# Patient Record
Sex: FEMALE | Race: WHITE | NOT HISPANIC OR LATINO | Employment: FULL TIME | URBAN - METROPOLITAN AREA
[De-identification: names, ages, dates, MRNs, and addresses within clinical notes are randomized per-mention and may not be internally consistent; named-entity substitution may affect disease eponyms.]

---

## 2017-01-09 ENCOUNTER — HISTORICAL (OUTPATIENT)
Dept: LAB | Facility: HOSPITAL | Age: 47
End: 2017-01-09

## 2017-02-06 ENCOUNTER — HISTORICAL (OUTPATIENT)
Dept: LAB | Facility: HOSPITAL | Age: 47
End: 2017-02-06

## 2017-03-01 ENCOUNTER — HISTORICAL (OUTPATIENT)
Dept: LAB | Facility: HOSPITAL | Age: 47
End: 2017-03-01

## 2017-03-03 ENCOUNTER — HISTORICAL (OUTPATIENT)
Dept: CARDIOLOGY | Facility: HOSPITAL | Age: 47
End: 2017-03-03

## 2017-04-05 ENCOUNTER — HISTORICAL (OUTPATIENT)
Dept: LAB | Facility: HOSPITAL | Age: 47
End: 2017-04-05

## 2017-06-05 ENCOUNTER — HISTORICAL (OUTPATIENT)
Dept: LAB | Facility: HOSPITAL | Age: 47
End: 2017-06-05

## 2017-06-05 LAB
INR PPP: 2.4
PROTHROMBIN TIME: 24.7 SECOND(S) (ref 9.8–12)

## 2017-06-30 ENCOUNTER — HISTORICAL (OUTPATIENT)
Dept: LAB | Facility: HOSPITAL | Age: 47
End: 2017-06-30

## 2017-06-30 LAB
INR PPP: 2.8
PROTHROMBIN TIME: 29.3 SECOND(S) (ref 9.8–12)

## 2017-07-20 ENCOUNTER — HISTORICAL (OUTPATIENT)
Dept: LAB | Facility: HOSPITAL | Age: 47
End: 2017-07-20

## 2017-07-20 ENCOUNTER — HISTORICAL (OUTPATIENT)
Dept: RADIOLOGY | Facility: HOSPITAL | Age: 47
End: 2017-07-20

## 2017-07-20 LAB
APPEARANCE, UA: ABNORMAL
BACTERIA SPEC CULT: ABNORMAL /HPF
BILIRUB UR QL STRIP: ABNORMAL
COLOR UR: YELLOW
GLUCOSE (UA): NEGATIVE
HEMOCCULT SP1 STL QL: NEGATIVE
HGB UR QL STRIP: NEGATIVE
KETONES UR QL STRIP: NEGATIVE
LEUKOCYTE ESTERASE UR QL STRIP: NEGATIVE
NITRITE UR QL STRIP: NEGATIVE
PH UR STRIP: 6.5 [PH] (ref 5–9)
PROT UR QL STRIP: NEGATIVE
RBC #/AREA URNS HPF: ABNORMAL /[HPF]
SP GR UR STRIP: 1.02 (ref 1–1.03)
SQUAMOUS EPITHELIAL, UA: 10 /HPF (ref 0–4)
UROBILINOGEN UR STRIP-ACNC: 1
WBC #/AREA URNS HPF: ABNORMAL /[HPF]

## 2017-08-02 ENCOUNTER — HISTORICAL (OUTPATIENT)
Dept: LAB | Facility: HOSPITAL | Age: 47
End: 2017-08-02

## 2017-08-02 LAB
INR PPP: 2.2
PROTHROMBIN TIME: 22.7 SECOND(S) (ref 9.8–12)

## 2017-08-30 ENCOUNTER — HISTORICAL (OUTPATIENT)
Dept: LAB | Facility: HOSPITAL | Age: 47
End: 2017-08-30

## 2017-08-30 LAB
INR PPP: 3
PROTHROMBIN TIME: 31.7 SECOND(S) (ref 9.8–12)

## 2017-09-29 ENCOUNTER — HISTORICAL (OUTPATIENT)
Dept: LAB | Facility: HOSPITAL | Age: 47
End: 2017-09-29

## 2017-09-29 LAB
INR PPP: 4.91 (ref 2–3)
PROTHROMBIN TIME: 53.5 SECOND(S) (ref 9.3–11.4)

## 2017-10-30 ENCOUNTER — HISTORICAL (OUTPATIENT)
Dept: LAB | Facility: HOSPITAL | Age: 47
End: 2017-10-30

## 2017-10-30 LAB
INR PPP: 5.64 (ref 2–3)
PROTHROMBIN TIME: 61.8 SECOND(S) (ref 9.3–11.4)

## 2017-11-02 ENCOUNTER — HISTORICAL (OUTPATIENT)
Dept: LAB | Facility: HOSPITAL | Age: 47
End: 2017-11-02

## 2017-11-02 LAB
INR PPP: 2.17 (ref 2–3)
PROTHROMBIN TIME: 22.8 SECOND(S) (ref 9.3–11.4)

## 2017-11-20 ENCOUNTER — HISTORICAL (OUTPATIENT)
Dept: LAB | Facility: HOSPITAL | Age: 47
End: 2017-11-20

## 2017-11-20 LAB
INR PPP: 1.7
PROTHROMBIN TIME: 17.1 SECOND(S) (ref 9.8–12)

## 2017-12-06 ENCOUNTER — HISTORICAL (OUTPATIENT)
Dept: LAB | Facility: HOSPITAL | Age: 47
End: 2017-12-06

## 2017-12-06 LAB
INR PPP: 4.4 (ref 2–3)
PROTHROMBIN TIME: 47.7 SECOND(S) (ref 9.3–11.4)

## 2017-12-16 LAB — RAPID GROUP A STREP (OHS): NEGATIVE

## 2017-12-26 LAB — RAPID GROUP A STREP (OHS): NEGATIVE

## 2018-01-02 ENCOUNTER — HISTORICAL (OUTPATIENT)
Dept: LAB | Facility: HOSPITAL | Age: 48
End: 2018-01-02

## 2018-01-02 LAB
ALBUMIN SERPL-MCNC: 3.5 GM/DL (ref 3.4–5)
ALBUMIN/GLOB SERPL: 1 {RATIO}
ALP SERPL-CCNC: 134 UNIT/L (ref 45–117)
ALT SERPL-CCNC: 19 UNIT/L (ref 13–56)
AST SERPL-CCNC: 25 UNIT/L (ref 15–37)
BILIRUB SERPL-MCNC: 0.3 MG/DL (ref 0.2–1)
BILIRUBIN DIRECT+TOT PNL SERPL-MCNC: 0.1 MG/DL (ref 0–0.2)
BILIRUBIN DIRECT+TOT PNL SERPL-MCNC: 0.2 MG/DL (ref 0–1)
BUN SERPL-MCNC: 9 MG/DL (ref 7–18)
CALCIUM SERPL-MCNC: 8.7 MG/DL (ref 8.5–10.1)
CHLORIDE SERPL-SCNC: 108 MMOL/L (ref 98–107)
CO2 SERPL-SCNC: 25 MMOL/L (ref 21–32)
CREAT SERPL-MCNC: 0.7 MG/DL (ref 0.55–1.02)
GLOBULIN SER-MCNC: 4 GM/DL (ref 2–4)
GLUCOSE SERPL-MCNC: 65 MG/DL (ref 74–106)
INR PPP: 4.4 (ref 2–3)
POTASSIUM SERPL-SCNC: 3.5 MMOL/L (ref 3.5–5.1)
PROT SERPL-MCNC: 7.5 GM/DL (ref 6.4–8.2)
PROTHROMBIN TIME: 44.9 SECOND(S) (ref 9.3–11.4)
SODIUM SERPL-SCNC: 142 MMOL/L (ref 136–145)

## 2018-01-15 ENCOUNTER — HISTORICAL (OUTPATIENT)
Dept: LAB | Facility: HOSPITAL | Age: 48
End: 2018-01-15

## 2018-01-15 LAB
INR PPP: 1.8 (ref 2–3)
PROTHROMBIN TIME: 18.3 SECOND(S) (ref 9.3–11.4)

## 2018-01-28 LAB
INFLUENZA A ANTIGEN, POC: POSITIVE
INFLUENZA B ANTIGEN, POC: NEGATIVE

## 2018-01-30 ENCOUNTER — HISTORICAL (OUTPATIENT)
Dept: LAB | Facility: HOSPITAL | Age: 48
End: 2018-01-30

## 2018-01-30 LAB
INR PPP: 5.6 (ref 2–3)
PROTHROMBIN TIME: 56.9 SECOND(S) (ref 9.3–11.4)

## 2018-02-06 ENCOUNTER — HISTORICAL (OUTPATIENT)
Dept: LAB | Facility: HOSPITAL | Age: 48
End: 2018-02-06

## 2018-02-06 LAB
INR PPP: 1.33 (ref 2–3)
PROTHROMBIN TIME: 13.8 SECOND(S) (ref 9.3–11.4)

## 2018-02-19 ENCOUNTER — HISTORICAL (OUTPATIENT)
Dept: LAB | Facility: HOSPITAL | Age: 48
End: 2018-02-19

## 2018-02-19 LAB
INR PPP: 2.42 (ref 2–3)
PROTHROMBIN TIME: 25.6 SECOND(S) (ref 9.3–11.4)

## 2018-03-06 ENCOUNTER — HISTORICAL (OUTPATIENT)
Dept: LAB | Facility: HOSPITAL | Age: 48
End: 2018-03-06

## 2018-03-06 LAB
INR PPP: 6.73 (ref 2–3)
PROTHROMBIN TIME: 74.4 SECOND(S) (ref 9.3–11.4)

## 2018-03-14 ENCOUNTER — HISTORICAL (OUTPATIENT)
Dept: LAB | Facility: HOSPITAL | Age: 48
End: 2018-03-14

## 2018-03-14 LAB
INR PPP: 1.4 (ref 2–3)
PROTHROMBIN TIME: 13.3 SECOND(S) (ref 9.3–11.4)

## 2018-03-30 ENCOUNTER — HISTORICAL (OUTPATIENT)
Dept: LAB | Facility: HOSPITAL | Age: 48
End: 2018-03-30

## 2018-03-30 LAB
ALBUMIN SERPL-MCNC: 3.3 GM/DL (ref 3.4–5)
ALBUMIN/GLOB SERPL: 1 {RATIO}
ALP SERPL-CCNC: 106 UNIT/L (ref 45–117)
ALT SERPL-CCNC: 13 UNIT/L (ref 13–56)
AST SERPL-CCNC: 17 UNIT/L (ref 15–37)
BILIRUB SERPL-MCNC: 0.3 MG/DL (ref 0.2–1)
BILIRUBIN DIRECT+TOT PNL SERPL-MCNC: <0.1 MG/DL (ref 0–0.2)
BILIRUBIN DIRECT+TOT PNL SERPL-MCNC: >0.2 MG/DL (ref 0–1)
BUN SERPL-MCNC: 10 MG/DL (ref 7–18)
CALCIUM SERPL-MCNC: 8.9 MG/DL (ref 8.5–10.1)
CHLORIDE SERPL-SCNC: 112 MMOL/L (ref 98–107)
CO2 SERPL-SCNC: 22 MMOL/L (ref 21–32)
CREAT SERPL-MCNC: 0.7 MG/DL (ref 0.55–1.02)
GLOBULIN SER-MCNC: 4 GM/DL (ref 2–4)
GLUCOSE SERPL-MCNC: 112 MG/DL (ref 74–106)
INR PPP: 8.5 (ref 2–3)
POTASSIUM SERPL-SCNC: 3.7 MMOL/L (ref 3.5–5.1)
PROT SERPL-MCNC: 6.8 GM/DL (ref 6.4–8.2)
PROTHROMBIN TIME: 86.2 SECOND(S) (ref 9.3–11.4)
SODIUM SERPL-SCNC: 143 MMOL/L (ref 136–145)

## 2018-04-04 ENCOUNTER — HISTORICAL (OUTPATIENT)
Dept: LAB | Facility: HOSPITAL | Age: 48
End: 2018-04-04

## 2018-04-04 LAB
ABS NEUT (OLG): 3.41 X10(3)/MCL (ref 2.1–9.2)
ALBUMIN SERPL-MCNC: 3.4 GM/DL (ref 3.4–5)
ALBUMIN/GLOB SERPL: 1 {RATIO}
ALP SERPL-CCNC: 102 UNIT/L (ref 45–117)
ALT SERPL-CCNC: 14 UNIT/L (ref 13–56)
AST SERPL-CCNC: 17 UNIT/L (ref 15–37)
BASOPHILS # BLD AUTO: 0.02 X10(3)/MCL (ref 0–0.2)
BASOPHILS NFR BLD AUTO: 0.3 % (ref 0–1)
BILIRUB SERPL-MCNC: 0.3 MG/DL (ref 0.2–1)
BILIRUBIN DIRECT+TOT PNL SERPL-MCNC: <0.1 MG/DL (ref 0–0.2)
BILIRUBIN DIRECT+TOT PNL SERPL-MCNC: >0.2 MG/DL (ref 0–1)
BUN SERPL-MCNC: 13 MG/DL (ref 7–18)
CALCIUM SERPL-MCNC: 8.8 MG/DL (ref 8.5–10.1)
CHLORIDE SERPL-SCNC: 111 MMOL/L (ref 98–107)
CO2 SERPL-SCNC: 27 MMOL/L (ref 21–32)
CREAT SERPL-MCNC: 0.58 MG/DL (ref 0.55–1.02)
EOSINOPHIL # BLD AUTO: 0.06 X10(3)/MCL (ref 0–0.9)
EOSINOPHIL NFR BLD AUTO: 0.9 % (ref 0–6.4)
ERYTHROCYTE [DISTWIDTH] IN BLOOD BY AUTOMATED COUNT: 14.5 % (ref 11.5–17)
GLOBULIN SER-MCNC: 4 GM/DL (ref 2–4)
GLUCOSE SERPL-MCNC: 79 MG/DL (ref 74–106)
HCT VFR BLD AUTO: 35.3 % (ref 37–47)
HGB BLD-MCNC: 11.1 GM/DL (ref 12–16)
IMM GRANULOCYTES # BLD AUTO: 0.01 10*3/UL (ref 0–0.02)
IMM GRANULOCYTES NFR BLD AUTO: 0.2 % (ref 0–0.43)
INR PPP: 1.8 (ref 2–3)
LYMPHOCYTES # BLD AUTO: 2.68 X10(3)/MCL (ref 0.6–4.6)
LYMPHOCYTES NFR BLD AUTO: 40.6 % (ref 16–44)
MCH RBC QN AUTO: 27.5 PG (ref 27–31)
MCHC RBC AUTO-ENTMCNC: 31.4 GM/DL (ref 33–36)
MCV RBC AUTO: 87.6 FL (ref 80–94)
MONOCYTES # BLD AUTO: 0.42 X10(3)/MCL (ref 0.1–1.3)
MONOCYTES NFR BLD AUTO: 6.4 % (ref 4–12.1)
NEUTROPHILS # BLD AUTO: 3.41 X10(3)/MCL (ref 2.1–9.2)
NEUTROPHILS NFR BLD AUTO: 51.6 % (ref 43–73)
NRBC BLD AUTO-RTO: 0 % (ref 0–0.2)
PLATELET # BLD AUTO: 246 X10(3)/MCL (ref 130–400)
PMV BLD AUTO: 9.7 FL (ref 7.4–10.4)
POTASSIUM SERPL-SCNC: 4.2 MMOL/L (ref 3.5–5.1)
PROT SERPL-MCNC: 7.2 GM/DL (ref 6.4–8.2)
PROTHROMBIN TIME: 17.6 SECOND(S) (ref 9.3–11.4)
RBC # BLD AUTO: 4.03 X10(6)/MCL (ref 4.2–5.4)
SODIUM SERPL-SCNC: 142 MMOL/L (ref 136–145)
WBC # SPEC AUTO: 6.6 X10(3)/MCL (ref 4.5–11.5)

## 2018-04-09 ENCOUNTER — HISTORICAL (OUTPATIENT)
Dept: LAB | Facility: HOSPITAL | Age: 48
End: 2018-04-09

## 2018-04-09 LAB
INR PPP: 1.47 (ref 2–3)
PROTHROMBIN TIME: 14.9 SECOND(S) (ref 9.3–11.4)

## 2018-04-23 ENCOUNTER — HISTORICAL (OUTPATIENT)
Dept: LAB | Facility: HOSPITAL | Age: 48
End: 2018-04-23

## 2018-04-27 ENCOUNTER — HISTORICAL (OUTPATIENT)
Dept: ADMINISTRATIVE | Facility: HOSPITAL | Age: 48
End: 2018-04-27

## 2018-04-27 LAB
APTT PPP: 31.8 SECOND(S) (ref 24.8–36.9)
INR PPP: 1.37 (ref 0–1.27)
PROTHROMBIN TIME: 17.3 SECOND(S) (ref 12.2–14.7)

## 2018-05-03 ENCOUNTER — HISTORICAL (OUTPATIENT)
Dept: RADIOLOGY | Facility: HOSPITAL | Age: 48
End: 2018-05-03

## 2018-05-03 LAB
ABS NEUT (OLG): 3.17 X10(3)/MCL (ref 2.1–9.2)
ALBUMIN SERPL-MCNC: 3.1 GM/DL (ref 3.4–5)
ALBUMIN/GLOB SERPL: 1 {RATIO}
ALP SERPL-CCNC: 117 UNIT/L (ref 45–117)
ALT SERPL-CCNC: 15 UNIT/L (ref 13–56)
AST SERPL-CCNC: 17 UNIT/L (ref 15–37)
BASOPHILS # BLD AUTO: 0.04 X10(3)/MCL (ref 0–0.2)
BASOPHILS NFR BLD AUTO: 0.6 % (ref 0–1)
BILIRUB SERPL-MCNC: 0.2 MG/DL (ref 0.2–1)
BILIRUBIN DIRECT+TOT PNL SERPL-MCNC: 0.1 MG/DL (ref 0–0.2)
BILIRUBIN DIRECT+TOT PNL SERPL-MCNC: 0.1 MG/DL (ref 0–1)
BUN SERPL-MCNC: 12 MG/DL (ref 7–18)
CALCIUM SERPL-MCNC: 8.9 MG/DL (ref 8.5–10.1)
CHLORIDE SERPL-SCNC: 109 MMOL/L (ref 98–107)
CO2 SERPL-SCNC: 27 MMOL/L (ref 21–32)
CREAT SERPL-MCNC: 0.61 MG/DL (ref 0.55–1.02)
EOSINOPHIL # BLD AUTO: 0.51 X10(3)/MCL (ref 0–0.9)
EOSINOPHIL NFR BLD AUTO: 7.5 % (ref 0–6.4)
ERYTHROCYTE [DISTWIDTH] IN BLOOD BY AUTOMATED COUNT: 14.5 % (ref 11.5–17)
GLOBULIN SER-MCNC: 4 GM/DL (ref 2–4)
GLUCOSE SERPL-MCNC: 95 MG/DL (ref 74–106)
HCT VFR BLD AUTO: 33.9 % (ref 37–47)
HGB BLD-MCNC: 10.8 GM/DL (ref 12–16)
IMM GRANULOCYTES # BLD AUTO: 0.03 10*3/UL (ref 0–0.02)
IMM GRANULOCYTES NFR BLD AUTO: 0.4 % (ref 0–0.43)
LYMPHOCYTES # BLD AUTO: 2.57 X10(3)/MCL (ref 0.6–4.6)
LYMPHOCYTES NFR BLD AUTO: 38 % (ref 16–44)
MCH RBC QN AUTO: 28.1 PG (ref 27–31)
MCHC RBC AUTO-ENTMCNC: 31.9 GM/DL (ref 33–36)
MCV RBC AUTO: 88.1 FL (ref 80–94)
MONOCYTES # BLD AUTO: 0.45 X10(3)/MCL (ref 0.1–1.3)
MONOCYTES NFR BLD AUTO: 6.6 % (ref 4–12.1)
NEUTROPHILS # BLD AUTO: 3.17 X10(3)/MCL (ref 2.1–9.2)
NEUTROPHILS NFR BLD AUTO: 46.9 % (ref 43–73)
NRBC BLD AUTO-RTO: 0 % (ref 0–0.2)
PLATELET # BLD AUTO: 250 X10(3)/MCL (ref 130–400)
PMV BLD AUTO: 9 FL (ref 7.4–10.4)
POTASSIUM SERPL-SCNC: 4 MMOL/L (ref 3.5–5.1)
PROT SERPL-MCNC: 6.7 GM/DL (ref 6.4–8.2)
RBC # BLD AUTO: 3.85 X10(6)/MCL (ref 4.2–5.4)
SODIUM SERPL-SCNC: 144 MMOL/L (ref 136–145)
WBC # SPEC AUTO: 6.8 X10(3)/MCL (ref 4.5–11.5)

## 2018-05-29 ENCOUNTER — HISTORICAL (OUTPATIENT)
Dept: LAB | Facility: HOSPITAL | Age: 48
End: 2018-05-29

## 2018-05-29 LAB
INR PPP: 3.8 (ref 2–3)
PROTHROMBIN TIME: 36.3 SECOND(S) (ref 9.3–11.4)

## 2018-05-30 ENCOUNTER — HISTORICAL (OUTPATIENT)
Dept: RADIOLOGY | Facility: HOSPITAL | Age: 48
End: 2018-05-30

## 2018-06-29 LAB
BMD RECOMMENDATION EXT: NORMAL
BMD RECOMMENDATION EXT: NORMAL

## 2018-07-31 ENCOUNTER — HOSPITAL ENCOUNTER (OUTPATIENT)
Dept: MEDSURG UNIT | Facility: HOSPITAL | Age: 48
End: 2018-08-02
Attending: INTERNAL MEDICINE | Admitting: INTERNAL MEDICINE

## 2018-07-31 LAB
ABS NEUT (OLG): 2.81 X10(3)/MCL (ref 2.1–9.2)
ALBUMIN SERPL-MCNC: 3.7 GM/DL (ref 3.4–5)
ALBUMIN/GLOB SERPL: 1.3 RATIO (ref 1.1–2)
ALP SERPL-CCNC: 102 UNIT/L (ref 38–126)
ALT SERPL-CCNC: 29 UNIT/L (ref 12–78)
AST SERPL-CCNC: 38 UNIT/L (ref 15–37)
BASOPHILS # BLD AUTO: 0 X10(3)/MCL (ref 0–0.2)
BASOPHILS NFR BLD AUTO: 0 %
BILIRUB SERPL-MCNC: 0.4 MG/DL (ref 0.2–1)
BILIRUBIN DIRECT+TOT PNL SERPL-MCNC: 0.2 MG/DL (ref 0–0.5)
BILIRUBIN DIRECT+TOT PNL SERPL-MCNC: 0.2 MG/DL (ref 0–0.8)
BUN SERPL-MCNC: 11 MG/DL (ref 7–18)
CALCIUM SERPL-MCNC: 9 MG/DL (ref 8.5–10.1)
CHLORIDE SERPL-SCNC: 110 MMOL/L (ref 98–107)
CHOLEST SERPL-MCNC: 133 MG/DL (ref 0–200)
CHOLEST/HDLC SERPL: 1.7 {RATIO} (ref 0–4)
CO2 SERPL-SCNC: 27 MMOL/L (ref 21–32)
CREAT SERPL-MCNC: 0.63 MG/DL (ref 0.55–1.02)
EOSINOPHIL # BLD AUTO: 0.1 X10(3)/MCL (ref 0–0.9)
EOSINOPHIL NFR BLD AUTO: 1 %
ERYTHROCYTE [DISTWIDTH] IN BLOOD BY AUTOMATED COUNT: 14.7 % (ref 11.5–17)
EST. AVERAGE GLUCOSE BLD GHB EST-MCNC: 108 MG/DL
GLOBULIN SER-MCNC: 2.9 GM/DL (ref 2.4–3.5)
GLUCOSE SERPL-MCNC: 83 MG/DL (ref 74–106)
HBA1C MFR BLD: 5.4 % (ref 4.2–6.3)
HCT VFR BLD AUTO: 35.9 % (ref 37–47)
HDLC SERPL-MCNC: 77 MG/DL (ref 35–60)
HGB BLD-MCNC: 11.2 GM/DL (ref 12–16)
INR PPP: 1.43 (ref 0–1.27)
LDLC SERPL CALC-MCNC: 31 MG/DL (ref 0–129)
LYMPHOCYTES # BLD AUTO: 2.9 X10(3)/MCL (ref 0.6–4.6)
LYMPHOCYTES NFR BLD AUTO: 47 %
MAGNESIUM SERPL-MCNC: 2 MG/DL (ref 1.8–2.4)
MCH RBC QN AUTO: 27.4 PG (ref 27–31)
MCHC RBC AUTO-ENTMCNC: 31.2 GM/DL (ref 33–36)
MCV RBC AUTO: 87.8 FL (ref 80–94)
MONOCYTES # BLD AUTO: 0.4 X10(3)/MCL (ref 0.1–1.3)
MONOCYTES NFR BLD AUTO: 6 %
NEUTROPHILS # BLD AUTO: 2.81 X10(3)/MCL (ref 1.4–7.9)
NEUTROPHILS NFR BLD AUTO: 45 %
PLATELET # BLD AUTO: 230 X10(3)/MCL (ref 130–400)
PMV BLD AUTO: 10.2 FL (ref 9.4–12.4)
POTASSIUM SERPL-SCNC: 4.1 MMOL/L (ref 3.5–5.1)
PROT SERPL-MCNC: 6.6 GM/DL (ref 6.4–8.2)
PROTHROMBIN TIME: 17.9 SECOND(S) (ref 12.2–14.7)
RBC # BLD AUTO: 4.09 X10(6)/MCL (ref 4.2–5.4)
SODIUM SERPL-SCNC: 141 MMOL/L (ref 136–145)
TRIGL SERPL-MCNC: 123 MG/DL (ref 30–150)
TSH SERPL-ACNC: 1.4 MIU/L (ref 0.36–3.74)
VLDLC SERPL CALC-MCNC: 25 MG/DL
WBC # SPEC AUTO: 6.2 X10(3)/MCL (ref 4.5–11.5)

## 2018-08-01 LAB
ABS NEUT (OLG): 2.77 X10(3)/MCL (ref 2.1–9.2)
BASOPHILS # BLD AUTO: 0 X10(3)/MCL (ref 0–0.2)
BASOPHILS NFR BLD AUTO: 0 %
BUN SERPL-MCNC: 9 MG/DL (ref 7–18)
CALCIUM SERPL-MCNC: 8.6 MG/DL (ref 8.5–10.1)
CHLORIDE SERPL-SCNC: 110 MMOL/L (ref 98–107)
CO2 SERPL-SCNC: 27 MMOL/L (ref 21–32)
CREAT SERPL-MCNC: 0.69 MG/DL (ref 0.55–1.02)
CREAT/UREA NIT SERPL: 13
CRP SERPL HS-MCNC: 1.26 MG/L (ref 0–3)
CRP SERPL HS-MCNC: 1.31 MG/L (ref 0–3)
EOSINOPHIL # BLD AUTO: 0.1 X10(3)/MCL (ref 0–0.9)
EOSINOPHIL NFR BLD AUTO: 2 %
ERYTHROCYTE [DISTWIDTH] IN BLOOD BY AUTOMATED COUNT: 14.8 % (ref 11.5–17)
ERYTHROCYTE [SEDIMENTATION RATE] IN BLOOD: 4 MM/HR (ref 0–20)
ERYTHROCYTE [SEDIMENTATION RATE] IN BLOOD: 60 MM/HR (ref 0–20)
GLUCOSE SERPL-MCNC: 84 MG/DL (ref 74–106)
HCT VFR BLD AUTO: 35.6 % (ref 37–47)
HGB BLD-MCNC: 10.9 GM/DL (ref 12–16)
LYMPHOCYTES # BLD AUTO: 2.8 X10(3)/MCL (ref 0.6–4.6)
LYMPHOCYTES NFR BLD AUTO: 46 %
MCH RBC QN AUTO: 27.5 PG (ref 27–31)
MCHC RBC AUTO-ENTMCNC: 30.6 GM/DL (ref 33–36)
MCV RBC AUTO: 89.9 FL (ref 80–94)
MONOCYTES # BLD AUTO: 0.4 X10(3)/MCL (ref 0.1–1.3)
MONOCYTES NFR BLD AUTO: 7 %
NEUTROPHILS # BLD AUTO: 2.77 X10(3)/MCL (ref 1.4–7.9)
NEUTROPHILS NFR BLD AUTO: 45 %
PLATELET # BLD AUTO: 203 X10(3)/MCL (ref 130–400)
PMV BLD AUTO: 10 FL (ref 9.4–12.4)
POTASSIUM SERPL-SCNC: 4.5 MMOL/L (ref 3.5–5.1)
RBC # BLD AUTO: 3.96 X10(6)/MCL (ref 4.2–5.4)
SODIUM SERPL-SCNC: 142 MMOL/L (ref 136–145)
WBC # SPEC AUTO: 6.1 X10(3)/MCL (ref 4.5–11.5)

## 2018-08-07 LAB
FINAL CULTURE: NORMAL
FINAL CULTURE: NORMAL

## 2018-08-31 ENCOUNTER — HISTORICAL (OUTPATIENT)
Dept: CARDIOLOGY | Facility: HOSPITAL | Age: 48
End: 2018-08-31

## 2018-08-31 LAB
ABS NEUT (OLG): 2.53 X10(3)/MCL (ref 2.1–9.2)
ALBUMIN SERPL-MCNC: 3.4 GM/DL (ref 3.4–5)
ALBUMIN/GLOB SERPL: 1.1 RATIO (ref 1.1–2)
ALP SERPL-CCNC: 91 UNIT/L (ref 38–126)
ALT SERPL-CCNC: 18 UNIT/L (ref 12–78)
AST SERPL-CCNC: 23 UNIT/L (ref 15–37)
BASOPHILS # BLD AUTO: 0 X10(3)/MCL (ref 0–0.2)
BASOPHILS NFR BLD AUTO: 0 %
BILIRUB SERPL-MCNC: 0.3 MG/DL (ref 0.2–1)
BILIRUBIN DIRECT+TOT PNL SERPL-MCNC: 0.1 MG/DL (ref 0–0.5)
BILIRUBIN DIRECT+TOT PNL SERPL-MCNC: 0.2 MG/DL (ref 0–0.8)
BUN SERPL-MCNC: 8 MG/DL (ref 7–18)
CALCIUM SERPL-MCNC: 9.5 MG/DL (ref 8.5–10.1)
CHLORIDE SERPL-SCNC: 112 MMOL/L (ref 98–107)
CHOLEST SERPL-MCNC: 112 MG/DL (ref 0–200)
CHOLEST/HDLC SERPL: 1.6 {RATIO} (ref 0–4)
CO2 SERPL-SCNC: 29 MMOL/L (ref 21–32)
CREAT SERPL-MCNC: 0.66 MG/DL (ref 0.55–1.02)
EOSINOPHIL # BLD AUTO: 0.1 X10(3)/MCL (ref 0–0.9)
EOSINOPHIL NFR BLD AUTO: 2 %
ERYTHROCYTE [DISTWIDTH] IN BLOOD BY AUTOMATED COUNT: 14.2 % (ref 11.5–17)
EST. AVERAGE GLUCOSE BLD GHB EST-MCNC: 94 MG/DL
GLOBULIN SER-MCNC: 3.2 GM/DL (ref 2.4–3.5)
GLUCOSE SERPL-MCNC: 80 MG/DL (ref 74–106)
HBA1C MFR BLD: 4.9 % (ref 4.2–6.3)
HCT VFR BLD AUTO: 35.9 % (ref 37–47)
HDLC SERPL-MCNC: 70 MG/DL (ref 35–60)
HGB BLD-MCNC: 10.7 GM/DL (ref 12–16)
INR PPP: 1.3 (ref 0–1.27)
LDLC SERPL CALC-MCNC: 28 MG/DL (ref 0–129)
LYMPHOCYTES # BLD AUTO: 2.2 X10(3)/MCL (ref 0.6–4.6)
LYMPHOCYTES NFR BLD AUTO: 42 %
MAGNESIUM SERPL-MCNC: 1.9 MG/DL (ref 1.8–2.4)
MCH RBC QN AUTO: 27 PG (ref 27–31)
MCHC RBC AUTO-ENTMCNC: 29.8 GM/DL (ref 33–36)
MCV RBC AUTO: 90.7 FL (ref 80–94)
MONOCYTES # BLD AUTO: 0.4 X10(3)/MCL (ref 0.1–1.3)
MONOCYTES NFR BLD AUTO: 8 %
NEUTROPHILS # BLD AUTO: 2.53 X10(3)/MCL (ref 1.4–7.9)
NEUTROPHILS NFR BLD AUTO: 48 %
PLATELET # BLD AUTO: 184 X10(3)/MCL (ref 130–400)
PMV BLD AUTO: 10.5 FL (ref 9.4–12.4)
POTASSIUM SERPL-SCNC: 4 MMOL/L (ref 3.5–5.1)
PROT SERPL-MCNC: 6.6 GM/DL (ref 6.4–8.2)
PROTHROMBIN TIME: 16.6 SECOND(S) (ref 12.2–14.7)
RBC # BLD AUTO: 3.96 X10(6)/MCL (ref 4.2–5.4)
SODIUM SERPL-SCNC: 144 MMOL/L (ref 136–145)
TRIGL SERPL-MCNC: 69 MG/DL (ref 30–150)
TSH SERPL-ACNC: 1.11 MIU/L (ref 0.36–3.74)
VLDLC SERPL CALC-MCNC: 14 MG/DL
WBC # SPEC AUTO: 5.2 X10(3)/MCL (ref 4.5–11.5)

## 2018-09-25 ENCOUNTER — HISTORICAL (OUTPATIENT)
Dept: LAB | Facility: HOSPITAL | Age: 48
End: 2018-09-25

## 2018-09-25 LAB
INR PPP: 2.79 (ref 0–1.27)
PROTHROMBIN TIME: 30.3 SECOND(S) (ref 12.2–14.7)

## 2018-09-26 ENCOUNTER — HISTORICAL (OUTPATIENT)
Dept: LAB | Facility: HOSPITAL | Age: 48
End: 2018-09-26

## 2018-09-26 LAB
APPEARANCE, UA: ABNORMAL
BACTERIA SPEC CULT: ABNORMAL /HPF
BILIRUB UR QL STRIP: NEGATIVE
COLOR UR: ABNORMAL
GLUCOSE (UA): NEGATIVE
HGB UR QL STRIP: ABNORMAL
KETONES UR QL STRIP: NEGATIVE
LEUKOCYTE ESTERASE UR QL STRIP: NEGATIVE
NITRITE UR QL STRIP: NEGATIVE
PH UR STRIP: 6 [PH] (ref 5–7)
PROT UR QL STRIP: NEGATIVE
RBC #/AREA URNS HPF: ABNORMAL /HPF
SP GR UR STRIP: 1.01 (ref 1–1.03)
SQUAMOUS EPITHELIAL, UA: ABNORMAL /LPF
UROBILINOGEN UR STRIP-ACNC: NEGATIVE
WBC #/AREA URNS HPF: ABNORMAL /HPF

## 2018-10-19 ENCOUNTER — HISTORICAL (OUTPATIENT)
Dept: LAB | Facility: HOSPITAL | Age: 48
End: 2018-10-19

## 2018-10-19 LAB
INR PPP: 1.29 (ref 2–3)
PROTHROMBIN TIME: 13.2 SECOND(S) (ref 9.3–11.4)

## 2018-11-15 ENCOUNTER — HISTORICAL (OUTPATIENT)
Dept: LAB | Facility: HOSPITAL | Age: 48
End: 2018-11-15

## 2018-11-15 LAB
INR PPP: 3.78 (ref 2–3)
PROTHROMBIN TIME: 35.6 SECOND(S) (ref 9.3–11.4)

## 2018-12-19 ENCOUNTER — HISTORICAL (OUTPATIENT)
Dept: LAB | Facility: HOSPITAL | Age: 48
End: 2018-12-19

## 2018-12-19 LAB
INR PPP: 2.73 (ref 2–3)
PROTHROMBIN TIME: 26.4 SECOND(S) (ref 9.3–11.4)

## 2018-12-23 LAB
INFLUENZA A ANTIGEN, POC: NEGATIVE
INFLUENZA B ANTIGEN, POC: NEGATIVE

## 2019-01-29 ENCOUNTER — HISTORICAL (OUTPATIENT)
Dept: LAB | Facility: HOSPITAL | Age: 49
End: 2019-01-29

## 2019-01-29 LAB
INR PPP: 2 (ref 0–1.3)
PROTHROMBIN TIME: 22.9 SECOND(S) (ref 12.2–14.7)

## 2019-03-13 ENCOUNTER — HISTORICAL (OUTPATIENT)
Dept: LAB | Facility: HOSPITAL | Age: 49
End: 2019-03-13

## 2019-03-13 LAB
INR PPP: 1.16 (ref 2–3)
PROTHROMBIN TIME: 12.1 SECOND(S) (ref 9.3–11.4)

## 2019-03-22 LAB — RAPID GROUP A STREP (OHS): NEGATIVE

## 2019-03-27 ENCOUNTER — HISTORICAL (OUTPATIENT)
Dept: LAB | Facility: HOSPITAL | Age: 49
End: 2019-03-27

## 2019-03-27 LAB
INR PPP: 1.12 (ref 2–3)
PROTHROMBIN TIME: 11.7 SECOND(S) (ref 9.3–11.4)

## 2019-05-13 ENCOUNTER — HISTORICAL (OUTPATIENT)
Dept: LAB | Facility: HOSPITAL | Age: 49
End: 2019-05-13

## 2019-05-13 LAB
INR PPP: 2.4 (ref 2–3)
PROTHROMBIN TIME: 26.1 SEC (ref 11.7–14.5)

## 2019-05-30 LAB — CRC RECOMMENDATION EXT: NORMAL

## 2019-07-09 ENCOUNTER — HISTORICAL (OUTPATIENT)
Dept: ADMINISTRATIVE | Facility: HOSPITAL | Age: 49
End: 2019-07-09

## 2019-07-18 ENCOUNTER — HISTORICAL (OUTPATIENT)
Dept: LAB | Facility: HOSPITAL | Age: 49
End: 2019-07-18

## 2019-07-18 LAB
ABS NEUT (OLG): 2.76 X10(3)/MCL (ref 2.1–9.2)
ALBUMIN SERPL-MCNC: 3.8 GM/DL (ref 3.4–5)
ALBUMIN/GLOB SERPL: 1.2 RATIO (ref 1.1–2)
ALP SERPL-CCNC: 103 UNIT/L (ref 46–116)
ALT SERPL-CCNC: 32 UNIT/L (ref 12–78)
ANISOCYTOSIS BLD QL SMEAR: SLIGHT
AST SERPL-CCNC: 36 UNIT/L (ref 15–37)
BASOPHILS # BLD AUTO: 0 X10(3)/MCL (ref 0–0.2)
BASOPHILS NFR BLD AUTO: 0 %
BILIRUB SERPL-MCNC: 0.4 MG/DL (ref 0.2–1)
BILIRUBIN DIRECT+TOT PNL SERPL-MCNC: 0.12 MG/DL (ref 0–0.2)
BILIRUBIN DIRECT+TOT PNL SERPL-MCNC: 0.28 MG/DL (ref 0–0.8)
BUN SERPL-MCNC: 13.5 MG/DL (ref 7–18)
CALCIUM SERPL-MCNC: 8.9 MG/DL (ref 8.5–10.1)
CHLORIDE SERPL-SCNC: 107 MMOL/L (ref 98–107)
CO2 SERPL-SCNC: 27.2 MMOL/L (ref 21–32)
CREAT SERPL-MCNC: 0.81 MG/DL (ref 0.6–1.3)
EOSINOPHIL # BLD AUTO: 0.1 X10(3)/MCL (ref 0–0.9)
EOSINOPHIL NFR BLD AUTO: 2 %
ERYTHROCYTE [DISTWIDTH] IN BLOOD BY AUTOMATED COUNT: 16.1 % (ref 11.5–17)
GLOBULIN SER-MCNC: 3.3 GM/DL (ref 2.4–3.5)
GLUCOSE SERPL-MCNC: 87 MG/DL (ref 74–106)
HCT VFR BLD AUTO: 34.1 % (ref 37–47)
HGB BLD-MCNC: 10.2 GM/DL (ref 12–16)
HYPOCHROMIA BLD QL SMEAR: SLIGHT
LYMPHOCYTES # BLD AUTO: 1.7 X10(3)/MCL (ref 0.6–4.6)
LYMPHOCYTES NFR BLD AUTO: 34 %
MCH RBC QN AUTO: 24.2 PG (ref 27–31)
MCHC RBC AUTO-ENTMCNC: 29.9 GM/DL (ref 33–36)
MCV RBC AUTO: 80.8 FL (ref 80–94)
MONOCYTES # BLD AUTO: 0.3 X10(3)/MCL (ref 0.1–1.3)
MONOCYTES NFR BLD AUTO: 7 %
NEUTROPHILS # BLD AUTO: 2.76 X10(3)/MCL (ref 1.4–7.9)
NEUTROPHILS NFR BLD AUTO: 56 %
PLATELET # BLD AUTO: 267 X10(3)/MCL (ref 130–400)
PLATELET # BLD EST: NORMAL 10*3/UL
PMV BLD AUTO: 9.8 FL (ref 9.4–12.4)
POTASSIUM SERPL-SCNC: 4.3 MMOL/L (ref 3.5–5.1)
PROT SERPL-MCNC: 7.1 GM/DL (ref 6.4–8.2)
RBC # BLD AUTO: 4.22 X10(6)/MCL (ref 4.2–5.4)
SODIUM SERPL-SCNC: 143 MMOL/L (ref 136–145)
WBC # SPEC AUTO: 4.9 X10(3)/MCL (ref 4.5–11.5)

## 2019-07-26 ENCOUNTER — HISTORICAL (OUTPATIENT)
Dept: LAB | Facility: HOSPITAL | Age: 49
End: 2019-07-26

## 2019-07-26 LAB
APPEARANCE, UA: CLEAR
BACTERIA SPEC CULT: ABNORMAL /HPF
BILIRUB UR QL STRIP: NEGATIVE
COLOR UR: YELLOW
GLUCOSE (UA): NEGATIVE
HGB UR QL STRIP: ABNORMAL
KETONES UR QL STRIP: NEGATIVE
LEUKOCYTE ESTERASE UR QL STRIP: NEGATIVE
NITRITE UR QL STRIP: NEGATIVE
PH UR STRIP: 6.5 [PH] (ref 5–9)
PROT UR QL STRIP: NEGATIVE
RBC #/AREA URNS HPF: ABNORMAL /HPF
SP GR UR STRIP: 1.02 (ref 1–1.03)
SQUAMOUS EPITHELIAL, UA: ABNORMAL
UROBILINOGEN UR STRIP-ACNC: 1
WBC #/AREA URNS HPF: ABNORMAL /[HPF]

## 2019-08-05 ENCOUNTER — HISTORICAL (OUTPATIENT)
Dept: ADMINISTRATIVE | Facility: HOSPITAL | Age: 49
End: 2019-08-05

## 2019-08-27 ENCOUNTER — HISTORICAL (OUTPATIENT)
Dept: ADMINISTRATIVE | Facility: HOSPITAL | Age: 49
End: 2019-08-27

## 2019-08-30 ENCOUNTER — HISTORICAL (OUTPATIENT)
Dept: ADMINISTRATIVE | Facility: HOSPITAL | Age: 49
End: 2019-08-30

## 2019-09-06 ENCOUNTER — HISTORICAL (OUTPATIENT)
Dept: ADMINISTRATIVE | Facility: HOSPITAL | Age: 49
End: 2019-09-06

## 2019-10-15 ENCOUNTER — HISTORICAL (OUTPATIENT)
Dept: ADMINISTRATIVE | Facility: HOSPITAL | Age: 49
End: 2019-10-15

## 2019-10-29 LAB
INFLUENZA A ANTIGEN, POC: NEGATIVE
INFLUENZA B ANTIGEN, POC: NEGATIVE
RAPID GROUP A STREP (OHS): NEGATIVE

## 2019-11-13 LAB
ABS NEUT (OLG): 2.52 X10(3)/MCL (ref 2.1–9.2)
APPEARANCE, UA: ABNORMAL
BACTERIA #/AREA URNS AUTO: ABNORMAL /HPF
BASOPHILS # BLD AUTO: 0 X10(3)/MCL (ref 0–0.2)
BASOPHILS NFR BLD AUTO: 1 %
BILIRUB UR QL STRIP: NEGATIVE
BUN SERPL-MCNC: 10 MG/DL (ref 7–18)
CALCIUM SERPL-MCNC: 9 MG/DL (ref 8.5–10.1)
CHLORIDE SERPL-SCNC: 110 MMOL/L (ref 98–107)
CO2 SERPL-SCNC: 27 MMOL/L (ref 21–32)
COLOR UR: YELLOW
CREAT SERPL-MCNC: 0.77 MG/DL (ref 0.55–1.02)
CREAT/UREA NIT SERPL: 13
EOSINOPHIL # BLD AUTO: 0.1 X10(3)/MCL (ref 0–0.9)
EOSINOPHIL NFR BLD AUTO: 2 %
ERYTHROCYTE [DISTWIDTH] IN BLOOD BY AUTOMATED COUNT: 17.2 % (ref 11.5–17)
GLUCOSE (UA): NEGATIVE
GLUCOSE SERPL-MCNC: 162 MG/DL (ref 74–106)
HCT VFR BLD AUTO: 39.7 % (ref 37–47)
HGB BLD-MCNC: 11.7 GM/DL (ref 12–16)
HGB UR QL STRIP: ABNORMAL
KETONES UR QL STRIP: NEGATIVE
LEUKOCYTE ESTERASE UR QL STRIP: NEGATIVE
LYMPHOCYTES # BLD AUTO: 1.8 X10(3)/MCL (ref 0.6–4.6)
LYMPHOCYTES NFR BLD AUTO: 38 %
MCH RBC QN AUTO: 26.2 PG (ref 27–31)
MCHC RBC AUTO-ENTMCNC: 29.5 GM/DL (ref 33–36)
MCV RBC AUTO: 89 FL (ref 80–94)
MONOCYTES # BLD AUTO: 0.2 X10(3)/MCL (ref 0.1–1.3)
MONOCYTES NFR BLD AUTO: 5 %
NEUTROPHILS # BLD AUTO: 2.52 X10(3)/MCL (ref 2.1–9.2)
NEUTROPHILS NFR BLD AUTO: 54 %
NITRITE UR QL STRIP.AUTO: NEGATIVE
PH UR STRIP: 5.5 [PH] (ref 5–9)
PLATELET # BLD AUTO: 235 X10(3)/MCL (ref 130–400)
PMV BLD AUTO: 9.5 FL (ref 9.4–12.4)
POTASSIUM SERPL-SCNC: 3.7 MMOL/L (ref 3.5–5.1)
PROT UR QL STRIP: NEGATIVE
RBC # BLD AUTO: 4.46 X10(6)/MCL (ref 4.2–5.4)
RBC #/AREA URNS HPF: ABNORMAL /HPF
SODIUM SERPL-SCNC: 144 MMOL/L (ref 136–145)
SP GR UR STRIP: 1.02 (ref 1–1.03)
SQUAMOUS EPITHELIAL, UA: ABNORMAL
UROBILINOGEN UR STRIP-ACNC: 0.2
WBC # SPEC AUTO: 4.7 X10(3)/MCL (ref 4.5–11.5)
WBC #/AREA URNS AUTO: ABNORMAL /[HPF]

## 2019-11-15 ENCOUNTER — HISTORICAL (OUTPATIENT)
Dept: ADMINISTRATIVE | Facility: HOSPITAL | Age: 49
End: 2019-11-15

## 2019-11-15 LAB
APTT PPP: 31.4 SECOND(S) (ref 24.2–33.9)
FINAL CULTURE: NO GROWTH
INR PPP: 1.6 (ref 0–1.3)
PROTHROMBIN TIME: 19 SECOND(S) (ref 12–14)

## 2019-11-29 ENCOUNTER — HISTORICAL (OUTPATIENT)
Dept: ADMINISTRATIVE | Facility: HOSPITAL | Age: 49
End: 2019-11-29

## 2019-12-26 ENCOUNTER — HISTORICAL (OUTPATIENT)
Dept: ADMINISTRATIVE | Facility: HOSPITAL | Age: 49
End: 2019-12-26

## 2020-01-02 ENCOUNTER — HISTORICAL (OUTPATIENT)
Dept: ADMINISTRATIVE | Facility: HOSPITAL | Age: 50
End: 2020-01-02

## 2020-01-16 ENCOUNTER — HISTORICAL (OUTPATIENT)
Dept: ADMINISTRATIVE | Facility: HOSPITAL | Age: 50
End: 2020-01-16

## 2020-02-06 ENCOUNTER — HISTORICAL (OUTPATIENT)
Dept: ADMINISTRATIVE | Facility: HOSPITAL | Age: 50
End: 2020-02-06

## 2020-03-05 ENCOUNTER — HISTORICAL (OUTPATIENT)
Dept: ADMINISTRATIVE | Facility: HOSPITAL | Age: 50
End: 2020-03-05

## 2020-03-07 LAB — FINAL CULTURE: NORMAL

## 2020-03-17 ENCOUNTER — HISTORICAL (OUTPATIENT)
Dept: ADMINISTRATIVE | Facility: HOSPITAL | Age: 50
End: 2020-03-17

## 2020-03-17 LAB
INFLUENZA A ANTIGEN, POC: NEGATIVE
INFLUENZA B ANTIGEN, POC: NEGATIVE

## 2020-03-29 ENCOUNTER — HISTORICAL (OUTPATIENT)
Dept: ADMINISTRATIVE | Facility: HOSPITAL | Age: 50
End: 2020-03-29

## 2020-04-21 ENCOUNTER — HISTORICAL (OUTPATIENT)
Dept: ADMINISTRATIVE | Facility: HOSPITAL | Age: 50
End: 2020-04-21

## 2020-05-12 ENCOUNTER — HISTORICAL (OUTPATIENT)
Dept: ADMINISTRATIVE | Facility: HOSPITAL | Age: 50
End: 2020-05-12

## 2020-05-13 ENCOUNTER — HISTORICAL (OUTPATIENT)
Dept: ADMINISTRATIVE | Facility: HOSPITAL | Age: 50
End: 2020-05-13

## 2020-05-26 ENCOUNTER — HISTORICAL (OUTPATIENT)
Dept: ADMINISTRATIVE | Facility: HOSPITAL | Age: 50
End: 2020-05-26

## 2020-06-19 ENCOUNTER — HISTORICAL (OUTPATIENT)
Dept: ADMINISTRATIVE | Facility: HOSPITAL | Age: 50
End: 2020-06-19

## 2020-07-14 ENCOUNTER — HISTORICAL (OUTPATIENT)
Dept: ADMINISTRATIVE | Facility: HOSPITAL | Age: 50
End: 2020-07-14

## 2020-07-16 ENCOUNTER — HISTORICAL (OUTPATIENT)
Dept: ADMINISTRATIVE | Facility: HOSPITAL | Age: 50
End: 2020-07-16

## 2020-07-21 ENCOUNTER — HISTORICAL (OUTPATIENT)
Dept: ADMINISTRATIVE | Facility: HOSPITAL | Age: 50
End: 2020-07-21

## 2020-08-11 ENCOUNTER — HISTORICAL (OUTPATIENT)
Dept: ADMINISTRATIVE | Facility: HOSPITAL | Age: 50
End: 2020-08-11

## 2020-09-03 ENCOUNTER — HISTORICAL (OUTPATIENT)
Dept: ADMINISTRATIVE | Facility: HOSPITAL | Age: 50
End: 2020-09-03

## 2020-09-23 ENCOUNTER — HISTORICAL (OUTPATIENT)
Dept: ADMINISTRATIVE | Facility: HOSPITAL | Age: 50
End: 2020-09-23

## 2020-10-20 ENCOUNTER — HOSPITAL ENCOUNTER (OUTPATIENT)
Dept: OBSTETRICS AND GYNECOLOGY | Facility: HOSPITAL | Age: 50
End: 2020-10-21
Attending: SPECIALIST | Admitting: SPECIALIST

## 2020-10-20 LAB
ABS NEUT (OLG): 15.38 X10(3)/MCL (ref 2.1–9.2)
ALBUMIN SERPL-MCNC: 3.9 GM/DL (ref 3.5–5)
ALBUMIN/GLOB SERPL: 1.4 RATIO (ref 1.1–2)
ALP SERPL-CCNC: 130 UNIT/L (ref 40–150)
ALT SERPL-CCNC: 22 UNIT/L (ref 0–55)
AST SERPL-CCNC: 26 UNIT/L (ref 5–34)
BASOPHILS # BLD AUTO: 0.07 X10(3)/MCL (ref 0–0.2)
BASOPHILS NFR BLD AUTO: 0.4 % (ref 0–1)
BILIRUB SERPL-MCNC: 0.5 MG/DL (ref 0.2–1.2)
BILIRUBIN DIRECT+TOT PNL SERPL-MCNC: 0.2 MG/DL (ref 0–0.5)
BILIRUBIN DIRECT+TOT PNL SERPL-MCNC: 0.3 MG/DL (ref 0–0.8)
BUN SERPL-MCNC: 14.6 MG/DL (ref 7–18.7)
CALCIUM SERPL-MCNC: 10 MG/DL (ref 8.4–10.2)
CHLORIDE SERPL-SCNC: 107 MMOL/L (ref 98–107)
CO2 SERPL-SCNC: 22 MMOL/L (ref 22–29)
CREAT SERPL-MCNC: 0.79 MG/DL (ref 0.57–1.11)
EOSINOPHIL # BLD AUTO: 0.03 X10(3)/MCL (ref 0–0.9)
EOSINOPHIL NFR BLD AUTO: 0.2 % (ref 0–6.4)
ERYTHROCYTE [DISTWIDTH] IN BLOOD BY AUTOMATED COUNT: 16.3 % (ref 11.5–17)
GLOBULIN SER-MCNC: 2.7 GM/DL (ref 2.4–3.5)
GLUCOSE SERPL-MCNC: 118 MG/DL (ref 74–100)
HCT VFR BLD AUTO: 30.8 % (ref 37–47)
HGB BLD-MCNC: 9.1 GM/DL (ref 12–16)
HYPOCHROMIA BLD QL SMEAR: NORMAL
IMM GRANULOCYTES # BLD AUTO: 0.09 10*3/UL (ref 0–0.02)
IMM GRANULOCYTES NFR BLD AUTO: 0.5 % (ref 0–0.43)
INR PPP: 2.1 (ref 2–3)
LYMPHOCYTES # BLD AUTO: 2.17 X10(3)/MCL (ref 0.6–4.6)
LYMPHOCYTES NFR BLD AUTO: 11.6 % (ref 16–44)
MCH RBC QN AUTO: 23.9 PG (ref 27–31)
MCHC RBC AUTO-ENTMCNC: 29.5 GM/DL (ref 33–36)
MCV RBC AUTO: 81.1 FL (ref 80–94)
MONOCYTES # BLD AUTO: 0.93 X10(3)/MCL (ref 0.1–1.3)
MONOCYTES NFR BLD AUTO: 5 % (ref 4–12.1)
NEUTROPHILS # BLD AUTO: 15.38 X10(3)/MCL (ref 2.1–9.2)
NEUTROPHILS NFR BLD AUTO: 82.3 % (ref 43–73)
NRBC BLD AUTO-RTO: 0 % (ref 0–0.2)
PLATELET # BLD AUTO: 289 X10(3)/MCL (ref 130–400)
PLATELET # BLD EST: ADEQUATE 10*3/UL
PMV BLD AUTO: 10.3 FL (ref 7.4–10.4)
POTASSIUM SERPL-SCNC: 4.2 MMOL/L (ref 3.5–5.1)
PROT SERPL-MCNC: 6.6 GM/DL (ref 6.4–8.3)
PROTHROMBIN TIME: 23.6 SEC (ref 11.7–14.5)
RBC # BLD AUTO: 3.8 X10(6)/MCL (ref 4.2–5.4)
SODIUM SERPL-SCNC: 139 MMOL/L (ref 136–145)
WBC # SPEC AUTO: 18.7 X10(3)/MCL (ref 4.5–11.5)

## 2020-10-21 LAB
ABS NEUT (OLG): 12.52 X10(3)/MCL (ref 2.1–9.2)
BASOPHILS # BLD AUTO: 0 X10(3)/MCL (ref 0–0.2)
BASOPHILS NFR BLD AUTO: 0 %
CROSSMATCH INTERPRETATION: NORMAL
EOSINOPHIL # BLD AUTO: 0 X10(3)/MCL (ref 0–0.9)
EOSINOPHIL NFR BLD AUTO: 0 %
ERYTHROCYTE [DISTWIDTH] IN BLOOD BY AUTOMATED COUNT: 16.4 % (ref 11.5–17)
ERYTHROCYTE [DISTWIDTH] IN BLOOD BY AUTOMATED COUNT: 16.6 % (ref 11.5–17)
GROUP & RH: NORMAL
HCT VFR BLD AUTO: 25.7 % (ref 37–47)
HCT VFR BLD AUTO: 27.6 % (ref 37–47)
HGB BLD-MCNC: 7.4 GM/DL (ref 12–16)
HGB BLD-MCNC: 8 GM/DL (ref 12–16)
LYMPHOCYTES # BLD AUTO: 3.6 X10(3)/MCL (ref 0.6–4.6)
LYMPHOCYTES NFR BLD AUTO: 21 %
MCH RBC QN AUTO: 24 PG (ref 27–31)
MCH RBC QN AUTO: 24.2 PG (ref 27–31)
MCHC RBC AUTO-ENTMCNC: 28.8 GM/DL (ref 33–36)
MCHC RBC AUTO-ENTMCNC: 29 GM/DL (ref 33–36)
MCV RBC AUTO: 83.4 FL (ref 80–94)
MCV RBC AUTO: 83.6 FL (ref 80–94)
MONOCYTES # BLD AUTO: 1 X10(3)/MCL (ref 0.1–1.3)
MONOCYTES NFR BLD AUTO: 6 %
NEUTROPHILS # BLD AUTO: 12.52 X10(3)/MCL (ref 2.1–9.2)
NEUTROPHILS NFR BLD AUTO: 72 %
PLATELET # BLD AUTO: 254 X10(3)/MCL (ref 130–400)
PLATELET # BLD AUTO: 255 X10(3)/MCL (ref 130–400)
PMV BLD AUTO: 10.2 FL (ref 9.4–12.4)
PMV BLD AUTO: 10.5 FL (ref 9.4–12.4)
PRODUCT READY: NORMAL
RBC # BLD AUTO: 3.08 X10(6)/MCL (ref 4.2–5.4)
RBC # BLD AUTO: 3.3 X10(6)/MCL (ref 4.2–5.4)
WBC # SPEC AUTO: 17.3 X10(3)/MCL (ref 4.5–11.5)
WBC # SPEC AUTO: 18.4 X10(3)/MCL (ref 4.5–11.5)

## 2020-11-10 ENCOUNTER — HISTORICAL (OUTPATIENT)
Dept: LAB | Facility: HOSPITAL | Age: 50
End: 2020-11-10

## 2020-11-10 LAB
INR PPP: 2.2 (ref 2–3)
PROTHROMBIN TIME: 24.7 SEC (ref 11.7–14.5)

## 2020-12-03 ENCOUNTER — HISTORICAL (OUTPATIENT)
Dept: ADMINISTRATIVE | Facility: HOSPITAL | Age: 50
End: 2020-12-03

## 2020-12-03 LAB
INR PPP: 1.7 (ref 2–3)
PROTHROMBIN TIME: 19.7 SEC (ref 11.7–14.5)

## 2020-12-09 ENCOUNTER — HISTORICAL (OUTPATIENT)
Dept: ADMINISTRATIVE | Facility: HOSPITAL | Age: 50
End: 2020-12-09

## 2020-12-23 ENCOUNTER — HISTORICAL (OUTPATIENT)
Dept: LAB | Facility: HOSPITAL | Age: 50
End: 2020-12-23

## 2020-12-23 LAB
INR PPP: 3.1 (ref 2–3)
PROTHROMBIN TIME: 32.4 SEC (ref 11.7–14.5)

## 2021-01-12 ENCOUNTER — HISTORICAL (OUTPATIENT)
Dept: LAB | Facility: HOSPITAL | Age: 51
End: 2021-01-12

## 2021-01-12 LAB
INR PPP: 2 (ref 2–3)
PROTHROMBIN TIME: 22.6 SEC (ref 11.7–14.5)

## 2021-01-13 LAB
BILIRUB SERPL-MCNC: NORMAL MG/DL
BLOOD URINE, POC: NORMAL
CLARITY, POC UA: CLEAR
COLOR, POC UA: NORMAL
GLUCOSE UR QL STRIP: NORMAL
KETONES UR QL STRIP: NORMAL
LEUKOCYTE EST, POC UA: NORMAL
NITRITE, POC UA: POSITIVE
PH, POC UA: 5
PROTEIN, POC: NORMAL
SPECIFIC GRAVITY, POC UA: 1.02
UROBILINOGEN, POC UA: NORMAL

## 2021-02-08 ENCOUNTER — HISTORICAL (OUTPATIENT)
Dept: LAB | Facility: HOSPITAL | Age: 51
End: 2021-02-08

## 2021-02-08 LAB
INR PPP: 2.2 (ref 2–3)
PROTHROMBIN TIME: 24.1 SEC (ref 11.7–14.5)

## 2021-03-01 ENCOUNTER — HISTORICAL (OUTPATIENT)
Dept: LAB | Facility: HOSPITAL | Age: 51
End: 2021-03-01

## 2021-03-01 LAB
INR PPP: 2.2 (ref 2–3)
PROTHROMBIN TIME: 24.1 SEC (ref 11.7–14.5)

## 2021-03-25 ENCOUNTER — HISTORICAL (OUTPATIENT)
Dept: LAB | Facility: HOSPITAL | Age: 51
End: 2021-03-25

## 2021-03-25 LAB
INR PPP: 3.5 (ref 0–1.3)
PROTHROMBIN TIME: 34.9 SECOND(S) (ref 11.1–13.7)

## 2021-04-22 LAB — SARS-COV-2 RNA RESP QL NAA+PROBE: NEGATIVE

## 2021-09-08 ENCOUNTER — HISTORICAL (OUTPATIENT)
Dept: ADMINISTRATIVE | Facility: HOSPITAL | Age: 51
End: 2021-09-08

## 2021-09-08 LAB
INFLUENZA A ANTIGEN, POC: NEGATIVE
INFLUENZA B ANTIGEN, POC: NEGATIVE
SARS-COV-2 RNA RESP QL NAA+PROBE: NEGATIVE

## 2021-11-23 ENCOUNTER — HISTORICAL (OUTPATIENT)
Dept: ADMINISTRATIVE | Facility: HOSPITAL | Age: 51
End: 2021-11-23

## 2021-11-23 LAB
ABS NEUT (OLG): 2.23 X10(3)/MCL (ref 2.1–9.2)
ALBUMIN SERPL-MCNC: 3.8 GM/DL (ref 3.5–5)
ALBUMIN/GLOB SERPL: 1.4 RATIO (ref 1.1–2)
ALP SERPL-CCNC: 118 UNIT/L (ref 40–150)
ALT SERPL-CCNC: 40 UNIT/L (ref 0–55)
AMYLASE SERPL-CCNC: 54 UNIT/L (ref 25–125)
APPEARANCE, UA: ABNORMAL
AST SERPL-CCNC: 51 UNIT/L (ref 5–34)
BACTERIA SPEC CULT: ABNORMAL /HPF
BASOPHILS # BLD AUTO: 0 X10(3)/MCL (ref 0–0.2)
BASOPHILS NFR BLD AUTO: 1 %
BILIRUB SERPL-MCNC: 0.3 MG/DL
BILIRUB UR QL STRIP: NEGATIVE
BILIRUBIN DIRECT+TOT PNL SERPL-MCNC: 0.1 MG/DL (ref 0–0.8)
BILIRUBIN DIRECT+TOT PNL SERPL-MCNC: 0.2 MG/DL (ref 0–0.5)
BUN SERPL-MCNC: 9.8 MG/DL (ref 7–18.7)
CALCIUM SERPL-MCNC: 9.2 MG/DL (ref 8.7–10.5)
CHLORIDE SERPL-SCNC: 111 MMOL/L (ref 98–107)
CHOLEST SERPL-MCNC: 139 MG/DL
CHOLEST/HDLC SERPL: 2 {RATIO} (ref 0–5)
CO2 SERPL-SCNC: 25 MMOL/L (ref 22–29)
COLOR UR: ABNORMAL
CREAT SERPL-MCNC: 0.72 MG/DL (ref 0.55–1.02)
EOSINOPHIL # BLD AUTO: 0.1 X10(3)/MCL (ref 0–0.9)
EOSINOPHIL NFR BLD AUTO: 2 %
ERYTHROCYTE [DISTWIDTH] IN BLOOD BY AUTOMATED COUNT: 19.6 % (ref 11.5–17)
FERRITIN SERPL-MCNC: 6.1 NG/ML (ref 4.63–204)
GLOBULIN SER-MCNC: 2.7 GM/DL (ref 2.4–3.5)
GLUCOSE (UA): NEGATIVE
GLUCOSE SERPL-MCNC: 54 MG/DL (ref 74–100)
HCT VFR BLD AUTO: 29.8 % (ref 37–47)
HDLC SERPL-MCNC: 69 MG/DL (ref 35–60)
HGB BLD-MCNC: 8.2 GM/DL (ref 12–16)
HGB UR QL STRIP: NEGATIVE
IRON SATN MFR SERPL: 4 % (ref 20–50)
IRON SERPL-MCNC: 17 UG/DL (ref 50–170)
KETONES UR QL STRIP: ABNORMAL
LDLC SERPL CALC-MCNC: 53 MG/DL (ref 50–140)
LEUKOCYTE ESTERASE UR QL STRIP: ABNORMAL
LIPASE SERPL-CCNC: 45 U/L
LYMPHOCYTES # BLD AUTO: 1.9 X10(3)/MCL (ref 0.6–4.6)
LYMPHOCYTES NFR BLD AUTO: 40 %
MCH RBC QN AUTO: 19.5 PG (ref 27–31)
MCHC RBC AUTO-ENTMCNC: 27.5 GM/DL (ref 33–36)
MCV RBC AUTO: 71 FL (ref 80–94)
MONOCYTES # BLD AUTO: 0.5 X10(3)/MCL (ref 0.1–1.3)
MONOCYTES NFR BLD AUTO: 10 %
NEUTROPHILS # BLD AUTO: 2.23 X10(3)/MCL (ref 2.1–9.2)
NEUTROPHILS NFR BLD AUTO: 47 %
NITRITE UR QL STRIP: NEGATIVE
PH UR STRIP: 5.5 [PH] (ref 5–9)
PLATELET # BLD AUTO: 320 X10(3)/MCL (ref 130–400)
PMV BLD AUTO: 9.9 FL (ref 9.4–12.4)
POTASSIUM SERPL-SCNC: 3.7 MMOL/L (ref 3.5–5.1)
PROT SERPL-MCNC: 6.5 GM/DL (ref 6.4–8.3)
PROT UR QL STRIP: NEGATIVE
RBC # BLD AUTO: 4.2 X10(6)/MCL (ref 4.2–5.4)
RBC #/AREA URNS HPF: ABNORMAL /[HPF]
SODIUM SERPL-SCNC: 142 MMOL/L (ref 136–145)
SP GR UR STRIP: 1.02 (ref 1–1.03)
SQUAMOUS EPITHELIAL, UA: 17 /HPF (ref 0–4)
TIBC SERPL-MCNC: 390 UG/DL (ref 70–310)
TIBC SERPL-MCNC: 407 UG/DL (ref 250–450)
TRANSFERRIN SERPL-MCNC: 384 MG/DL (ref 180–382)
TRIGL SERPL-MCNC: 87 MG/DL (ref 37–140)
TSH SERPL-ACNC: 1.4 UIU/ML (ref 0.35–4.94)
UROBILINOGEN UR STRIP-ACNC: 1
VLDLC SERPL CALC-MCNC: 17 MG/DL
WBC # SPEC AUTO: 4.7 X10(3)/MCL (ref 4.5–11.5)
WBC #/AREA URNS HPF: 5 /HPF (ref 0–3)

## 2021-11-25 LAB — FINAL CULTURE: NORMAL

## 2022-02-07 ENCOUNTER — HISTORICAL (OUTPATIENT)
Dept: ADMINISTRATIVE | Facility: HOSPITAL | Age: 52
End: 2022-02-07

## 2022-02-18 ENCOUNTER — HISTORICAL (OUTPATIENT)
Dept: PREADMISSION TESTING | Facility: HOSPITAL | Age: 52
End: 2022-02-18

## 2022-02-18 ENCOUNTER — HISTORICAL (OUTPATIENT)
Dept: ADMINISTRATIVE | Facility: HOSPITAL | Age: 52
End: 2022-02-18

## 2022-02-18 LAB
ABS NEUT (OLG): 3.03 (ref 2.1–9.2)
BASOPHILS # BLD AUTO: 0 10*3/UL (ref 0–0.2)
BASOPHILS NFR BLD AUTO: 1 %
BUN SERPL-MCNC: 11.2 MG/DL (ref 7–18.7)
CALCIUM SERPL-MCNC: 9.4 MG/DL (ref 8.7–10.5)
CHLORIDE SERPL-SCNC: 109 MMOL/L (ref 98–107)
CO2 SERPL-SCNC: 26 MMOL/L (ref 22–29)
CREAT SERPL-MCNC: 0.85 MG/DL (ref 0.55–1.02)
CREAT/UREA NIT SERPL: 13
EOSINOPHIL # BLD AUTO: 0.1 10*3/UL (ref 0–0.9)
EOSINOPHIL NFR BLD AUTO: 2 %
ERYTHROCYTE [DISTWIDTH] IN BLOOD BY AUTOMATED COUNT: 21.6 % (ref 11.5–17)
GLUCOSE SERPL-MCNC: 141 MG/DL (ref 74–100)
HCT VFR BLD AUTO: 32.4 % (ref 37–47)
HEMOLYSIS INTERF INDEX SERPL-ACNC: 1
HGB BLD-MCNC: 9.4 G/DL (ref 12–16)
ICTERIC INTERF INDEX SERPL-ACNC: 0
LIPEMIC INTERF INDEX SERPL-ACNC: <0
LYMPHOCYTES # BLD AUTO: 1.5 10*3/UL (ref 0.6–4.6)
LYMPHOCYTES NFR BLD AUTO: 31 %
MANUAL DIFF? (OHS): NO
MCH RBC QN AUTO: 22.2 PG (ref 27–31)
MCHC RBC AUTO-ENTMCNC: 29 G/DL (ref 33–36)
MCV RBC AUTO: 76.4 FL (ref 80–94)
MONOCYTES # BLD AUTO: 0.2 10*3/UL (ref 0.1–1.3)
MONOCYTES NFR BLD AUTO: 5 %
NEUTROPHILS # BLD AUTO: 3.03 10*3/UL (ref 2.1–9.2)
NEUTROPHILS NFR BLD AUTO: 61 %
PLATELET # BLD AUTO: 274 10*3/UL (ref 130–400)
PMV BLD AUTO: 10.5 FL (ref 9.4–12.4)
POS ERR1 (OHS): NORMAL
POTASSIUM SERPL-SCNC: 4.1 MMOL/L (ref 3.5–5.1)
RBC # BLD AUTO: 4.24 10*6/UL (ref 4.2–5.4)
SARS-COV-2 RNA RESP QL NAA+PROBE: NOT DETECTED
SODIUM SERPL-SCNC: 143 MMOL/L (ref 136–145)
WBC # SPEC AUTO: 5 10*3/UL (ref 4.5–11.5)

## 2022-02-22 ENCOUNTER — HISTORICAL (OUTPATIENT)
Dept: SURGERY | Facility: HOSPITAL | Age: 52
End: 2022-02-22

## 2022-02-24 ENCOUNTER — HOSPITAL ENCOUNTER (EMERGENCY)
Facility: HOSPITAL | Age: 52
Discharge: HOME | End: 2022-02-24
Attending: EMERGENCY MEDICINE
Payer: COMMERCIAL

## 2022-02-24 VITALS
SYSTOLIC BLOOD PRESSURE: 105 MMHG | DIASTOLIC BLOOD PRESSURE: 55 MMHG | WEIGHT: 213.06 LBS | TEMPERATURE: 98 F | RESPIRATION RATE: 18 BRPM | HEART RATE: 54 BPM | OXYGEN SATURATION: 99 %

## 2022-02-24 DIAGNOSIS — R10.9 LEFT FLANK PAIN: ICD-10-CM

## 2022-02-24 DIAGNOSIS — N20.0 NEPHROLITHIASIS: Primary | ICD-10-CM

## 2022-02-24 LAB
ALBUMIN SERPL BCP-MCNC: 3.7 G/DL (ref 3.5–5.2)
ALP SERPL-CCNC: 105 U/L (ref 55–135)
ALT SERPL W/O P-5'-P-CCNC: 25 U/L (ref 10–44)
ANION GAP SERPL CALC-SCNC: 6 MMOL/L (ref 8–16)
APTT BLDCRRT: 27.6 SEC (ref 21–32)
AST SERPL-CCNC: 47 U/L (ref 10–40)
BACTERIA #/AREA URNS HPF: ABNORMAL /HPF
BASOPHILS # BLD AUTO: 0.03 K/UL (ref 0–0.2)
BASOPHILS NFR BLD: 0.5 % (ref 0–1.9)
BILIRUB SERPL-MCNC: 0.3 MG/DL (ref 0.1–1)
BILIRUB UR QL STRIP: NEGATIVE
BUN SERPL-MCNC: 16 MG/DL (ref 6–20)
CALCIUM SERPL-MCNC: 8.5 MG/DL (ref 8.7–10.5)
CHLORIDE SERPL-SCNC: 107 MMOL/L (ref 95–110)
CLARITY UR: CLEAR
CO2 SERPL-SCNC: 30 MMOL/L (ref 23–29)
COLOR UR: YELLOW
CREAT SERPL-MCNC: 0.9 MG/DL (ref 0.5–1.4)
CTP QC/QA: YES
DIFFERENTIAL METHOD: ABNORMAL
EOSINOPHIL # BLD AUTO: 0.1 K/UL (ref 0–0.5)
EOSINOPHIL NFR BLD: 1.2 % (ref 0–8)
ERYTHROCYTE [DISTWIDTH] IN BLOOD BY AUTOMATED COUNT: 21.3 % (ref 11.5–14.5)
EST. GFR  (AFRICAN AMERICAN): >60 ML/MIN/1.73 M^2
EST. GFR  (NON AFRICAN AMERICAN): >60 ML/MIN/1.73 M^2
GLUCOSE SERPL-MCNC: 82 MG/DL (ref 70–110)
GLUCOSE UR QL STRIP: NEGATIVE
HCT VFR BLD AUTO: 29 % (ref 37–48.5)
HGB BLD-MCNC: 8.3 G/DL (ref 12–16)
HGB UR QL STRIP: ABNORMAL
IMM GRANULOCYTES # BLD AUTO: 0.01 K/UL (ref 0–0.04)
IMM GRANULOCYTES NFR BLD AUTO: 0.2 % (ref 0–0.5)
INR PPP: 1.1 (ref 0.8–1.2)
KETONES UR QL STRIP: NEGATIVE
LACTATE SERPL-SCNC: 0.7 MMOL/L (ref 0.5–2.2)
LEUKOCYTE ESTERASE UR QL STRIP: ABNORMAL
LYMPHOCYTES # BLD AUTO: 2.2 K/UL (ref 1–4.8)
LYMPHOCYTES NFR BLD: 34.5 % (ref 18–48)
MCH RBC QN AUTO: 22.1 PG (ref 27–31)
MCHC RBC AUTO-ENTMCNC: 28.6 G/DL (ref 32–36)
MCV RBC AUTO: 77 FL (ref 82–98)
MICROSCOPIC COMMENT: ABNORMAL
MONOCYTES # BLD AUTO: 0.5 K/UL (ref 0.3–1)
MONOCYTES NFR BLD: 7.9 % (ref 4–15)
NEUTROPHILS # BLD AUTO: 3.6 K/UL (ref 1.8–7.7)
NEUTROPHILS NFR BLD: 55.7 % (ref 38–73)
NITRITE UR QL STRIP: NEGATIVE
NRBC BLD-RTO: 0 /100 WBC
PH UR STRIP: 6 [PH] (ref 5–8)
PLATELET # BLD AUTO: 210 K/UL (ref 150–450)
PMV BLD AUTO: 9.9 FL (ref 9.2–12.9)
POTASSIUM SERPL-SCNC: 3.9 MMOL/L (ref 3.5–5.1)
PROT SERPL-MCNC: 6.3 G/DL (ref 6–8.4)
PROT UR QL STRIP: NEGATIVE
PROTHROMBIN TIME: 12.3 SEC (ref 9–12.5)
RBC # BLD AUTO: 3.76 M/UL (ref 4–5.4)
RBC #/AREA URNS HPF: >100 /HPF (ref 0–4)
SARS-COV-2 RDRP RESP QL NAA+PROBE: NEGATIVE
SODIUM SERPL-SCNC: 143 MMOL/L (ref 136–145)
SP GR UR STRIP: 1.01 (ref 1–1.03)
SQUAMOUS #/AREA URNS HPF: 2 /HPF
URN SPEC COLLECT METH UR: ABNORMAL
UROBILINOGEN UR STRIP-ACNC: NEGATIVE EU/DL
WBC # BLD AUTO: 6.43 K/UL (ref 3.9–12.7)
WBC #/AREA URNS HPF: 5 /HPF (ref 0–5)

## 2022-02-24 PROCEDURE — 63600175 PHARM REV CODE 636 W HCPCS: Performed by: EMERGENCY MEDICINE

## 2022-02-24 PROCEDURE — 96375 TX/PRO/DX INJ NEW DRUG ADDON: CPT

## 2022-02-24 PROCEDURE — 85730 THROMBOPLASTIN TIME PARTIAL: CPT | Performed by: EMERGENCY MEDICINE

## 2022-02-24 PROCEDURE — 96374 THER/PROPH/DIAG INJ IV PUSH: CPT

## 2022-02-24 PROCEDURE — 83605 ASSAY OF LACTIC ACID: CPT | Performed by: EMERGENCY MEDICINE

## 2022-02-24 PROCEDURE — 85610 PROTHROMBIN TIME: CPT | Performed by: EMERGENCY MEDICINE

## 2022-02-24 PROCEDURE — 25000003 PHARM REV CODE 250: Performed by: EMERGENCY MEDICINE

## 2022-02-24 PROCEDURE — 96361 HYDRATE IV INFUSION ADD-ON: CPT

## 2022-02-24 PROCEDURE — U0002 COVID-19 LAB TEST NON-CDC: HCPCS | Performed by: EMERGENCY MEDICINE

## 2022-02-24 PROCEDURE — 80053 COMPREHEN METABOLIC PANEL: CPT | Performed by: EMERGENCY MEDICINE

## 2022-02-24 PROCEDURE — 51798 US URINE CAPACITY MEASURE: CPT

## 2022-02-24 PROCEDURE — 85025 COMPLETE CBC W/AUTO DIFF WBC: CPT | Performed by: EMERGENCY MEDICINE

## 2022-02-24 PROCEDURE — 81000 URINALYSIS NONAUTO W/SCOPE: CPT | Performed by: EMERGENCY MEDICINE

## 2022-02-24 PROCEDURE — 99284 EMERGENCY DEPT VISIT MOD MDM: CPT | Mod: 25

## 2022-02-24 RX ORDER — ONDANSETRON 2 MG/ML
4 INJECTION INTRAMUSCULAR; INTRAVENOUS
Status: COMPLETED | OUTPATIENT
Start: 2022-02-24 | End: 2022-02-24

## 2022-02-24 RX ORDER — HYDROCODONE BITARTRATE AND ACETAMINOPHEN 5; 325 MG/1; MG/1
1 TABLET ORAL EVERY 6 HOURS PRN
COMMUNITY
End: 2022-05-27

## 2022-02-24 RX ORDER — ESZOPICLONE 3 MG/1
3 TABLET, FILM COATED ORAL NIGHTLY
COMMUNITY
End: 2022-05-27

## 2022-02-24 RX ORDER — ENOXAPARIN SODIUM 100 MG/ML
100 INJECTION SUBCUTANEOUS DAILY
COMMUNITY

## 2022-02-24 RX ORDER — QUETIAPINE FUMARATE 400 MG/1
400 TABLET, FILM COATED ORAL NIGHTLY
COMMUNITY

## 2022-02-24 RX ORDER — CEFDINIR 300 MG/1
300 CAPSULE ORAL 2 TIMES DAILY
COMMUNITY
End: 2022-02-25

## 2022-02-24 RX ORDER — KETOROLAC TROMETHAMINE 10 MG/1
10 TABLET, FILM COATED ORAL 2 TIMES DAILY
COMMUNITY
End: 2022-05-27

## 2022-02-24 RX ORDER — METOPROLOL SUCCINATE 25 MG/1
25 TABLET, EXTENDED RELEASE ORAL DAILY
COMMUNITY

## 2022-02-24 RX ORDER — SERTRALINE HYDROCHLORIDE 100 MG/1
200 TABLET, FILM COATED ORAL DAILY
COMMUNITY

## 2022-02-24 RX ORDER — MORPHINE SULFATE 4 MG/ML
4 INJECTION, SOLUTION INTRAMUSCULAR; INTRAVENOUS
Status: COMPLETED | OUTPATIENT
Start: 2022-02-24 | End: 2022-02-24

## 2022-02-24 RX ORDER — ROSUVASTATIN CALCIUM 20 MG/1
20 TABLET, COATED ORAL NIGHTLY
COMMUNITY

## 2022-02-24 RX ORDER — ESTRADIOL 0.5 MG/1
0.5 TABLET ORAL DAILY
COMMUNITY
End: 2022-10-19 | Stop reason: SDUPTHER

## 2022-02-24 RX ORDER — FUROSEMIDE 40 MG/1
40 TABLET ORAL DAILY
COMMUNITY

## 2022-02-24 RX ORDER — HYDROCODONE BITARTRATE AND ACETAMINOPHEN 5; 325 MG/1; MG/1
1 TABLET ORAL
Status: COMPLETED | OUTPATIENT
Start: 2022-02-24 | End: 2022-02-24

## 2022-02-24 RX ORDER — WARFARIN 3 MG/1
3 TABLET ORAL NIGHTLY
COMMUNITY
End: 2022-05-27

## 2022-02-24 RX ORDER — OXYBUTYNIN CHLORIDE 10 MG/1
10 TABLET, EXTENDED RELEASE ORAL DAILY PRN
COMMUNITY
End: 2022-05-27

## 2022-02-24 RX ORDER — TEMAZEPAM 15 MG/1
15 CAPSULE ORAL NIGHTLY PRN
COMMUNITY

## 2022-02-24 RX ADMIN — ONDANSETRON 4 MG: 2 INJECTION INTRAMUSCULAR; INTRAVENOUS at 10:02

## 2022-02-24 RX ADMIN — MORPHINE SULFATE 4 MG: 4 INJECTION INTRAVENOUS at 10:02

## 2022-02-24 RX ADMIN — SODIUM CHLORIDE 1000 ML: 0.9 INJECTION, SOLUTION INTRAVENOUS at 01:02

## 2022-02-24 RX ADMIN — HYDROCODONE BITARTRATE AND ACETAMINOPHEN 1 TABLET: 5; 325 TABLET ORAL at 05:02

## 2022-02-24 RX ADMIN — SODIUM CHLORIDE 1000 ML: 0.9 INJECTION, SOLUTION INTRAVENOUS at 11:02

## 2022-02-24 NOTE — PHARMACY MED REC
"Admission Medication History     The home medication history was taken by Hernesto Castle.    You may go to "Admission" then "Reconcile Home Medications" tabs to review and/or act upon these items.      The home medication list has been updated by the Pharmacy department.    Please read ALL comments highlighted in yellow.    Please address this information as you see fit.     Feel free to contact us if you have any questions or require assistance.        Medications listed below were obtained from: Patient/family and Analytic software- [employer]  (Not in a hospital admission)      Potential issues to be addressed PRIOR TO DISCHARGE: NONE      Hernesto Castle, [facility] [phone]  Please secure chat me! =)        Current Outpatient Medications on File Prior to Encounter   Medication Sig Dispense Refill Last Dose    cefdinir (OMNICEF) 300 MG capsule Take 300 mg by mouth 2 (two) times daily. (for 3 days.)   2/24/2022 at Unknown time    enoxaparin (LOVENOX) 100 mg/mL Syrg Inject 100 mg into the skin once daily. (for 7 days.)   2/23/2022 at Unknown time    estradioL (ESTRACE) 0.5 MG tablet Take 0.5 mg by mouth once daily.   2/23/2022 at Unknown time    eszopiclone (LUNESTA) 3 mg Tab Take 3 mg by mouth every evening.   2/23/2022 at Unknown time    furosemide (LASIX) 40 MG tablet Take 40 mg by mouth once daily.   2/24/2022 at Unknown time    HYDROcodone-acetaminophen (NORCO) 5-325 mg per tablet Take 1 tablet by mouth every 6 (six) hours as needed for Pain.   2/24/2022 at Unknown time    ketorolac (TORADOL) 10 mg tablet Take 10 mg by mouth 2 (two) times a day.   2/24/2022 at Unknown time    metoprolol succinate (TOPROL-XL) 25 MG 24 hr tablet Take 25 mg by mouth once daily.   2/23/2022 at Unknown time    oxybutynin (DITROPAN-XL) 10 MG 24 hr tablet Take 10 mg by mouth daily as needed.   2/24/2022 at Unknown time    QUEtiapine (SEROQUEL) 400 MG tablet Take 400 mg by mouth every evening.   2/23/2022 at " Unknown time    rosuvastatin (CRESTOR) 20 MG tablet Take 20 mg by mouth every evening.   2/23/2022 at Unknown time    sertraline (ZOLOFT) 100 MG tablet Take 200 mg by mouth once daily.   2/23/2022 at Unknown time    temazepam (RESTORIL) 15 mg Cap Take 15 mg by mouth nightly as needed.   2/23/2022 at Unknown time    warfarin (COUMADIN) 3 MG tablet Take 3 mg by mouth every evening. Except Tuesday & Thursday take one and one-half tablet (4.5 mg) in the evening or as directed by [facility].   2/23/2022 at Unknown time                             .

## 2022-02-24 NOTE — ED PROVIDER NOTES
SCRIBE #1 NOTE: I, Miguel A Giron, am scribing for, and in the presence of, Richard Cuadra MD. I have scribed the entire note.      History     Chief Complaint   Patient presents with    Flank Pain     Pt reports recent lithotripsy for treatment of renal calculi on her left side last Tuesday. Pt now reports blood in urine, pain, and urgency to urinate without results.     Review of patient's allergies indicates:   Allergen Reactions    Niacin Rash    Remeron [mirtazapine] Anxiety and Other (See Comments)     Causes nerves to be on fire like fibromyalgia pain.         History of Present Illness     HPI    2/24/2022, 10:21 AM  History obtained from the patient      History of Present Illness: Holly Starr is a 51 y.o. female patient who presents to the Emergency Department for evaluation of left flank pain which worsened following left side lithotripsy two days ago. Pt reports pain and gross hematuria since the surgery on Tuesday, performed by Dr. Roper (Urology). Pt was prescribed Cefdinir, Flomax, hydrocodone, and Toradol at discharge, which she has been compliant with. She has been experiencing difficulty urinating, noting that she could not urinate at all last night but was able to void a small amount this morning. Pt is taking Lovenox. Symptoms are constant and moderate in severity. No mitigating or exacerbating factors reported. Associated sxs include nausea, dizziness, generalized abdominal pain. Patient denies any fever, chills, vomiting, diarrhea, blood in stool, dysuria, weakness, numbness, and all other sxs at this time. No further complaints or concerns at this time.       Arrival mode: Personal vehicle    PCP: Primary Doctor No        Past Medical History:  No past medical history on file.    Past Surgical History:  No past surgical history on file.      Family History:  No family history on file.    Social History:  Social History     Tobacco Use    Smoking status: Not on file    Smokeless  tobacco: Not on file   Substance and Sexual Activity    Alcohol use: Not on file    Drug use: Not on file    Sexual activity: Not on file        Review of Systems     Review of Systems   Constitutional: Negative for chills and fever.   HENT: Negative for congestion and sore throat.    Respiratory: Negative for cough and shortness of breath.    Cardiovascular: Negative for chest pain.   Gastrointestinal: Positive for abdominal pain (generalized) and nausea. Negative for diarrhea and vomiting.   Genitourinary: Positive for difficulty urinating, flank pain (left) and hematuria. Negative for dysuria.   Musculoskeletal: Negative for back pain.   Skin: Negative for rash.   Neurological: Negative for dizziness, weakness and numbness.   Hematological: Does not bruise/bleed easily.   All other systems reviewed and are negative.     Physical Exam     Initial Vitals [02/24/22 0906]   BP Pulse Resp Temp SpO2   98/68 82 18 97.9 °F (36.6 °C) 97 %      MAP       --          Physical Exam  Nursing Notes and Vital Signs Reviewed.  Constitutional: Patient is in no acute distress. Well-developed and well-nourished.  Head: Atraumatic. Normocephalic.  Eyes: EOM intact. Conjunctivae are not pale. No scleral icterus.  ENT: Mucous membranes are moist. Oropharynx is clear and symmetric.    Neck: Supple. Full ROM. No lymphadenopathy.  Cardiovascular: Regular rate. Regular rhythm. No murmurs, rubs, or gallops. Distal pulses are 2+ and symmetric.  Pulmonary/Chest: No respiratory distress. Clear to auscultation bilaterally. No wheezing or rales.  Abdominal: Soft and non-distended.  There is no tenderness.  No rebound, guarding, or rigidity.  Genitourinary: Left CVA tenderness. Gross hematuria.  Musculoskeletal: Moves all extremities. No obvious deformities. No edema.  Skin: Warm and dry.  Neurological:  Alert, awake, and appropriate.  Normal speech.  No acute focal neurological deficits are appreciated.  Psychiatric: Normal affect. Good eye  contact. Appropriate in content.     ED Course   Procedures  ED Vital Signs:  Vitals:    02/24/22 0906 02/24/22 1035 02/24/22 1102 02/24/22 1147   BP: 98/68  (!) 93/51 (!) 86/53   Pulse: 82  (!) 50 (!) 50   Resp: 18 20 13 16   Temp: 97.9 °F (36.6 °C)      TempSrc: Oral      SpO2: 97%   100%   Weight: 96.6 kg (213 lb 1.2 oz)       02/24/22 1205 02/24/22 1253 02/24/22 1302 02/24/22 1342   BP: (!) 84/42 (!) 107/50 (!) 101/54 (!) 88/50   Pulse: (!) 51 (!) 53 (!) 55 66   Resp: 20 14 14 (!) 39   Temp:       TempSrc:       SpO2:       Weight:        02/24/22 1402 02/24/22 1432 02/24/22 1502 02/24/22 1532   BP: (!) 86/49 (!) 90/50 (!) 90/50 (!) 92/55   Pulse: (!) 52 (!) 50 (!) 50 (!) 49   Resp: 13 18 11 11   Temp:       TempSrc:       SpO2:       Weight:        02/24/22 1609 02/24/22 1701 02/24/22 1707   BP: (!) 106/57  (!) 105/55   Pulse: (!) 58  (!) 54   Resp:  18 18   Temp:      TempSrc:      SpO2:   99%   Weight:        Abnormal Lab Results:  Labs Reviewed   COMPREHENSIVE METABOLIC PANEL - Abnormal; Notable for the following components:       Result Value    CO2 30 (*)     Calcium 8.5 (*)     AST 47 (*)     Anion Gap 6 (*)     All other components within normal limits   CBC W/ AUTO DIFFERENTIAL - Abnormal; Notable for the following components:    RBC 3.76 (*)     Hemoglobin 8.3 (*)     Hematocrit 29.0 (*)     MCV 77 (*)     MCH 22.1 (*)     MCHC 28.6 (*)     RDW 21.3 (*)     All other components within normal limits   URINALYSIS, REFLEX TO URINE CULTURE - Abnormal; Notable for the following components:    Occult Blood UA 3+ (*)     Leukocytes, UA 1+ (*)     All other components within normal limits    Narrative:     Specimen Source->Urine   URINALYSIS MICROSCOPIC - Abnormal; Notable for the following components:    RBC, UA >100 (*)     All other components within normal limits    Narrative:     Specimen Source->Urine   SARS-COV-2 RDRP GENE - Normal    Narrative:     This test utilizes isothermal nucleic acid  amplification   technology to detect the SARS-CoV-2 RdRp nucleic acid segment.   The analytical sensitivity (limit of detection) is 125 genome   equivalents/mL.   A POSITIVE result implies infection with the SARS-CoV-2 virus;   the patient is presumed to be contagious.     A NEGATIVE result means that SARS-CoV-2 nucleic acids are not   present above the limit of detection. A NEGATIVE result should be   treated as presumptive. It does not rule out the possibility of   COVID-19 and should not be the sole basis for treatment decisions.   If COVID-19 is strongly suspected based on clinical and exposure   history, re-testing using an alternate molecular assay should be   considered.   This test is only for use under the Food and Drug   Administration s Emergency Use Authorization (EUA).   Commercial kits are provided by [employer].   Performance characteristics of the EUA have been independently   verified by [facility] Department of   Pathology and Laboratory Medicine.   _________________________________________________________________   The authorized Fact Sheet for Healthcare Providers and the authorized Fact   Sheet for Patients of the ID NOW COVID-19 are available on the FDA   website:     https://www.fda.gov/media/092980/download  https://www.fda.gov/media/982295/download           PROTIME-INR   APTT   LACTIC ACID, PLASMA      All Lab Results:  Results for orders placed or performed during the hospital encounter of 02/24/22   Comprehensive metabolic panel   Result Value Ref Range    Sodium 143 136 - 145 mmol/L    Potassium 3.9 3.5 - 5.1 mmol/L    Chloride 107 95 - 110 mmol/L    CO2 30 (H) 23 - 29 mmol/L    Glucose 82 70 - 110 mg/dL    BUN 16 6 - 20 mg/dL    Creatinine 0.9 0.5 - 1.4 mg/dL    Calcium 8.5 (L) 8.7 - 10.5 mg/dL    Total Protein 6.3 6.0 - 8.4 g/dL    Albumin 3.7 3.5 - 5.2 g/dL    Total Bilirubin 0.3 0.1 - 1.0 mg/dL    Alkaline Phosphatase 105 55 - 135 U/L    AST 47 (H) 10 - 40 U/L     ALT 25 10 - 44 U/L    Anion Gap 6 (L) 8 - 16 mmol/L    eGFR if African American >60 >60 mL/min/1.73 m^2    eGFR if non African American >60 >60 mL/min/1.73 m^2   CBC auto differential   Result Value Ref Range    WBC 6.43 3.90 - 12.70 K/uL    RBC 3.76 (L) 4.00 - 5.40 M/uL    Hemoglobin 8.3 (L) 12.0 - 16.0 g/dL    Hematocrit 29.0 (L) 37.0 - 48.5 %    MCV 77 (L) 82 - 98 fL    MCH 22.1 (L) 27.0 - 31.0 pg    MCHC 28.6 (L) 32.0 - 36.0 g/dL    RDW 21.3 (H) 11.5 - 14.5 %    Platelets 210 150 - 450 K/uL    MPV 9.9 9.2 - 12.9 fL    Immature Granulocytes 0.2 0.0 - 0.5 %    Gran # (ANC) 3.6 1.8 - 7.7 K/uL    Immature Grans (Abs) 0.01 0.00 - 0.04 K/uL    Lymph # 2.2 1.0 - 4.8 K/uL    Mono # 0.5 0.3 - 1.0 K/uL    Eos # 0.1 0.0 - 0.5 K/uL    Baso # 0.03 0.00 - 0.20 K/uL    nRBC 0 0 /100 WBC    Gran % 55.7 38.0 - 73.0 %    Lymph % 34.5 18.0 - 48.0 %    Mono % 7.9 4.0 - 15.0 %    Eosinophil % 1.2 0.0 - 8.0 %    Basophil % 0.5 0.0 - 1.9 %    Differential Method Automated    Urinalysis, Reflex to Urine Culture Urine, Clean Catch    Specimen: Urine   Result Value Ref Range    Specimen UA Urine, Clean Catch     Color, UA Yellow Yellow, Straw, Ronna    Appearance, UA Clear Clear    pH, UA 6.0 5.0 - 8.0    Specific Gravity, UA 1.010 1.005 - 1.030    Protein, UA Negative Negative    Glucose, UA Negative Negative    Ketones, UA Negative Negative    Bilirubin (UA) Negative Negative    Occult Blood UA 3+ (A) Negative    Nitrite, UA Negative Negative    Urobilinogen, UA Negative <2.0 EU/dL    Leukocytes, UA 1+ (A) Negative   Protime-INR   Result Value Ref Range    Prothrombin Time 12.3 9.0 - 12.5 sec    INR 1.1 0.8 - 1.2   APTT   Result Value Ref Range    aPTT 27.6 21.0 - 32.0 sec   Urinalysis Microscopic   Result Value Ref Range    RBC, UA >100 (H) 0 - 4 /hpf    WBC, UA 5 0 - 5 /hpf    Bacteria Rare None-Occ /hpf    Squam Epithel, UA 2 /hpf    Microscopic Comment SEE COMMENT    Lactic acid, plasma   Result Value Ref Range    Lactate (Lactic  Acid) 0.7 0.5 - 2.2 mmol/L   POCT COVID-19 Rapid Screening   Result Value Ref Range    POC Rapid COVID Negative Negative     Acceptable Yes      Imaging Results:  Imaging Results          X-Ray Abdomen AP 1 View (KUB) (Final result)  Result time 02/24/22 11:09:22    Final result by Aaron Isabel MD (02/24/22 11:09:22)                 Impression:      1.  Negative for acute process.    2.  Moderately increased stool burden.    3.  Incidental findings as noted above.      Electronically signed by: Aaron Isabel MD  Date:    02/24/2022  Time:    11:09             Narrative:    EXAMINATION:  XR ABDOMEN AP 1 VIEW    CLINICAL HISTORY:  Unspecified abdominal pain    TECHNIQUE:  AP View(s) of the abdomen was performed.    COMPARISON:  None    FINDINGS:  Moderate stool throughout the colon.  Bowel gas pattern is otherwise normal without evidence for free air.    A left ureteral stent is in place.  Bilateral iliac vein and distal IVC wall stents in place.  Cholecystectomy clips.  Postoperative changes at the GE junction.    Negative for osseous abnormalities.  There are phleboliths versus ureteroliths some overlying the pelvis.  Lung bases are clear.                                      The Emergency Provider reviewed the vital signs and test results, which are outlined above.     ED Discussion     4:47 PM: Reassessed pt at this time.Discussed with pt all pertinent ED information and results. Patient has records on her phone that show her last hemoglobin was 9.3. Discussed pt dx and plan of tx. Gave pt all f/u and return to the ED instructions. All questions and concerns were addressed at this time. Pt expresses understanding of information and instructions, and is comfortable with plan to discharge. Pt is stable for discharge.    I discussed with patient and/or family/caretaker that evaluation in the ED does not suggest any emergent or life threatening medical conditions requiring immediate intervention  beyond what was provided in the ED, and I believe patient is safe for discharge.  Regardless, an unremarkable evaluation in the ED does not preclude the development or presence of a serious of life threatening condition. As such, patient was instructed to return immediately for any worsening or change in current symptoms.      ED Course as of 03/02/22 1731   u Feb 24, 2022   1147 Bladder scan 30 ml [DP]      ED Course User Index  [DP] Richard Cuadra MD     Medical Decision Making:   Clinical Tests:   Lab Tests: Ordered and Reviewed  Radiological Study: Ordered and Reviewed           ED Medication(s):  Medications   ondansetron injection 4 mg (4 mg Intravenous Given 2/24/22 1036)   morphine injection 4 mg (4 mg Intravenous Given 2/24/22 1035)   sodium chloride 0.9% bolus 1,000 mL (0 mLs Intravenous Stopped 2/24/22 1508)   sodium chloride 0.9% bolus 1,000 mL (0 mLs Intravenous Stopped 2/24/22 1508)   HYDROcodone-acetaminophen 5-325 mg per tablet 1 tablet (1 tablet Oral Given 2/24/22 1701)       Discharge Medication List as of 2/24/2022  4:46 PM           Follow-up Information     [facility] - Emergency Dept..    Specialty: Emergency Medicine  Why: As needed, If symptoms worsen  Contact information:  [address] [place] [id number] [phone]           Follow-up with your urologist.                           Scribe Attestation:   Scribe #1: I performed the above scribed service and the documentation accurately describes the services I performed. I attest to the accuracy of the note.     Attending:   Physician Attestation Statement for Scribe #1: I, Richard Cuadra MD, personally performed the services described in this documentation, as scribed by Miguel A Giron, in my presence, and it is both accurate and complete.           Clinical Impression       ICD-10-CM ICD-9-CM   1. Nephrolithiasis  N20.0 592.0   2. Left flank pain  R10.9 789.09       Disposition:   Disposition:  Discharged  Condition: Stable         Richard Cuadra MD  03/02/22 8683

## 2022-03-16 ENCOUNTER — HISTORICAL (OUTPATIENT)
Dept: ADMINISTRATIVE | Facility: HOSPITAL | Age: 52
End: 2022-03-16

## 2022-04-09 ENCOUNTER — HISTORICAL (OUTPATIENT)
Dept: ADMINISTRATIVE | Facility: HOSPITAL | Age: 52
End: 2022-04-09
Payer: COMMERCIAL

## 2022-04-29 VITALS
DIASTOLIC BLOOD PRESSURE: 70 MMHG | HEIGHT: 67 IN | DIASTOLIC BLOOD PRESSURE: 81 MMHG | SYSTOLIC BLOOD PRESSURE: 150 MMHG | SYSTOLIC BLOOD PRESSURE: 104 MMHG | DIASTOLIC BLOOD PRESSURE: 90 MMHG | DIASTOLIC BLOOD PRESSURE: 65 MMHG | HEIGHT: 67 IN | HEIGHT: 67 IN | WEIGHT: 233.69 LBS | HEIGHT: 67 IN | OXYGEN SATURATION: 98 % | HEIGHT: 67 IN | SYSTOLIC BLOOD PRESSURE: 114 MMHG | WEIGHT: 233.69 LBS | BODY MASS INDEX: 36.68 KG/M2 | DIASTOLIC BLOOD PRESSURE: 80 MMHG | WEIGHT: 207.25 LBS | SYSTOLIC BLOOD PRESSURE: 128 MMHG | OXYGEN SATURATION: 99 % | OXYGEN SATURATION: 100 % | SYSTOLIC BLOOD PRESSURE: 103 MMHG | DIASTOLIC BLOOD PRESSURE: 81 MMHG | OXYGEN SATURATION: 100 % | SYSTOLIC BLOOD PRESSURE: 121 MMHG | BODY MASS INDEX: 36.68 KG/M2 | WEIGHT: 233.69 LBS | SYSTOLIC BLOOD PRESSURE: 116 MMHG | BODY MASS INDEX: 31.15 KG/M2 | DIASTOLIC BLOOD PRESSURE: 82 MMHG | OXYGEN SATURATION: 96 % | BODY MASS INDEX: 32.53 KG/M2 | OXYGEN SATURATION: 97 % | WEIGHT: 198.44 LBS | HEIGHT: 67 IN | BODY MASS INDEX: 36.68 KG/M2 | BODY MASS INDEX: 35.98 KG/M2 | OXYGEN SATURATION: 100 % | WEIGHT: 229.25 LBS

## 2022-04-30 NOTE — OP NOTE
Patient:   Holly Starr            MRN: [id number]            FIN: [id number]               Age:   47 years     Sex:  Female     :  1970   Associated Diagnoses:   Vein compression; Venous hypertension; Venous intermittent claudication; Leg edema   Author:   Chintan Johnson DO      Brief Operative Note   Operative Information   Date/ Time:  2018 17:14:00.     Preoperative Diagnosis: Vein compression (ILW05-EI I87.1), Venous hypertension (JHN67-XZ I87.309), Venous intermittent claudication (THG58-RN I87.8), Leg edema (MVV65-DH R60.0).     Postoperative Diagnosis: Vein compression (MMF29-ZY I87.1), Venous hypertension (ARE76-GD I87.309), Venous intermittent claudication (OSG07-CF I87.8), Leg edema (USX51-UU R60.0).     Procedures Performed: B/L LE Venogram  w PTV/Stenting of the b/l iliac veins. .     Surgeon: Chintan Johnson DO     Esimated blood loss: No blood loss.     Description of Procedure/Findings/    Complications: See op report on chart.

## 2022-04-30 NOTE — OP NOTE
Patient:   Holly Starr            MRN: [id number]            FIN: [id number]               Age:   49 years     Sex:  Female     :  1970   Associated Diagnoses:   None   Author:   Mj Roper MD      SURGEON:  Mj Roper MD    PREOPERATIVE DIAGNOSIS(ES):  Left renal calculus.    POSTOPERATIVE DIAGNOSIS(ES):  Left renal calculus.    PROCEDURE(S)/OPERATION(S) PERFORMED:  1. Left ureteroscopy with Holmium laser lithotripsy.  2. Left double-J ureteral stent placement.    ANESTHESIA:  General.    FLUIDS ADMINISTERED:  700 mL crystalloid.    ESTIMATED BLOOD LOSS:  3 mL.    SPECIMENS:  Left renal stone sent for stone analysis.    FINDINGS:   The patient was endoscopically and radiographically stone free at the end of the case.    COMPLICATIONS:  None.    DRAINS:  6-Congolese x 24 cm left double-J ureteral stent.    SUMMARY:  After informed consent was obtained, the patient was brought to the operating room and placed  in the supine position. After adequate general anesthetic, the patient was positioned in dorsal  lithotomy and genitalia prepped and draped in a sterile manner. A time-out was performed with  the patient identified using 2 identifiers and the procedure site verified. The 21-Congolese rigid  cystoscope was advanced into the bladder and the bladder was drained. The bladder was  thoroughly examined with 30 and 70-degree lenses. The ureteral orifices were in the normal  position. The indwelling double-J stent was visualized and brought outside the urethral meatus  with a grasper. We advanced an angle-tip Glidewire up the lumen of the stent into the ureter  and renal pelvis under fluoroscopic guidance. We then removed the stent and advanced a 10  Congolese dual lumen catheter over the wire and into the distal ureter under fluoroscopic guidance.  This passed easily and contrast was injected into the dual lumen catheter to opacify the ureter  and renal pelvis. There were no strictures or ureteral  filling defects. A second PTFE wire was  placed through the dual lumen catheter up to the renal pelvis under fluroscopic guidance. The  dual lumen catheter was removed and the Glidewire was secured to the drapes as a safety  wire. Next we passed a 12/14 Swazi Cook Flexor ureteral access sheath over the working wire  and into the ureter under fluoroscopic guidance. It passed easily to the level of the mid ureter. The  inner dilator and working wire were then removed.    We then advanced a LithoVue flexible ureteroscope into the access sheath and examined  the ureter, renal pelvis, and calyces. We visualized stone in the lower pole. We used a 273 micron  Holmium laser fiber to fragment the stone with settings of 1.0 Joules and 10 pulses per second.    The stone was fragmented into fragments small enough to remove through the access sheath. The fragments were then removed with a with a  2.2cm Swazi Unmetric ncircle stone basket and removed. Once all fragments had been removed, we  examined all the calyces and renal pelvis again twice and no residual fragments were seen  endoscopically or radiographically. We injected 5 mL of contrast to opacify the renal pelvis, and  then removed the ureteroscope and access sheath. The ureter was carefully examined as we  removed the scope and there was no evidence of injury, perforation, or retained calculus.      We elected to leave an indwelling double-J stent to prevent obstruction due to edema or spasm.  The cystoscope was replaced into the bladder and the 6-Swazi open-ended ureteral catheter  and Glidewire were advanced back into the ureter. We measured the ureter from the UPJ to the  UO and it measured 24 cm. We then deployed a double-J stent in a standard fashion with the  proximal curl in the renal pelvis and the distal curl in the bladder. We left an external stent  tether. The bladder was drained with the cystoscope and the cystoscope was removed. The  patient tolerated the  procedure well, was extubated, and transported to the recovery room in  stable condition. The family was informed of the outcome following the procedure.

## 2022-04-30 NOTE — CONSULTS
DATE OF CONSULTATION:  08/01/2018    ATTENDING PHYSICIAN:  Chintan Johnson DO  CONSULTING PHYSICIAN:  Dacia Zendejas MD    REASON FOR CONSULTATION:  Infectious Disease evaluation for an abnormal transesophageal echocardiogram showing what appears to be chronic-appearing vegetation on tip of mitral valve.    HISTORY OF PRESENT ILLNESS:  This is a 47-year-old female who was admitted to [facility] on 07/31/2018 for routine invasive cardiovascular studies.  The patient gives a history of congenital bicuspid aortic valve.  She tells me she has had 4 valvular surgeries, initially with 3 repairs, and finally had an aortic valve replacement with a mechanical valve in 2008 in [place].  She was not having any significant symptoms but routinely had undergo stress test which was found to be abnormal.  She was then admitted to the hospital for a transesophageal echocardiogram as well as a cardiac catheterization.  The report of the transesophageal echocardiogram is indicative of a chronic-appearing vegetation on the tip of the mitral valve.  The patient denies any fevers.  No chills.  No sweats.  No malaise.  Stay states she had been in her normal state of health prior to admission for the routine testing.  Denies any acute shortness of breath.  Denies any chest pain.  She is without any fevers in the hospital, and no leukocytosis.    PAST MEDICAL HISTORY:  Hyperlipidemia, hypertension, gastroesophageal reflux disease, morbid obesity, bicuspid aortic valve, insomnia, hemorrhoids.    PAST SURGICAL HISTORY:  Aortic valve surgeries x4 as reported above with a replacement in 2008, gastric bypass, hysterectomy, foot surgery, cholecystectomy.    SOCIAL HISTORY:  Denies any alcohol, tobacco, or illicit drug abuse.    ALLERGIES:  Prednisone.    MEDICATIONS:  Reviewed.    REVIEW OF SYSTEMS:  Twelve-point system review done, negative other than as stated above.    PHYSICAL EXAMINATION:  VITAL SIGNS:   T-max of 37.1, blood pressure 112/68.  Pulse is 53.  Respiratory rate 16.   GENERAL APPEARANCE:  This is an obese middle-aged female who is awake, alert, oriented x4, in no acute cardiopulmonary distress.  She does not appear to be toxic or septic.   HEENT:  Normocephalic.  Pupils reactive.  Extraocular muscles intact.  Oropharynx, no lesions.     NECK:  Supple.  No jugular venous distention.     CHEST:  Clear to auscultation.  No wheezes.  No crackles.  No rhonchi   CARDIOVASCULAR:  S1, S2.  Regular rate and rhythm.  No gallops.   ABDOMEN:  Obese, nontender.  Bowel sounds positive.  No organomegaly.  Has midline scars from prior surgery.     :  No lesions reported.  No suprapubic tenderness.  No costovertebral angle tenderness.   EXTREMITIES:  No edema.  No clubbing.  No cyanosis.   NEURO:  Cranial nerves 2-12 grossly intact.  No focal sensory or motor deficits.   PSYCH:  Mentation and affect are appropriate.   SKIN:  There is no rash.    LABS:  From today, sodium 142, potassium 4.5, chloride 110, bicarbonate 27, glucose 84, BUN 9, creatinine 0.69.  Hematology:  WBC 2.1, hemoglobin 10.9, hematocrit 35.6, platelets 203, neutrophils 45%.  Sed rate is 60.  C-reactive protein is 1.26.    IMPRESSION:    1. Abnormal transesophageal echocardiogram with abnormal mitral valve suggestive of a chronic vegetation.  2. Prosthetic aortic valve.   3. Anemia.   4. Morbid obesity.    RECOMMENDATIONS:  I agree with the management of this patient.  This patient does not have any clinical signs and symptoms of infection.  Apparently, she was admitted for routine invasive cardiovascular studies.  I do not think she has any infectious process.  The abnormality found on the mitral valve is not likely to be related to infection, especially since she does have a C-reactive protein within normal limits, 1.26, and that is a high-sensitive C-reactive protein.  She does, however, have elevated sed rate at 60.  I will get routine baseline  blood cultures.  Overall prognosis is guarded.  Antibiotics not indicated.  Case was discussed with Nursing Staff at length.         I would like to thank Dr. Chintan Johnson' team very much for the opportunity to assist in the care of this patient.        ______________________________  MD ARMANDO Nickerson/PIYUSH  DD:  08/01/2018  Time:  06:55PM  DT:  08/01/2018  Time:  08:24PM  Job #:  [id number]

## 2022-04-30 NOTE — DISCHARGE SUMMARY
DISCHARGE DATE:  08/02/2018    HOSPITAL COURSE:  Ms Starr was admitted for evaluation and management of valvular heart disease and some suspicious cardiovascular symptoms and also based on results of the noninvasive cardiovascular evaluation.  As outlined in the operative report, she underwent right and left heart catheterizations as well as bilateral lower extremity venography with intravascular ultrasonography, and she tolerated the procedure well.  She also underwent transesophageal echocardiography and essentially her mechanical prosthetic aortic valve appears stable and functioning normally.  She appeared to have a functionable chronic-appearing vegetation involving the tips of the tricuspid valve leaflets.  Left ventricular systolic function was found to be preserved.  She was admitted and seen and evaluated by Infectious Disease for possible endocarditis, though this may have been a chronic lesion.  Infectious disease evaluation essentially found that with the patient having normal high sensitivity C-reactive protein and essentially unremarkable erythrocyte sedimentation rate, it was felt that there were no signs of endocarditis and further therapy or antibiotics were not necessary.  Today, the patient is doing well.  She is awake, alert.  She is comfortable, tolerating medical therapies rather well.  She has been resumed on anticoagulation for her mechanical prosthetic aortic valve.  Vital signs have been stable, as well as her physical examination.    LABORATORY DATA:  Pending.    DISCHARGE DIAGNOSES:    1. Mild nonobstructive coronary artery disease.  2. Preserved left ventricular systolic ejection fraction based on the transesophageal echocardiography report.  3. Stable and well-seated mechanical prosthetic mitral valve with evidence of probable moderate transvalvular aortic stenosis with a peak aortic valve gradient in the range of 35 mmHg and a mean aortic valve gradient in the range of 20  mmHg.  4. Moderately severe pulmonary hypertension with a pulmonary artery pressure of 53/31 and mean pressure of 42 mmHg and elevated pulmonary capillary wedge pressure of 36 mmHg.  5. Severe ostial left common iliac vein compression and moderate right iliac vein compression based on venography and intravascular ultrasonography.  6. Mild mitral regurgitation and mild tricuspid regurgitation with some sclerosis of the tricuspid valve leaflets.  7. Pulmonary hypertension.  8. Peripheral edema.  9. Chronic venous hypertension.  10. Venous intermittent claudication.    RECOMMENDATIONS:  Ms Starr will be arranged for discharge home today.  She will continue basically on the same medications as on admission.  She has been restarted on her Coumadin for a mechanical prosthetic mitral valve, bridging this with Xarelto, realizing this is an off-label indication for the anticoagulation therapy.  Will also follow up on the blood cultures per ID to confirm no evidence of tricuspid valve endocarditis.  I have consulted with the pulmonary team regarding the pulmonary hypertension.  Urge her to follow up with the pulmonary team regarding the pulmonary hypertension.  We will discuss conservative approach versus possible simultaneous bilateral iliac vein percutaneous transluminal venoplasties in order to adequately treat the ostial left common iliac vein compression lesion.  She will otherwise continue on the same medications as on admission.  We will continue to monitor the valvular heart disease.  Further recommendations forthcoming.        ______________________________  Chintan Johnson DO    CT/UB  DD:  08/02/2018  Time:  11:09AM  DT:  08/02/2018  Time:  11:31AM  Job #:  [id number]

## 2022-04-30 NOTE — DISCHARGE SUMMARY
DISCHARGE DATE:  08/31/2018    HOSPITAL COURSE:  Ms Starr underwent elective bilateral iliac vein percutaneous transluminal venoplasty/stenting to treat iliac vein compression syndrome.  She tolerated the procedure well and postop course has been unremarkable allowing for discharge home.  The specifics are outlined in the operative report.  Discharge laboratory data stable.    DISCHARGE DIAGNOSES:    1. Iliac vein compression syndrome.  2. Status post simultaneous bilateral iliac vein percutaneous transluminal venoplasty/stenting.  3. Peripheral edema.  4. Venous hypertension.  5. Intermittent claudication.  6. Mechanical prosthetic aortic valve replacement.  7. Hypertension.  8. Dyslipidemia.    RECOMMENDATIONS:  Ms Starr will be arranged for discharge home.  She remains in stable condition.  She will continue on the same medications as on admission, which includes Coumadin for her mechanical prosthetic aortic valve.  In addition, she will be started on Plavix 75 mg daily for the iliac vein stents.  She will maintain her anticoagulation for her valve with subcutaneous Lovenox as discussed.  She will otherwise continue on the same medications as on admission and I will be checking on her progress in my office in the very near future.  We will continue to monitor her progress closely and titrate medical therapies as tolerated.  Further recommendations forthcoming.        ______________________________  DO KEYONNA Nix/  DD:  09/01/2018  Time:  03:06PM  DT:  09/01/2018  Time:  03:20PM  Job #:  [id number]

## 2022-04-30 NOTE — H&P
Patient:   Holly Starr            MRN: [id number]            FIN: [id number]               Age:   50 years     Sex:  Female     :  1970   Associated Diagnoses:   None   Author:   Cas Tyler MD      Basic Information   Source of history   Present at bedside   Referral source      Chief Complaint   10/20/2020 21:47 CDT     Chief Complaint        History of Present Illness        The patient presents with 50-year-old female was transferred to [facility] emergency room secondary to vaginal bleeding.  Patient had sexual intercourse for the 1st time in several months tonight, which was followed immediately by heavy bright red vaginal bleeding.  Patient has continued to have vaginal bleeding in spite of vaginal packing in the emergency room.  A noticeable decrease in the patient's hemoglobin and hematocrit has been occurring over the past several hours.  The patient does report some vaginal pain.    Patient is status post a total abdominal hysterectomy bilateral salpingo-oophorectomy done in ., Evaluation for laceration closure was made in the examination room.  Due to the high position of the laceration in the vagina and the patient's discomfort level she will require laceration closure in the operating room.  Alternatives, risks, and complications have been discussed the patient.  Questions were answered to her satisfaction.  Operative consents have been signed..     Review of Systems   Obstetric   Gynecologic   Neurologic   ROS reviewed as documented in chart      Health Status   Allergies:    Allergic Reactions (All)  Severity Not Documented  Niacin- Tachycardia.  Remeron- Nerves on fire.  Canceled/Inactive Reactions (All)  Severity Not Documented  PredniSONE- Heart racing.,    Allergies (2) Active Reaction  niacin Tachycardia  Remeron nerves on fire     Current medications:  (Selected)   Prescriptions  Prescribed  DuoNeb 0.5 mg-2.5 mg/3 mL inhalation solution: 3 mL, INH, QID, PRN PRN as  needed for shortness of breath or wheezing, # 30 EA, 11 Refill(s), [id number], 170, cm, Height/Length Dosing, 07/06/20 13:10:00 CDT, 103, kg, Weight Dosing, 07/06/20 13:10:00 CDT  Flovent  mcg/inh inhalation aerosol: 2 puff(s), INH, BID, # 12 gm, 0 Refill(s), [id number], 170, cm, Height/Length Dosing, 05/13/20 13:45:00 CDT, 104, kg, Weight Dosing, 05/13/20 13:45:00 CDT  Lunesta 3 mg oral tablet: 3 mg = 1 tab(s), Oral, Once a day (at bedtime), PRN PRN for insomnia, do not chew or break tablets, X 30 day(s), # 30 tab(s), 2 Refill(s), [id number], 170, cm, Height/Length Dosing, 10/06/20 15:27:00 CDT, 104.7, kg, Weight Dosing, 10...  QUEtiapine 400 mg oral tablet: 400 mg = 1 tab(s), Oral, At Bedtime, # 30 tab(s), 11 Refill(s), [id number], 170, cm, Height/Length Dosing, 07/06/20 13:10:00 CDT, 103, kg, Weight Dosing, 07/06/20 13:10:00 CDT  cetirizine 10 mg oral tablet: See Instructions, TAKE 1 TABLET BY MOUTH EVERY DAY, # 30 tab(s), 11 Refill(s), eRx: [id number]  nebulizer: nebulizer, See Instructions, please dispense one nebulizer and tubing. dx j40, # 1 units, 0 Refill(s)  rosuvastatin 20 mg oral tablet: See Instructions, TAKE 1 TABLET BY MOUTH EVERYDAY AT BEDTIME, # 30 tab(s), 11 Refill(s), Pharmacy: [facility] [id number], 170, cm, Height/Length Dosing, 07/06/20 13:10:00 CDT, 103, kg, Weight Dosing, 07/06/20 13:10:00 CDT  sertraline 100 mg oral tablet: 150 mg = 1.5 tab(s), Oral, qPM, # 45 tab(s), 11 Refill(s), Pharmacy: [id number], 170, cm, Height/Length Dosing, 07/06/20 13:10:00 CDT, 103, kg, Weight Dosing, 07/06/20 13:10:00 CDT  sucralfate 1 g oral tablet: See Instructions, TAKE 1 TABLET BY MOUTH 4 TIMES A DAY BEFORE MEALS AT BEDTIME ON AN EMPTY STOMACH, # 120 tab(s), 1 Refill(s), Pharmacy: [facility] STORE [id number], 170, cm, Height/Length Dosing, 03/17/20 13:03:00 CDT, 106, kg, Weight Dosing, 03/17/20 13:03:00 CDT...  Documented  Medications  Documented  LORAZEPAM 0.5 MG TABLET: 0.5 mg = 1 tab(s), Oral, TID, PRN PRN anxiety  metoprolol succinate 25 mg oral tablet, extended release: 25 mg = 1 tab(s), Oral, qPM  warfarin 2 mg oral tablet: 2 mg = 1 tab(s), Oral, Daily, # 30 tab(s), 0 Refill(s)   Problem list:    All Problems  Anticoagulated on Coumadin / SNOMED CT 63203747 / Confirmed  Anxiety / SNOMED CT [id number] / Confirmed  Depression / SNOMED CT [id number] / Confirmed  GERD - Gastro-esophageal reflux disease / SNOMED CT 5091339449 / Confirmed  Mechanical heart valve present / SNOMED CT 3525085347 / Confirmed  HLD - Hyperlipidemia / SNOMED CT 717263471 / Confirmed  Insomnia / SNOMED CT [id number] / Confirmed  Kidney stone / SNOMED CT 766692991 / Confirmed  Obesity / SNOMED CT 4395049907 / Probable  PAD - Peripheral arterial disease / SNOMED CT 860540919606744 / Confirmed  Psoriatic arthritis / SNOMED CT 999080220 / Confirmed  Pulmonary hypertension / SNOMED CT 119367056 / Confirmed  Urge incontinence of urine / SNOMED CT 310022016 / Confirmed  Resolved: Acute sinusitis / SNOMED CT 25793903  Resolved: Anxiety / SNOMED CT [id number]  Resolved: CAD - Coronary artery disease / SNOMED CT 6581266411  Resolved: Depression / SNOMED CT 241649906  Resolved: Gallbladder disease / SNOMED CT 720347069  Resolved: GERD - Gastro-esophageal reflux disease / SNOMED CT 8000185930  Resolved: HTN - Hypertension / SNOMED CT 6088338828  Resolved: Insomnia / SNOMED CT 231589675  Resolved: Kidney infection / SNOMED CT 830615473  Resolved: Mechanical heart valve replacement / SNOMED CT 4391515671  2008  aortic valve  Resolved: Obesity / SNOMED CT 2308032758  Resolved: Panic attack / SNOMED CT 787480465  Resolved: Restless legs syndrome / SNOMED CT 47419693  [id number]  Canceled: Hypertension /  [facility] CT 79096241      Histories   Past Medical History:    Active  HLD - Hyperlipidemia (723161533)  Pulmonary hypertension (653969906)  PAD - Peripheral arterial disease (597273732911433)  Resolved  HTN - Hypertension (0029984961):  Resolved.  Anxiety ([id number]):  Resolved.  Depression (655686798):  Resolved.  Acute sinusitis (72743477):  Resolved.  Gallbladder disease (018184802):  Resolved.  Restless legs syndrome (55028653):  Resolved.  Panic attack (408244510):  Resolved.  Mechanical heart valve replacement (9212118013):  Resolved.  Comments:  2018 CDT 16:01 PORSCHE Shannon RN, Ning Grant  2008    2018 CDT 16:01 PORSCHE Shannon RN, Ning Grant  aortic valve  GERD - Gastro-esophageal reflux disease (1200705960):  Resolved.  CAD - Coronary artery disease (8462688259):  Resolved.  Obesity (7545255100):  Resolved.  Insomnia (219421978):  Resolved.  Kidney infection (990824104):  Resolved.   Family History:    Breast cancer.  Mother ()  Bladder cancer.  Father     Procedure history:    Cystoscopy w/Ureteroscopic Stone Extract (.) on 11/15/2019 at 49 Years.  Comments:  2019 10:32 Christelle Eng LPN  auto-populated from documented surgical case  Colonoscopy (619573723) on 2019 at 48 Years.  Comments:  2019 10:49 Vaishnavi Mak LPN  [facility]  Mammogram (227051021) on 2019 at 48 Years.  Comments:  2019 15:07 Vaishnavi Mak LPN  [facility]  Total hysterectomy (118918514) on 2019 at 48 Years.  Comments:  2019 16:31 TEE Mesa MD, Priti MONZON  pt had complete hyst  Left Heart Catheterization on 2018 at 47 Years.  Venogram on 2018 at 47 Years.  AGUILAR on 2018 at 47 Years.  Bone density scan (855833432) on 2018 at 47 Years.  Cholecystectomy Laparoscopic (.) on 2018 at 47 Years.  Comments:  2018 10:33 AISHAT - Peyton HOLLOWAY, Melo  auto-populated from documented  surgical case  Esophagogastroduodenoscopy (693724204) on 4/6/2018 at 47 Years.  Foot repair (934038472) in 2015 at 44 Years.  Colonoscopy (657564887) on 8/31/2011 at 40 Years.  Gastric bypass operation (28191362) on 10/1/2010 at 39 Years.  Comments:  9/26/2018 15:28 CDT - Kayla Hanson RN  2010  Mammogram (662480776) on 6/29/1978 at 7 Years.  Comments:  7/3/2018 15:13 CDT - Vaishnavi Lopez LPN  [facility]  Laparoscopy (975197183).  Tonsillectomy (880728013).  FESS - Functional endoscopic sinus surgery (037093379).  mechanical heart valve sx 2008.  Aortic Valve Repair/Replacement x 4.  Comments:  8/31/2018 9:24 CDT - Mervat Tate RN  1970, 1976, 1998, 2008  Cystoscope.  Percutaneous transluminal insertion of peripheral stent into artery (9017001654).   Social History        Social & Psychosocial Habits    Alcohol  07/31/2018  Use: Current    Type: Wine    Frequency: 1-2 times per month    Average drinks per episode in last year: 2    Maximum drinks per episode in last year: 2    Started at age: 17 Years    Previous treatment: None    Has alcohol use interfered with work or home life? No    Do you ever drink more than intended? No    Has anyone been hurt or at risk by your drinking? No    Ready to change: No    Concerns about alcohol use in household: No    Employment/School  09/19/2016  Status: Employed    Exercise  09/19/2016  Duration (average number of minutes): 30    Times per week: 1-2 times/week    Exercise type: Walking    Home/Environment  07/20/2017  Lives with: Children, Spouse    Living situation: Home/Independent    Nutrition/Health  09/19/2016  Type of diet: Regular    Sexual  09/19/2016  Sexually active: Yes    Substance Use  09/19/2016  Use: Never    Tobacco  05/13/2020  Use: Never (less than 100 in l    Patient Wants Consult For Cessation Counseling N/A    05/14/2020  Use: Never (less than 100 in l    Patient Wants Consult For Cessation Counseling No    07/06/2020  Use:  Never (less than 100 in l    Patient Wants Consult For Cessation Counseling No    10/06/2020  Use: Never (less than 100 in l    Patient Wants Consult For Cessation Counseling No    Abuse/Neglect  05/13/2020  SHX Any signs of abuse or neglect No    05/14/2020  SHX Any signs of abuse or neglect No    Feels unsafe at home: No    Safe place to go: Yes    06/25/2020  SHX Any signs of abuse or neglect No    07/06/2020  SHX Any signs of abuse or neglect No    Feels unsafe at home: No    Safe place to go: Yes    10/06/2020  SHX Any signs of abuse or neglect No    Feels unsafe at home: No    Safe place to go: Yes  .        Physical Examination   Vital Signs   10/21/2020 0:23 CDT      Temperature Temporal Artery               36.8 DegC                             Peripheral Pulse Rate     90 bpm                             Respiratory Rate          18 br/min                             SpO2                      99 %                             Oxygen Therapy            Room air                             Systolic Blood Pressure   111 mmHg                             Diastolic Blood Pressure  74 mmHg                             Mean Arterial Pressure, Cuff              86 mmHg    10/20/2020 23:11 CDT     Peripheral Pulse Rate     62 bpm                             SpO2                      100 %                             Oxygen Therapy            Room air                             Systolic Blood Pressure   122 mmHg                             Diastolic Blood Pressure  57 mmHg  LOW    10/20/2020 22:43 CDT     Peripheral Pulse Rate     60 bpm                             Heart Rate Monitored      60 bpm                             SpO2                      99 %                             Oxygen Therapy            Room air                             Systolic Blood Pressure   119 mmHg                             Diastolic Blood Pressure  61 mmHg                             Mean Arterial Pressure, Cuff              80 mmHg     10/20/2020 21:47 CDT     Temperature Oral          36.8 DegC                             Temperature Oral (calculated)             98.24 DegF                             Peripheral Pulse Rate     62 bpm                             Respiratory Rate          20 br/min                             SpO2                      99 %                             Oxygen Therapy            Room air                             Systolic Blood Pressure   107 mmHg                             Diastolic Blood Pressure  64 mmHg        Vital Signs (last 24 hrs)_____  Last Charted___________  Temp Oral     36.8 DegC  (OCT 20 21:47)  Heart Rate Peripheral   90 bpm  (OCT 21 00:23)  Resp Rate         18 br/min  (OCT 21 00:23)  SBP      111 mmHg  (OCT 21 00:23)  DBP      74 mmHg  (OCT 21 00:23)  SpO2      99 %  (OCT 21 00:23)     General:  Alert and oriented, Mild distress.    Respiratory:  Lungs are clear to auscultation, Respirations are non-labored.    Cardiovascular:  Normal rate, Regular rhythm.    Gynecology:          Labia: Atrophied.         Vagina: Bleeding.         Vaginal bleeding: Moderate amount, Blood clots present, Evaluation of the vagina reveals a 3 cm laceration along the right vaginal wall in the area of the vaginal cuff.  The lower part of this laceration is actively bleeding.  Vagina has been repacked to decrease bleeding until patient can be taken to surgery..         Cervix: Absent.         Uterus: Absent.         Ovaries: Surgically absent.    Neurologic:  Alert, Oriented, Normal sensory.       Impression and Plan   Diagnosis     Vaginal laceration with active bleeding..     Course:  Unchanged, Patient will require surgical closure of laceration..

## 2022-04-30 NOTE — OP NOTE
Patient:   Holly Starr            MRN: [id number]            FIN: [id number]               Age:   50 years     Sex:  Female     :  1970   Associated Diagnoses:   None   Author:   Cas Tyler MD      Basic Information   Admit information:  see history and physical.       OPERATIVE DICTATION NOTE: Vaginal laceration repair      Date: 2020    Preoperative diagnosis: Vaginal laceration    Postoperative diagnosis: Same.  [ ]    Surgeon: Cas Tyler M.D.    Asst.:[ ]    Anesthesia: General    Estimated blood loss: 50 mL      Indications: 50-year-old female who had a vaginal laceration after sexual intercourse tonight.  Laceration with continued steady bleeding.  Decision was made to proceed with vaginal laceration repair.  Alternatives, risks, and complications were discussed the patient.  Questions were answered to her satisfaction.  Operative consents were signed.    Procedure in detail: The patient was taken the operating room and placed supine upon the table.  After adequate level of anesthesia was prepped and sterilely draped.  A speculum was placed and the laceration was identified in the right upper vagina near the vaginal cuff area.  Laceration approximately 3-1/2 cm.    Multiple figure of eights stitches were placed using a #1 chromic suture.  Adequate hemostasis was accomplished.    Patient went to recovery in stable condition.

## 2022-04-30 NOTE — OP NOTE
Patient:   Holly Starr            MRN: [id number]            FIN: [id number]               Age:   47 years     Sex:  Female     :  1970   Associated Diagnoses:   None   Author:   Grady Finn MD            DATE OF SURGERY:   18    SURGEON:  Grady Finn MD    PREOPERATIVE DIAGNOSIS:  Symptomatic Cholelithiasis    POST OPERATIVE DIAGNOSIS:  Same.    PROCEDURE:  Laparoscopic cholecystectomy.    ANESTHESIA:  General endotracheal anesthesia.    ESTIMATED BLOOD LOSS:  Approximately 20 cc.   Blood administered, none.    LAP AND INSTRUMENT COUNT:  Correct X2 at the end of the case.    SPECIMENS:  Gallbladder.    INDICATION AND SIGNIFICANT HISTORY:  Patient is a 47-year-old female complaining of post prandial right upper quadrant pain associated with fatty meals.  Patient was worked up clincally and found to have symptomatic cholelithiasis.  Risks and benefits of surgery was discussed with the patient who voiced understanding of risks / benefits and elected to proceed with surgery.        PROCEDURE IN DETAIL:  Once informed consents were obtained, patient was taken to the operating room and placed supine on the operating table.  After general endotracheal anesthesia was induced, the abdomen was prepped and draped in the standard sterile surgical fashion.  Periumbilical incision was made with the scalpel and the Veress needle was used to enter the abdominal cavity.  Pneumoperitoneum was then created with insufflation.  After adequate insufflation was obtained, 11 millimeter trocar port were placed through this wound.  Two additional 5 millimeter ports were placed in the right upper quadrant followed by 5 millimeter trocar placed subxiphoid.  The gallbladder was then visualized appeared to have chronic inflammatory changes and retracted both superiorly and laterally to achieve the critical view.  Dissection was carried out in lateral to medial fashion.  The cystic duct were first visualized  followed by cystic artery appropriate.  Two clips were placed twice proximally on each structure and one distally and resected.  The gallbladder was then removed from the liver bed using the hook cautery and passed off the table as specimen through the periumbilical trocar port site.  Attention was then redirected to the gallbladder fossa, no active bleeding was noted.  Good hemostasis was visualized.  All ports were then removed under direct visualization with no active bleeding noted.  Skin was then closed with 4-0 Vicryl suture in interrupted fashion.  Skin was  cleaned and dry sterile dressing was placed on the wound.  Lap and instrument  counts were correct X2 at the end of the case.  Patient tolerated the procedure well and was transferred to recovery room in good condition.

## 2022-04-30 NOTE — ED PROVIDER NOTES
"   Patient:   Holly Starr            MRN: [id number]            FIN: [id number]               Age:   50 years     Sex:  Female     :  1970   Associated Diagnoses:   Vaginal laceration; Anemia; Anticoagulated on Coumadin   Author:   Hadley Webster MD      Basic Information   Time seen: Date & time 10/20/2020 21:58:00.   History source: Patient.   Arrival mode: Ambulance.   History limitation: None.   Additional information: Chief Complaint from Nursing Triage Note : Chief Complaint   10/20/2020 21:47 CDT     Chief Complaint           Chief Complaint  .   Provider/Visit info:   Time Seen:  Hadley Webster MD / 10/20/2020 21:58  .   History of Present Illness   The patient presents with 50-year-old female with a history of mechanical heart valve on Coumadin presents with vaginal bleeding starting around 1900 tonight.  Patient states she was sexually active for the 1st time in 7 months just prior to the bleeding.  She denies rough or overly painful sexual intercourse.  Patient reports she went through about 3-4 depends prior to calling EMS.  No other complaints..  The onset was 3  hours ago.  The course/duration of symptoms is constant.  Location: vagina. Radiating pain: none. The character of symptoms is Bleeding.  The degree of symptoms is moderate, "heavy bleeding", passing clots.  Risk factors consist of Anticoagulated.  Prior episodes: none.  Therapy today: see nurses notes.  Associated symptoms: none.        Review of Systems   Constitutional symptoms:  Negative except as documented in HPI.   Skin symptoms:  Negative except as documented in HPI.   Eye symptoms:  Negative except as documented in HPI.   ENMT symptoms:  Negative except as documented in HPI.   Respiratory symptoms:  Negative except as documented in HPI.   Cardiovascular symptoms:  Negative except as documented in HPI.   Gastrointestinal symptoms:  Negative except as documented in HPI.   Genitourinary symptoms:  Vaginal bleeding. "   Musculoskeletal symptoms:  Negative except as documented in HPI.   Neurologic symptoms:  Negative except as documented in HPI.   Psychiatric symptoms:  Negative except as documented in HPI.   Endocrine symptoms:  Negative except as documented in HPI.   Hematologic/Lymphatic symptoms:  Negative except as documented in HPI.   Allergy/immunologic symptoms:  Negative except as documented in HPI.      Health Status   Allergies:    Allergic Reactions (Selected)  Severity Not Documented  Niacin- Tachycardia.  Remeron- Nerves on fire.,    Allergies (2) Active Reaction  niacin Tachycardia  Remeron nerves on fire  .   Medications:  (Selected)   Prescriptions  Prescribed  DuoNeb 0.5 mg-2.5 mg/3 mL inhalation solution: 3 mL, INH, QID, PRN PRN as needed for shortness of breath or wheezing, # 30 EA, 11 Refill(s), [id number], 170, cm, Height/Length Dosing, 07/06/20 13:10:00 CDT, 103, kg, Weight Dosing, 07/06/20 13:10:00 CDT  Flovent  mcg/inh inhalation aerosol: 2 puff(s), INH, BID, # 12 gm, 0 Refill(s), Pharmacy: [id number], 170, cm, Height/Length Dosing, 05/13/20 13:45:00 CDT, 104, kg, Weight Dosing, 05/13/20 13:45:00 CDT  Lunesta 3 mg oral tablet: 3 mg = 1 tab(s), Oral, Once a day (at bedtime), PRN PRN for insomnia, do not chew or break tablets, X 30 day(s), # 30 tab(s), 2 Refill(s), [id number], 170, cm, Height/Length Dosing, 10/06/20 15:27:00 CDT, 104.7, kg, Weight Dosing, 10...  QUEtiapine 400 mg oral tablet: 400 mg = 1 tab(s), Oral, At Bedtime, # 30 tab(s), 11 Refill(s), [id number], 170, cm, Height/Length Dosing, 07/06/20 13:10:00 CDT, 103, kg, Weight Dosing, 07/06/20 13:10:00 CDT  cetirizine 10 mg oral tablet: See Instructions, TAKE 1 TABLET BY MOUTH EVERY DAY, # 30 tab(s), 11 Refill(s), eRx: [id number]  nebulizer: nebulizer, See Instructions, please dispense one nebulizer and tubing. dx j40, # 1 units, 0 Refill(s)  rosuvastatin 20 mg oral tablet:  See Instructions, TAKE 1 TABLET BY MOUTH EVERYDAY AT BEDTIME, # 30 tab(s), 11 Refill(s), Pharmacy: [id number], 170, cm, Height/Length Dosing, 07/06/20 13:10:00 CDT, 103, kg, Weight Dosing, 07/06/20 13:10:00 CDT  sertraline 100 mg oral tablet: 150 mg = 1.5 tab(s), Oral, qPM, # 45 tab(s), 11 Refill(s), Pharmacy: [facility] #[id number], 170, cm, Height/Length Dosing, 07/06/20 13:10:00 CDT, 103, kg, Weight Dosing, 07/06/20 13:10:00 CDT  sucralfate 1 g oral tablet: See Instructions, TAKE 1 TABLET BY MOUTH 4 TIMES A DAY BEFORE MEALS AT BEDTIME ON AN EMPTY STOMACH, # 120 tab(s), 1 Refill(s), Pharmacy: [id number], 170, cm, Height/Length Dosing, 03/17/20 13:03:00 CDT, 106, kg, Weight Dosing, 03/17/20 13:03:00 CDT...  Documented Medications  Documented  LORAZEPAM 0.5 MG TABLET: 0.5 mg = 1 tab(s), Oral, TID, PRN PRN anxiety  warfarin 2 mg oral tablet: 2 mg = 1 tab(s), Oral, Daily, # 30 tab(s), 0 Refill(s).      Past Medical/ Family/ Social History   Medical history:    Active  HLD - Hyperlipidemia (595375687)  Pulmonary hypertension (168960373)  PAD - Peripheral arterial disease ([id number])  Resolved  HTN - Hypertension (1238737574):  Resolved.  Anxiety ([id number]):  Resolved.  Depression (175835444):  Resolved.  Acute sinusitis (73504104):  Resolved.  Gallbladder disease (141537375):  Resolved.  Restless legs syndrome (76514002):  Resolved.  Panic attack (092633526):  Resolved.  Mechanical heart valve replacement (9018498883):  Resolved.  Comments:  4/23/2018 CDT 16:01 AISHAT - Mirtha HOLLOWAY, Ning Grant  2008 4/23/2018 CDT 16:01 CDT - Mirtha HOLLOWAY, Ning Grant  aortic valve  GERD - Gastro-esophageal reflux disease ([id number]):  Resolved.  CAD - Coronary artery disease (5114989325):  Resolved.  Obesity (9903736636):  Resolved.  Insomnia (965371669):  Resolved.  Kidney infection (873426746):  Resolved., Reviewed as documented in chart.   Surgical history:    Cystoscopy w/Ureteroscopic Stone  Extract (.) on 11/15/2019 at 49 Years.  Comments:  2019 10:32 Christelle Eng LPN  auto-populated from documented surgical case  Colonoscopy ([id number]) on 2019 at 48 Years.  Comments:  2019 10:49 Vaishnavi Mak LPN  [facility]  Mammogram (174702316) on 2019 at 48 Years.  Comments:  2019 15:07 Vaishnavi Mak LPN  [facility]  Total hysterectomy (913471217) on 2019 at 48 Years.  Comments:  2019 16:31 TEE Mesa MD, Priti MONZON  pt had complete hyst  Left Heart Catheterization on 2018 at 47 Years.  Venogram on 2018 at 47 Years.  AGUILAR on 2018 at 47 Years.  Bone density scan (538151727) on 2018 at 47 Years.  Cholecystectomy Laparoscopic (.) on 2018 at 47 Years.  Comments:  2018 10:33 PORSCHE - Melo Todd RN  auto-populated from documented surgical case  Esophagogastroduodenoscopy (061804770) on 2018 at 47 Years.  Foot repair (368134249) in  at 44 Years.  Colonoscopy (041336012) on 2011 at 40 Years.  Gastric bypass operation (63211658) on 10/1/2010 at 39 Years.  Comments:  2018 15:28 Kayla Vazquez RN    Mammogram (921394028) on 1978 at 7 Years.  Comments:  7/3/2018 15:13 Vaishnavi Mak LPN  [facility]  Laparoscopy (559836897).  Tonsillectomy (368891710).  FESS - Functional endoscopic sinus surgery (060676537).  mechanical heart valve sx .  Aortic Valve Repair/Replacement x 4.  Comments:  2018 9:24 Mervat Randle RN  1970, , ,   Cystoscope.  Percutaneous transluminal insertion of peripheral stent into artery ([id number])., Reviewed as documented in chart.   Family history:    Breast cancer.  Mother ()  Bladder cancer.  Father  , Reviewed as documented in chart.   Social history:    Social & Psychosocial Habits    Alcohol  2018  Use: Current    Type: Wine    Frequency: 1-2 times per month    Average  drinks per episode in last year: 2    Maximum drinks per episode in last year: 2    Started at age: 17 Years    Previous treatment: None    Has alcohol use interfered with work or home life? No    Do you ever drink more than intended? No    Has anyone been hurt or at risk by your drinking? No    Ready to change: No    Concerns about alcohol use in household: No    Employment/School  09/19/2016  Status: Employed    Exercise  09/19/2016  Duration (average number of minutes): 30    Times per week: 1-2 times/week    Exercise type: Walking    Home/Environment  07/20/2017  Lives with: Children, Spouse    Living situation: Home/Independent    Nutrition/Health  09/19/2016  Type of diet: Regular    Sexual  09/19/2016  Sexually active: Yes    Substance Use  09/19/2016  Use: Never    Tobacco  05/13/2020  Use: Never (less than 100 in l    Patient Wants Consult For Cessation Counseling N/A    05/14/2020  Use: Never (less than 100 in l    Patient Wants Consult For Cessation Counseling No    07/06/2020  Use: Never (less than 100 in l    Patient Wants Consult For Cessation Counseling No    10/06/2020  Use: Never (less than 100 in l    Patient Wants Consult For Cessation Counseling No    Abuse/Neglect  05/13/2020  SHX Any signs of abuse or neglect No    05/14/2020  SHX Any signs of abuse or neglect No    Feels unsafe at home: No    Safe place to go: Yes    06/25/2020  SHX Any signs of abuse or neglect No    07/06/2020  SHX Any signs of abuse or neglect No    Feels unsafe at home: No    Safe place to go: Yes    10/06/2020  SHX Any signs of abuse or neglect No    Feels unsafe at home: No    Safe place to go: Yes  , Reviewed as documented in chart, Alcohol use: Occasionally, Tobacco use: Denies, Drug use: Denies, Occupation: Employed.   Problem list:    Active Problems (13)  Anticoagulated on Coumadin   Anxiety   Depression   GERD - Gastro-esophageal reflux disease   HLD - Hyperlipidemia   Insomnia   Kidney stone   Mechanical heart  valve present   Obesity   PAD - Peripheral arterial disease   Psoriatic arthritis   Pulmonary hypertension   Urge incontinence of urine   .      Physical Examination               Vital Signs      Vital Signs (last 24 hrs)_____  Last Charted___________  Temp Oral     36.8 DegC  (OCT 20 21:47)  Heart Rate Peripheral   62 bpm  (OCT 20 21:47)  Resp Rate         20 br/min  (OCT 20 21:47)  SBP      107 mmHg  (OCT 20 21:47)  DBP      64 mmHg  (OCT 20 21:47)  SpO2      99 %  (OCT 20 21:47)  .   General:  Alert, no acute distress.    Skin:  Warm, dry.    Head:  Normocephalic.   Neck:  Supple.   Eye:  Extraocular movements are intact, normal conjunctiva.    Ears, nose, mouth and throat:  Oral mucosa moist.   Cardiovascular:  Regular rate and rhythm.   Respiratory:  Lungs are clear to auscultation.   Chest wall:  No tenderness.   Musculoskeletal:  Normal ROM, normal strength.    Gastrointestinal:  Soft, Nontender, Non distended, Normal bowel sounds, Rectum appears intact without laceration or bleeding..    Genitourinary:  External genitalia: Large blood clots matted in the hair of the external vagina.  There is a 1 cm left periurethral tear with minimal bleeding.  On speculum exam- Numerous dark blood clots noted in the vaginal vault.  No obvious source of bleeding., Speculum exam: Moderate, bleeding, blood clots.    Neurological:  Alert and oriented to person, place, time, and situation, No focal neurological deficit observed, CN II-XII intact, normal sensory observed, normal motor observed, normal speech observed, normal coordination observed.    Lymphatics:  No lymphadenopathy.   Psychiatric:  Cooperative, appropriate mood & affect.       Medical Decision Making   Documents reviewed:  Emergency department nurses' notes, emergency department records, prior records.    Results review:  Lab results : Lab View   10/20/2020 22:25 CDT     Sodium Lvl                139 mmol/L                             Potassium Lvl              4.2 mmol/L                             Chloride                  107 mmol/L                             CO2                       22 mmol/L                             Calcium Lvl               10.0 mg/dL                             Glucose Lvl               118 mg/dL  HI                             BUN                       14.6 mg/dL                             Creatinine                0.79 mg/dL                             eGFR-AA                   >60  NA                             eGFR-TITI                  >60  NA                             Bili Total                0.5 mg/dL                             Bili Direct               0.2 mg/dL                             Bili Indirect             0.30 mg/dL                             AST                       26 unit/L                             ALT                       22 unit/L                             Alk Phos                  130 unit/L                             Total Protein             6.6 gm/dL                             Albumin Lvl               3.9 gm/dL                             Globulin                  2.7 gm/dL                             A/G Ratio                 1.4 ratio                             PT                        23.6 sec  HI                             INR                       2.1                             WBC                       18.7 x10(3)/mcL  HI                             RBC                       3.80 x10(6)/mcL  LOW                             Hgb                       9.1 gm/dL  LOW                             Hct                       30.8 %  LOW                             Platelet                  289 x10(3)/mcL                             MCV                       81.1 fL                             MCH                       23.9 pg  LOW                             MCHC                      29.5 gm/dL  LOW                             RDW                       16.3 %                             MPV                        10.3 fL                             Abs Neut                  15.38 x10(3)/mcL  HI                             Neutro Auto               82.3 %  HI                             Lymph Auto                11.6 %  LOW                             Mono Auto                 5.0 %                             Eos Auto                  0.2 %                             Abs Eos                   0.03 x10(3)/mcL                             Basophil Auto             0.4 %                             Abs Neutro                15.38 x10(3)/mcL  HI                             Abs Lymph                 2.17 x10(3)/mcL                             Abs Mono                  0.93 x10(3)/mcL                             Abs Baso                  0.07 x10(3)/mcL                             NRBC%                     0.0 %                             IG%                       0.500 %  HI                             IG#                       0.0900  HI                             Hypochrom                 2+                             Platelet Est              Adequate  ,    No qualifying data available.       Reexamination/ Reevaluation   Vital signs   Case discussed with Dr. Lay   Course: progressing as expected.   Assessment: exam improved.      Procedure   Procedure notes:   Large blood clots noted to the external vagina.  These were cleared away with normal saline and 4 x 4's.  There is a 1 cm left periurethral tear with minimal bleeding.  Numerous dark blood clots noted in the vaginal vault.  4 x 4's attached a ring forceps were used to try and identify a bleeding laceration.  No obvious source of bleeding.  Patient with moderate amount of bleeding even after vaginal exam.  The decision was made to pack the vagina with Kerlix.  Hemostasis obtained with no overflow bleeding noted.      Impression and Plan   Diagnosis   Vaginal laceration (UZY37-QB S31.41XA)   Anemia (DHA05-SA D64.9)   Anticoagulated on Coumadin (GYZ92-NA  Z79.01)      Calls-Consults   -  10/20/2020 23:13:00 , Jayson ESPINAL, Cas GELLER, 23:25- okay with packing.  Transfer to [facility] ED..    Plan   Condition: Guarded.    Disposition: Dispositioned by: Time: 10/20/2020 23:13:00, Hadley Webster MD.    Counseled: Patient, Friend, Regarding diagnosis, Regarding diagnostic results, Regarding treatment plan, Patient indicated understanding of instructions.    Notes: Patient with large amount of vaginal bleeding without identification of active site.  I suspect she has an intravaginal laceration.  Patient with a drop in hemoglobin from 11-9.  I expect that the patient's hemoglobin will drop even further.  Patient will need immediate repeat CBC and Xmatch upon arrival into [facility] ED.  Dr. Tyler is aware of the patient..

## 2022-04-30 NOTE — DISCHARGE SUMMARY
Patient:   Holly Starr            MRN: [id number]            FIN: [id number]               Age:   50 years     Sex:  Female     :  1970   Associated Diagnoses:   None   Author:   Mera ESPINAL, Abdifatah ADAMS      Discharge Information   Discharge Summary Information     Admitting physician: Cas Tyler MD     Vaginal Laceration with active bleeding.        Physical Examination   Vital signs   BP: within normal limits.     Pulse: within normal limits.     Resp: within normal limits.     General   Within normal limits.     Alert and oriented X3.     No acute distress.     Able to ambulate: within normal limits.     Gastrointestinal   Abdomen: benign.     Integumentary   Within normal limits.        Hospital Course   Hospital Course   Admitting diagnosis: Postcoital vaginal laceration with active bleeding, S/P Valve replacement on Coumadin.     Medical management: Vaginal packing in the ER - unsuccessful. Surgical repair performed in the OR without complication.  Currently patient is well and without complaints.  She is anemic, but tolerating ambulation to the bathroom without difficulty..     this 50-year-old with a history of heart valve replacement on Coumadin presented with acute onset of vaginal bleeding during intercourse.  Her hematocrit dropped from 30-25 and vaginal packing in the emergency room was unsuccessful.  She was brought to the operative suite and underwent suturing of the vaginal laceration.  Her Coumadin was held last evening.  Today she is without complaints.  She has ambulated to the bathroom and denies vaginal bleeding.  She denies pain.  Her vitals were stable she is afebrile.  Examination is unremarkable. She will be discharged in stable condition and desires follow-up at the women's health clinic at [facility].  She was instructed to resume Coumadin today and follow-up at the Coumadin clinic in [place] as she does routinely.      Discharge Plan   Discharge Summary Plan   Discharge  Status: stable.     Discharge Disposition: discharge to home self care.     Prescriptions: continue same medications.     Orders: Pelvic rest X 4 weeks.  Follow up [facility] G[facility] clinic 2 weeks.

## 2022-04-30 NOTE — OP NOTE
DATE OF SURGERY:        SURGEON:  Chintan Johnson DO    PREOPERATIVE DIAGNOSES:    1. Severe ostial left common iliac vein compression with associated symptoms.  2. Chronic right iliac vein compression.  3. Peripheral edema.  4. Venous hypertension.  5. Venous intermittent claudication.  6. History of mechanical prosthetic aortic valve replacement.  7. Hypertension.  8. Dyslipidemia.    POSTOPERATIVE DIAGNOSES:    1. Severe ostial left common iliac vein compression with associated symptoms.  2. Chronic right iliac vein compression.  3. Peripheral edema.  4. Venous hypertension.  5. Venous intermittent claudication.  6. History of mechanical prosthetic aortic valve replacement.  7. Hypertension.  8. Dyslipidemia.    PROCEDURES:    1. Bilateral lower extremity and inferior vena cava venogram.  2. Simultaneous bilateral common iliac vein percutaneous transluminal venoplasty/stenting.    COMPLICATIONS:  None.    ESTIMATED BLOOD LOSS:  None.    CONTRAST UTILIZED:  Approximately 30 mL.    HISTORY:  Mrs. Starr was brought to the cardiac catheterization laboratory today for the above outlined procedures.  Informed consent was obtained.  Based on a recent venography procedure with intravascular ultrasound, she was found to have severe ostial left common iliac vein compression and probable moderate right iliac vein compression.  Using ultrasound guidance, we accessed the bilateral femoral veins and positioned 8-Israeli sheaths and performed bilateral lower extremity and inferior  vena cava venography through the established location of the compression.  We then closed our access sites with the Perclose closure device.  We then up-sized to 10-Israeli sheaths bilaterally.  We then simultaneously deployed 20 x 80 mm wall stents to the bilateral common iliac veins being careful to make sure that we captured the ostium of the left common iliac vein.  She was noted to have significant trans pelvic venous collaterals  consistent with venous compression disease.  These stents were post dilated with a 16 x 60 mm balloon and subsequent venography showed adequate venous outflow from the bilateral lower extremities and adequate stent deployment.  We opted to forego post stent intravascular ultrasonography.  There was also mild distal collateral venous flow noted.  Nevertheless, the patient tolerated the procedure rather well.  Access sites were closed with the Perclose device and the patient tolerated the procedure rather well and left the cardiac catheterization laboratory in stable condition.        ______________________________  Chintan Johnson DO    CT/UR  DD:  09/01/2018  Time:  03:04PM  DT:  09/01/2018  Time:  03:54PM  Job #:  [id number]    cc: Chintan Johnson DO

## 2022-04-30 NOTE — ED PROVIDER NOTES
Patient:   Holly Starr            MRN: [id number]            FIN: [id number]               Age:   50 years     Sex:  Female     :  1970   Associated Diagnoses:   S/P hysterectomy; Anemia; Vaginal laceration; Anticoagulated on Coumadin   Author:   Ronnie Mendiola MD      Basic Information   Time seen: Date & time 10/21/2020 00:55:00.   Additional information: Chief Complaint from Nursing Triage Note : Chief Complaint   10/20/2020 21:47 CDT     Chief Complaint           Chief Complaint  .      History of Present Illness   The patient presents with Transferred from [facility] for GYN evaluation of vaginal bleeding after intercourse.  She is on Coumadin due to a mechanical aortic valve.  Denies any other past medical history.  Patient has had a history of a hysterectomy years ago in [place] had intercourse tonight no pain but started having bright red bleeding afterwards (onset 18:30). Vagina was packed with Kerlix prior to transfer.  The note mentions a periurethral laceration on the left side.  Patient still having bleeding on arrival here she put out a large number of clots on for depends at home and then again here in the ED.  Not having any pain except with exam of the vagina.  Vital signs are stable.  Hemoglobin obtained earlier tonight 9.  The onset was 6  hours ago.  The course/duration of symptoms is constant.  Radiating pain: none.       Review of Systems   Constitutional symptoms:  Negative except as documented in HPI.   Skin symptoms:  Negative except as documented in HPI.   Eye symptoms:  Negative except as documented in HPI.   ENMT symptoms:  Negative except as documented in HPI.   Respiratory symptoms:  Negative except as documented in HPI.   Cardiovascular symptoms:  Negative except as documented in HPI.   Gastrointestinal symptoms:  Negative except as documented in HPI.   Genitourinary symptoms:  Negative except as documented in HPI.   Musculoskeletal symptoms:   Negative except as documented in HPI.   Neurologic symptoms:  Negative except as documented in HPI.      Health Status   Allergies:    Allergic Reactions (Selected)  Severity Not Documented  Niacin- Tachycardia.  Remeron- Nerves on fire.,    Allergies (2) Active Reaction  niacin Tachycardia  Remeron nerves on fire  .   Medications:  (Selected)   Prescriptions  Prescribed  DuoNeb 0.5 mg-2.5 mg/3 mL inhalation solution: 3 mL, INH, QID, PRN PRN as needed for shortness of breath or wheezing, # 30 EA, 11 Refill(s), [id number], 170, cm, Height/Length Dosing, 07/06/20 13:10:00 CDT, 103, kg, Weight Dosing, 07/06/20 13:10:00 CDT  Flovent  mcg/inh inhalation aerosol: 2 puff(s), INH, BID, # 12 gm, 0 Refill(s), [id number], 170, cm, Height/Length Dosing, 05/13/20 13:45:00 CDT, 104, kg, Weight Dosing, 05/13/20 13:45:00 CDT  Lunesta 3 mg oral tablet: 3 mg = 1 tab(s), Oral, Once a day (at bedtime), PRN PRN for insomnia, do not chew or break tablets, X 30 day(s), # 30 tab(s), 2 Refill(s), [id number], 170, cm, Height/Length Dosing, 10/06/20 15:27:00 CDT, 104.7, kg, Weight Dosing, 10...  QUEtiapine 400 mg oral tablet: 400 mg = 1 tab(s), Oral, At Bedtime, # 30 tab(s), 11 Refill(s), [id number], 170, cm, Height/Length Dosing, 07/06/20 13:10:00 CDT, 103, kg, Weight Dosing, 07/06/20 13:10:00 CDT  cetirizine 10 mg oral tablet: See Instructions, TAKE 1 TABLET BY MOUTH EVERY DAY, # 30 tab(s), 11 Refill(s), eRx: [facility] #[id number]  nebulizer: nebulizer, See Instructions, please dispense one nebulizer and tubing. dx j40, # 1 units, 0 Refill(s)  rosuvastatin 20 mg oral tablet: See Instructions, TAKE 1 TABLET BY MOUTH EVERYDAY AT BEDTIME, # 30 tab(s), 11 Refill(s), Pharmacy: [facility] STORE [id number], 170, cm, Height/Length Dosing, 07/06/20 13:10:00 CDT, 103, kg, Weight Dosing, 07/06/20 13:10:00 CDT  sertraline 100 mg oral tablet: 150 mg = 1.5 tab(s), Oral, qPM, # 45 tab(s), 11  Refill(s), Pharmacy: [facility]/pharmacy #[id number], 170, cm, Height/Length Dosing, 07/06/20 13:10:00 CDT, 103, kg, Weight Dosing, 07/06/20 13:10:00 CDT  sucralfate 1 g oral tablet: See Instructions, TAKE 1 TABLET BY MOUTH 4 TIMES A DAY BEFORE MEALS AT BEDTIME ON AN EMPTY STOMACH, # 120 tab(s), 1 Refill(s), Pharmacy: [facility] STORE [id number], 170, cm, Height/Length Dosing, 03/17/20 13:03:00 CDT, 106, kg, Weight Dosing, 03/17/20 13:03:00 CDT...  Documented Medications  Documented  LORAZEPAM 0.5 MG TABLET: 0.5 mg = 1 tab(s), Oral, TID, PRN PRN anxiety  metoprolol succinate 25 mg oral tablet, extended release: 25 mg = 1 tab(s), Oral, qPM  warfarin 2 mg oral tablet: 2 mg = 1 tab(s), Oral, Daily, # 30 tab(s), 0 Refill(s).      Past Medical/ Family/ Social History   Medical history:    Active  HLD - Hyperlipidemia (442106746)  Pulmonary hypertension (878675532)  PAD - Peripheral arterial disease ([id number])  Resolved  HTN - Hypertension (0993680025):  Resolved.  Anxiety ([id number]):  Resolved.  Depression (709891555):  Resolved.  Acute sinusitis (68357849):  Resolved.  Gallbladder disease (066972356):  Resolved.  Restless legs syndrome (70225916):  Resolved.  Panic attack (618161746):  Resolved.  Mechanical heart valve replacement (3126808249):  Resolved.  Comments:  4/23/2018 CDT 16:01 Ning Bowden RN  2008 4/23/2018 CDT 16:01 Ning Bowden RN  aortic valve  GERD - Gastro-esophageal reflux disease (4934717453):  Resolved.  CAD - Coronary artery disease (4446022358):  Resolved.  Obesity (1604286963):  Resolved.  Insomnia (698418123):  Resolved.  Kidney infection (375539862):  Resolved..   Surgical history:    Cystoscopy w/Ureteroscopic Stone Extract (.) on 11/15/2019 at 49 Years.  Comments:  11/18/2019 10:32 TEE - Christelle Ewing LPN  auto-populated from documented surgical case  Colonoscopy ([id number]) on 5/30/2019 at 48 Years.  Comments:  5/30/2019 10:49 PORSCHE [facility]  Mammogram ([id number]) on 2019 at 48 Years.  Comments:  2019 15[facility]07 [facility]  Total hysterectomy (406743850) on 2019 at 48 Years.  Comments:  2019 16:31 CST - Bonita ESPINAL, Priti MIJARES.  pt had complete hyst  Left Heart Catheterization on 2018 at 47 Years.  Venogram on 2018 at 47 Years.  AGUILAR on 2018 at 47 Years.  Bone density scan (074970455) on 2018 at 47 Years.  Cholecystectomy Laparoscopic (.) on 2018 at 47 Years.  Comments:  2018 10:33 CDT - Melo Todd RN  auto-populated from documented surgical case  Esophagogastroduodenoscopy (342244589) on 2018 at 47 Years.  Foot repair (815677857) in  at 44 Years.  Colonoscopy (446853262) on 2011 at 40 Years.  Gastric bypass operation ([id number]) on 10/1/2010 at 39 Years.  Comments:  2018 15:28 CDT - Kayla Hanson RN    Mammogram ([id number]) on 1978 at 7 Years.  Comments:  7/3/2018 15:13 [facility]  Laparoscopy (044482577).  Tonsillectomy (406696917).  FESS - Functional endoscopic sinus surgery ([id number]).  mechanical heart valve sx .  Aortic Valve Repair/Replacement x 4.  Comments:  2018 9:24 CDT - Mervat Tate RN  , , ,   Cystoscope.  Percutaneous transluminal insertion of peripheral stent into artery ([id number])..   Family history:    Breast cancer.  Mother ()  Bladder cancer.  Father  .   Problem list:    Active Problems (13)  Anticoagulated on Coumadin   Anxiety   Depression   GERD - Gastro-esophageal reflux disease   HLD - Hyperlipidemia   Insomnia   Kidney stone   Mechanical heart valve present   Obesity   PAD - Peripheral arterial disease   Psoriatic arthritis   Pulmonary hypertension   Urge incontinence of urine   .      Physical Examination               Vital Signs   Vital Signs   10/21/2020 0:23 CDT      Temperature  Temporal Artery               36.8 DegC                             Peripheral Pulse Rate     90 bpm                             Respiratory Rate          18 br/min                             SpO2                      99 %                             Oxygen Therapy            Room air                             Systolic Blood Pressure   111 mmHg                             Diastolic Blood Pressure  74 mmHg                             Mean Arterial Pressure, Cuff              86 mmHg    10/20/2020 23:11 CDT     Peripheral Pulse Rate     62 bpm                             SpO2                      100 %                             Oxygen Therapy            Room air                             Systolic Blood Pressure   122 mmHg                             Diastolic Blood Pressure  57 mmHg  LOW    10/20/2020 22:43 CDT     Peripheral Pulse Rate     60 bpm                             Heart Rate Monitored      60 bpm                             SpO2                      99 %                             Oxygen Therapy            Room air                             Systolic Blood Pressure   119 mmHg                             Diastolic Blood Pressure  61 mmHg                             Mean Arterial Pressure, Cuff              80 mmHg    10/20/2020 21:47 CDT     Temperature Oral          36.8 DegC                             Temperature Oral (calculated)             98.24 DegF                             Peripheral Pulse Rate     62 bpm                             Respiratory Rate          20 br/min                             SpO2                      99 %                             Oxygen Therapy            Room air                             Systolic Blood Pressure   107 mmHg                             Diastolic Blood Pressure  64 mmHg  .      Vital Signs (last 24 hrs)_____  Last Charted___________  Temp Oral     36.8 DegC  (OCT 20 21:47)  Heart Rate Peripheral   90 bpm  (OCT 21 00:23)  Resp Rate         18 br/min   (OCT 21 00:23)  SBP      111 mmHg  (OCT 21 00:23)  DBP      74 mmHg  (OCT 21 00:23)  SpO2      99 %  (OCT 21 00:23)  .   General:  Alert, no acute distress.    Skin:  Warm, dry.    Head:  Atraumatic.   Cardiovascular:  Normal peripheral perfusion.   Respiratory:  Respirations are non-labored, Symmetrical chest wall expansion.    Gastrointestinal:  Nontender.   Genitourinary:  Blood clots around the external vagina and in the depends.  Kerlix was packed and there is blood clotting around the Curlex which was not removed.   Neurological:  Normal speech observed.   Psychiatric:  Cooperative.      Medical Decision Making   Rationale:  Still forming clots around the Curlex and in the depends.  I have not removed the Curlex and instead inform Dr. Tyler that she has arrived she immediately came to the ED to evaluate her.  INR is only 2 will not reverse her Coumadin at this time given that she has a mechanical valve.      Impression and Plan   Diagnosis   S/P hysterectomy (JMR61-IH Z90.710)   Anemia (RRG31-WU D64.9)   Vaginal laceration (JCE06-RG S31.41XA)   Anticoagulated on Coumadin (XCX71-GF Z79.01)      Calls-Consults   -  10/21/2020 01:00:00 , Cas Tyler MD, at bedside.    Plan   Condition: Improved, Stable.    Disposition: Admit.    Counseled: Patient, Regarding diagnosis, Regarding diagnostic results, Regarding treatment plan, Patient indicated understanding of instructions.    Notes

## 2022-04-30 NOTE — OP NOTE
DATE OF SURGERY:    08/01/2018    SURGEON:  Chintan Johnson DO    PREOPERATIVE DIAGNOSES:    1. Atypical angina.  2. Abnormal nuclear stress test.  3. Valvular heart disease with a history of mechanical aortic valve replacement.  4. Peripheral edema.  5. Chronic venous hypertension.  6. Chronic venous insufficiency, CEAP classification 3, 4.  7. Venous intermittent claudication symptoms of the bilateral extremities.    POSTOPERATIVE DIAGNOSES:    1. Mild nonobstructive coronary artery disease.  2. Stable and well-seated mechanical prosthetic aortic valve.  3. Moderately severe to severe pulmonary hypertension.  4. Severe ostial, left common iliac vein compression and moderate right iliac vein compression.    PROCEDURES:    1. Diagnostic angiography.  2. Angiography of the aortic root and the aortic arch.  3. Right femoral arteriography with a successful closure device placed.  4. Right heart catheterization.  5. Transesophageal echocardiography.  6. Selective bilateral extremity venography with intravascular ultrasonography.    COMPLICATIONS:  None.    ESTIMATED BLOOD LOSS:  None.    CONTRAST UTILIZED:  Approximately 80 cc.    ESTIMATED BLOOD LOSS:  None.    COMPLICATIONS:  None.    HISTORY:  Ms Starr was brought to the cardiac catheterization laboratory for the above outlined procedures.  Informed consent was obtained.  Using ultrasound and fluoroscopic guidance, we accessed the right femoral artery and right femoral vein, and positioned a 5-Central African arterial and a 7-Central African venous sheath.  We first proceeded with a right heart catheterization.  It showed evidence of severe pulmonary hypertension.  The pulmonary artery pressure was in the range of 53/31 with a mean pressure of 42 mmHg.  The mean pulmonary capillary wedge pressure was 36 mmHg.  Selective diagnostic coronary angiography revealed a rather large left coronary system and overall a mild diffuse nonobstructive coronary artery disease.  The right  coronary artery was more less a codominant vessel.  Left ventriculography was not performed in light of the presence of the mechanical prosthetic aortic valve.  Angiography of the aortic root did show evidence of mild aortic regurgitation and a well-seated mechanical prosthetic aortic valve.  Aortic root also appeared of normal caliber.  There was also a normal origin of the cerebral vessels.  Right femoral arteriography showed adequate arteriotomy site allowing for a successful closure device placement.  We then proceeded with the selective bilateral extremity venography.  Using ultrasound guidance, we accessed the bilateral femoral veins and positioned 8-Russian sheaths through which we proceeded with selective bilateral lower extremity venography of the iliofemoral venous system and the inferior vena cava.  We then followed with the bilateral intravascular ultrasonography of the bilateral iliofemoral venous system in the inferior vena cava.  The inferior vena cava was found to be widely patent.  There was severe ostial compression of the left common iliac vein and probable moderate right iliac vein compression.  The procedure was otherwise completed successfully.  The patient tolerated the procedure rather well, and left the cardiac catheterization laboratory in stable condition.        ______________________________  Chintan Johnson DO    CT/SD  DD:  08/02/2018  Time:  01:26PM  DT:  08/02/2018  Time:  01:57PM  Job #:  [id number]

## 2022-05-02 NOTE — HISTORICAL [facility] CERNER
This is a historical note converted from Bisi. Formatting and pictures may have been removed.  Please reference Bisi for original formatting and attached multimedia. Chief Complaint  sob, cough, achy, sore from coughing, headache, nausea, congestion for 1 week  History of Present Illness  3/5/2020 : 49-year-old female with 1 week of?subjective fever, myalgias,?had a scratchy throat over the last several days,?mostly dry cough with pleuritic chest pain bilaterally.? No abdominal pain.? No vomiting or diarrhea. [1]  Today patient returns to clinic complaining of shortness of breath, coughing, achy associated with sore throat and mucus both upper and lower respiratory since 1 week.? States was diagnosed with pneumonia?last visit?and completed taking Levaquin as directed.? No new fever. ?No history of asthma. ?Does not smoke tobacco. ?Has tolerated Z-Clemente in the past with no issues. ?Understands the risk and benefits.  Review of Systems  Constitutional: no fever, no weakness?  ENMT: sore throat , postnasal drainage, mucus production  Respiratory:?+ wheezing, no shortness of breath  Cardiovascular: no chest pain  Gastrointestinal: Denies nausea vomiting or abdominal pain  Musculoskeletal: no muscle or joint pain  Integumentary: no skin rash  Physical Exam  Vitals & Measurements  T:?37? ?C (Oral)? HR:?72(Peripheral)? RR:?17? BP:?116/80? SpO2:?98%?  HT:?170?cm? WT:?106?kg? BMI:?36.68?  General : Alert and Oriented, No apparent distress, afebrile  Neck - supple  HENT : Oropharynx?very mild redness and swelling.? Tonsils not enlarged.?? TMs intact mild fluid no redness.??  Respiratory :?Bilateral equal breath sounds, nonlabored respirations, scattered expiratory wheezing basal lung fields  Cardiovascular : Rate, rhythm regular, normal volume pulse, no murmur  Integumentary : Warm, Dry and no rash  Assessment/Plan  1.?Acute bronchitis?J20.9  ?Cold mist vaporizer and cough drops to keep the airways moist.  Reviewed the  chest x-ray, showing?increase in alveolar markings basal lung fields. ?Radiology final results monitored and reported  Coricidin HBP for cough and cold.? Claritin for congestion. ?Call or return to clinic for any questions  Ordered:  albuterol, 2 puff(s), INH, q6hr, PRN PRN for wheezing, # 1 EA, 0 Refill(s), [id number], 170, cm, Height/Length Dosing, 03/17/20 13:03:00 CDT, 106, kg, Weight Dosing, 03/17/20 13:03:00 CDT  azithromycin, = 1 packet(s), Oral, As Directed, per package labeling, X 5 day(s), # 6 tab(s), 0 Refill(s), Pharmacy: [facility]/pharmacy #[id number], 170, cm, Height/Length Dosing, 03/17/20 13:03:00 CDT, 106, kg, Weight Dosing, 03/17/20 13:03:00 CDT  Office/Outpatient Visit Level 3 Established [id number] PC, Acute bronchitis, UCC-RR, 03/17/20 13:40:00 CDT  XR Chest 2 Views, Routine, 03/17/20 13:27:00 CDT, Cough, None, Ambulatory, Patient Has IV?, Rad Type, Acute bronchitis, Not Scheduled, 03/17/20 13:27:00 CDT  ?  Flu test negative   Problem List/Past Medical History  Ongoing  Anticoagulated on Coumadin  Anxiety  Depression  GERD - Gastro-esophageal reflux disease  Insomnia  Kidney infection  Kidney stone  Mechanical heart valve present  Obesity  Panic attack  Psoriatic arthritis  Urge incontinence of urine  Historical  Acute sinusitis  Anxiety  CAD - Coronary artery disease  Depression  Gallbladder disease  GERD - Gastro-esophageal reflux disease  HLD - Hyperlipidemia  HTN - Hypertension  Insomnia  Mechanical heart valve replacement  Obesity  PAD - Peripheral arterial disease  Pulmonary hypertension  Restless legs syndrome  Procedure/Surgical History  Cystoscopy w/Ureteroscopic Stone Extract (.) (11/15/2019)  Cystourethroscopy, with ureteroscopy and/or pyeloscopy; with lithotripsy including insertion of indwelling ureteral stent (eg, Smith or double-J type) (11/15/2019)  Dilation of Left Ureter with Intraluminal Device, Via Natural or Artificial Opening Endoscopic (11/15/2019)  Fragmentation in  Left Ureter, Via Natural or Artificial Opening Endoscopic (11/15/2019)  Colonoscopy (05/30/2019)  Mammogram (04/17/2019)  Total hysterectomy (01/22/2019)  Dilation of Left Common Iliac Vein with Intraluminal Device, Percutaneous Approach (08/31/2018)  Dilation of Right Common Iliac Vein with Intraluminal Device, Percutaneous Approach (08/31/2018)  Transcatheter placement of an intravascular stent(s), open or percutaneous, including radiological supervision and interpretation and including angioplasty within the same vessel, when performed; each additional vein (List separately in addition to code f (08/31/2018)  Transcatheter placement of an intravascular stent(s), open or percutaneous, including radiological supervision and interpretation and including angioplasty within the same vessel, when performed; initial vein (08/31/2018)  Ultrasonography of Bilateral Lower Extremity Veins, Intravascular (08/31/2018)  Catheter placement in coronary artery(s) for coronary angiography, including intraprocedural injection(s) for coronary angiography, imaging supervision and interpretation; with right and left heart catheterization including intraprocedural injection(s) fo (08/01/2018)  Fluoroscopy of Left Heart using Low Osmolar Contrast (08/01/2018)  Fluoroscopy of Multiple Coronary Arteries using Low Osmolar Contrast (08/01/2018)  Fluoroscopy of Thoracic Aorta using Low Osmolar Contrast (08/01/2018)  Injection procedure during cardiac catheterization including imaging supervision, interpretation, and report; for supravalvular aortography (List separately in addition to code for primary procedure) (08/01/2018)  Injection procedure for extremity venography (including introduction of needle or intracatheter) (08/01/2018)  Intravascular ultrasound (noncoronary vessel) during diagnostic evaluation and/or therapeutic intervention, including radiological supervision and interpretation; each additional noncoronary vessel (List  separately in addition to code for primary procedur (08/01/2018)  Intravascular ultrasound (noncoronary vessel) during diagnostic evaluation and/or therapeutic intervention, including radiological supervision and interpretation; initial noncoronary vessel (List separately in addition to code for primary procedure) (08/01/2018)  Left Heart Catheterization (08/01/2018)  Measurement of Cardiac Sampling and Pressure, Bilateral, Percutaneous Approach (08/01/2018)  AGUILAR (08/01/2018)  Ultrasonography of Bilateral Lower Extremity Veins, Intravascular (08/01/2018)  Venogram (08/01/2018)  Echocardiography, transesophageal, real-time with image documentation (2D) (with or without M-mode recording); including probe placement, image acquisition, interpretation and report (07/31/2018)  Ultrasonography of Right and Left Heart, Transesophageal (07/31/2018)  Bone density scan (06/29/2018)  Cholecystectomy Laparoscopic (.) (04/27/2018)  Laparoscopy, surgical; cholecystectomy (04/27/2018)  Resection of Gallbladder, Percutaneous Endoscopic Approach (04/27/2018)  Esophagogastroduodenoscopy (04/06/2018)  Drainage of Abdomen Skin, External Approach (03/24/2018)  Incision and drainage of abscess (eg, carbuncle, suppurative hidradenitis, cutaneous or subcutaneous abscess, cyst, furuncle, or paronychia); simple or single (03/24/2018)  Catheter placement in coronary artery(s) for coronary angiography, including intraprocedural injection(s) for coronary angiography, imaging supervision and interpretation; (03/03/2017)  Fluoroscopy of Multiple Coronary Arteries using Low Osmolar Contrast (03/03/2017)  Foot repair (2015)  Colonoscopy (08/31/2011)  Gastric bypass operation (10/01/2010)  Mammogram (06/29/1978)  Aortic Valve Repair/Replacement x 4  Cystoscope  FESS - Functional endoscopic sinus surgery  Laparoscopy  mechanical heart valve sx 2008  Percutaneous transluminal insertion of peripheral stent into artery  Tonsillectomy    Medications  cetirizine 10 mg oral tablet, See Instructions, 11 refills  hydrocortisone topical 2.5% cream, 1 shawna, TOP, BID, PRN  LORAZEPAM 0.5 MG TABLET, 0.5 mg= 1 tab(s), Oral, TID, PRN  Lunesta 3 mg oral tablet, 3 mg= 1 tab(s), Oral, Once a day (at bedtime), PRN  mupirocin 2% topical ointment, 1 shawna, TOP, TID, 1 refills  ProAir HFA 90 mcg/inh inhalation aerosol with adapter, 2 puff(s), INH, q6hr, PRN  QUEtiapine 400 mg oral tablet, 400 mg= 1 tab(s), Oral, At Bedtime, 3 refills  rosuvastatin 20 mg oral tablet, See Instructions, 2 refills  sertraline 100 mg oral tablet, 150 mg= 1.5 tab(s), Oral, qPM, 3 refills  sucralfate 1 g oral tablet, 1 gm= 1 tab(s), Oral, QIDACHS, 1 refills  warfarin 2 mg oral tablet, 2 mg= 1 tab(s), Oral, Daily  Zithromax Z-Clemente 250 mg oral tablet, 1 packet(s), Oral, As Directed  Allergies  Remeron?(nerves on fire)  niacin?(Tachycardia)  Social History  Abuse/Neglect  No, 03/17/2020  Alcohol  Current, Wine, 1-2 times per month, 2 drinks/episode average. 2 drinks/episode maximum. Started age 17 Years. Previous treatment: None. Alcohol use interferes with work or home: No. Drinks more than intended: No. Others hurt by drinking: No. Ready to change: No. Household alcohol concerns: No., 07/31/2018  Employment/School  Employed, 09/19/2016  Exercise  Exercise duration: 30. Exercise frequency: 1-2 times/week. Exercise type: Walking., 09/19/2016  Home/Environment  Lives with Children, Spouse. Living situation: Home/Independent., 07/20/2017  Nutrition/Health  Regular, 09/19/2016  Sexual  Sexually active: Yes., 09/19/2016  Substance Use  Never, 09/19/2016  Tobacco  Never (less than 100 in lifetime), N/A, 03/17/2020  Family History  Bladder cancer.: Father.  Breast cancer.: Mother.  Primary malignant neoplasm of breast: Negative: Mother.  Health Maintenance  Health Maintenance  ???Pending?(in the next year)  ??? ??OverDue  ??? ? ? ?Coronary Artery Disease Maintenance-Lipid Lowering Therapy due??and  every?  ??? ? ? ?Diabetes Screening due??and every?  ??? ??Due?  ??? ? ? ?ADL Screening due??03/17/20??and every 1??year(s)  ??? ? ? ?Tetanus Vaccine due??03/17/20??and every 10??year(s)  ??? ??Due In Future?  ??? ? ? ?Hypertension Management-BMP not due until??11/12/20??and every 1??year(s)  ??? ? ? ?Smoking Cessation (Coronary Artery Disease) not due until??12/22/20??and every 2??year(s)  ??? ? ? ?Alcohol Misuse Screening not due until??01/01/21??and every 1??year(s)  ??? ? ? ?Obesity Screening not due until??01/01/21??and every 1??year(s)  ???Satisfied?(in the past 1 year)  ??? ??Satisfied?  ??? ? ? ?Alcohol Misuse Screening on??01/03/20.??Satisfied by Becca Todd  ??? ? ? ?Blood Pressure Screening on??03/17/20.??Satisfied by Afshan RT, Frankie A.  ??? ? ? ?Body Mass Index Check on??03/17/20.??Satisfied by Afshan RT, Frankie A.  ??? ? ? ?Breast Cancer Screening on??04/17/19.??Satisfied by Vaishnavi Lopez LPN  ??? ? ? ?Depression Screening on??01/03/20.??Satisfied by Becca Todd  ??? ? ? ?Diabetes Screening on??11/13/19.??Satisfied by Richard Rollins  ??? ? ? ?Hypertension Management-Blood Pressure on??03/17/20.??Satisfied by Afshan RT, Frankie A.  ??? ? ? ?Influenza Vaccine on??11/13/19.??Satisfied by Melva Torres RN  ??? ? ? ?Obesity Screening on??03/17/20.??Satisfied by Afshan RT, Frankie A.  ?  Lab Results  Test Name Test Result Date/Time   Influenza A POC Negative 03/17/2020 13:11 CDT   Influenza B POC Negative 03/17/2020 13:11 CDT      [1]?Office Visit Note; Ori Mayberry MD 03/05/2020 15:46 CST

## 2022-05-02 NOTE — HISTORICAL [facility] CERNER
This is a historical note converted from Bisi. Formatting and pictures may have been removed.  Please reference [place] for original formatting and attached multimedia. Chief Complaint  cough and congestion not getting better for about 3 weeks. Pt was dx with Acute bronchitis on 3/17  History of Present Illness  50 yo F with multiple chronic problems, on?Coumadin?due to a chemical?heart valve,?presents for continued cough and chest congestion. No fever.? Also using inhaler.  Patient was seen twice in the last month with 2 x-rays done, initial one with questionable pneumonia, patient completed Levaquin and azithromycin.  Review of Systems  Constitutional_negative for fever  ENMT_+rhinorrhea, + sore throat, no blurry vision or change in vision  Respiratory_+ cough  Gastrointestinal_negative for nausea or vomiting  Integumentary_negative for rash  Physical Exam  Vitals & Measurements  T:?37.0? ?C (Oral)? HR:?59(Peripheral)? RR:?18? BP:?114/81? SpO2:?99%?  HT:?170?cm? WT:?106?kg? BMI:?36.68?  Gen: WD NAD  CV: S1S2 RRR no MRG  Resp: wheeze BUL, BLL  HEENT: clear rhinorrhea, no cervical CJ, +PND, TMs without bulging or erythema bilaterally, no posterior pharyngeal erythema?  Psych: Cooperative, approp mood and affect  Assessment/Plan  1.?Chest congestion?R09.89  ?X-ray of the chest done today,?per my review?concerning for?left lower lobe infiltrate.  Will also call with final report.  Ordered:  doxycycline, 100 mg = 1 tab(s), Oral, BID, X 10 day(s), # 20 tab(s), 0 Refill(s), Pharmacy: [facility]/pharmacy #[id number], 170, cm, Height/Length Dosing, 03/29/20 13:07:00 CDT, 106, kg, Weight Dosing, 03/29/20 13:07:00 CDT  fluticasone, 2 puff(s), INH, BID, # 1 EA, 0 Refill(s), Pharmacy: [facility]/pharmacy #[id number], 170, cm, Height/Length Dosing, 03/29/20 13:07:00 CDT, 106, kg, Weight Dosing, 03/29/20 13:07:00 CDT  montelukast, 10 mg = 1 tab(s), Oral, Daily, # 30 tab(s), 0 Refill(s), Pharmacy: [facility]/pharmacy #[id number], 170, cm, Height/Length Dosing,  03/29/20 13:07:00 CDT, 106, kg, Weight Dosing, 03/29/20 13:07:00 CDT  Office/Outpatient Visit Level 4 Established [id number] PC, Chest congestion  Pneumonia involving left lung, UCC-RR, 03/29/20 13:50:00 CDT  XR Chest 2 Views, Routine, 03/29/20 13:24:00 CDT, Congestion, None, Ambulatory, Patient Has IV?, Rad Type, Chest congestion, Not Scheduled, 03/29/20 13:24:00 CDT  ?  2.?Pneumonia involving left lung?J18.9  ?Doxy with food, continue albuterol inhaler. May use albuterol inhaler?as needed for wheezing and shortness of breath or cough, 2 puffs every 4 hours.? Flovent twice a day.  Montelukast daily.  ER for severe worsening.  Ordered:  doxycycline, 100 mg = 1 tab(s), Oral, BID, X 10 day(s), # 20 tab(s), 0 Refill(s), Pharmacy: [facility]/pharmacy #[id number], 170, cm, Height/Length Dosing, 03/29/20 13:07:00 CDT, 106, kg, Weight Dosing, 03/29/20 13:07:00 CDT  fluticasone, 2 puff(s), INH, BID, # 1 EA, 0 Refill(s), Pharmacy: [facility]/pharmacy #[id number], 170, cm, Height/Length Dosing, 03/29/20 13:07:00 CDT, 106, kg, Weight Dosing, 03/29/20 13:07:00 CDT  montelukast, 10 mg = 1 tab(s), Oral, Daily, # 30 tab(s), 0 Refill(s), Pharmacy: [facility]/pharmacy #[id number], 170, cm, Height/Length Dosing, 03/29/20 13:07:00 CDT, 106, kg, Weight Dosing, 03/29/20 13:07:00 CDT  Office/Outpatient Visit Level 4 Established [id number] PC, Chest congestion  Pneumonia involving left lung, UCC-RR, 03/29/20 13:50:00 CDT  ?   Problem List/Past Medical History  Ongoing  Anticoagulated on Coumadin  Anxiety  Depression  GERD - Gastro-esophageal reflux disease  Insomnia  Kidney stone  Mechanical heart valve present  Obesity  PAD - Peripheral arterial disease  Panic attack  Psoriatic arthritis  Pulmonary hypertension  Urge incontinence of urine  Historical  Acute sinusitis  Anxiety  CAD - Coronary artery disease  Depression  Gallbladder disease  GERD - Gastro-esophageal reflux disease  HLD - Hyperlipidemia  HTN - Hypertension  Insomnia  Kidney infection  Mechanical heart valve  replacement  Obesity  Restless legs syndrome  Procedure/Surgical History  Cystoscopy w/Ureteroscopic Stone Extract (.) (11/15/2019)  Cystourethroscopy, with ureteroscopy and/or pyeloscopy; with lithotripsy including insertion of indwelling ureteral stent (eg, Smith or double-J type) (11/15/2019)  Dilation of Left Ureter with Intraluminal Device, Via Natural or Artificial Opening Endoscopic (11/15/2019)  Fragmentation in Left Ureter, Via Natural or Artificial Opening Endoscopic (11/15/2019)  Colonoscopy (05/30/2019)  Mammogram (04/17/2019)  Total hysterectomy (01/22/2019)  Dilation of Left Common Iliac Vein with Intraluminal Device, Percutaneous Approach (08/31/2018)  Dilation of Right Common Iliac Vein with Intraluminal Device, Percutaneous Approach (08/31/2018)  Transcatheter placement of an intravascular stent(s), open or percutaneous, including radiological supervision and interpretation and including angioplasty within the same vessel, when performed; each additional vein (List separately in addition to code f (08/31/2018)  Transcatheter placement of an intravascular stent(s), open or percutaneous, including radiological supervision and interpretation and including angioplasty within the same vessel, when performed; initial vein (08/31/2018)  Ultrasonography of Bilateral Lower Extremity Veins, Intravascular (08/31/2018)  Catheter placement in coronary artery(s) for coronary angiography, including intraprocedural injection(s) for coronary angiography, imaging supervision and interpretation; with right and left heart catheterization including intraprocedural injection(s) fo (08/01/2018)  Fluoroscopy of Left Heart using Low Osmolar Contrast (08/01/2018)  Fluoroscopy of Multiple Coronary Arteries using Low Osmolar Contrast (08/01/2018)  Fluoroscopy of Thoracic Aorta using Low Osmolar Contrast (08/01/2018)  Injection procedure during cardiac catheterization including imaging supervision, interpretation, and report;  for supravalvular aortography (List separately in addition to code for primary procedure) (08/01/2018)  Injection procedure for extremity venography (including introduction of needle or intracatheter) (08/01/2018)  Intravascular ultrasound (noncoronary vessel) during diagnostic evaluation and/or therapeutic intervention, including radiological supervision and interpretation; each additional noncoronary vessel (List separately in addition to code for primary procedur (08/01/2018)  Intravascular ultrasound (noncoronary vessel) during diagnostic evaluation and/or therapeutic intervention, including radiological supervision and interpretation; initial noncoronary vessel (List separately in addition to code for primary procedure) (08/01/2018)  Left Heart Catheterization (08/01/2018)  Measurement of Cardiac Sampling and Pressure, Bilateral, Percutaneous Approach (08/01/2018)  AGUILAR (08/01/2018)  Ultrasonography of Bilateral Lower Extremity Veins, Intravascular (08/01/2018)  Venogram (08/01/2018)  Echocardiography, transesophageal, real-time with image documentation (2D) (with or without M-mode recording); including probe placement, image acquisition, interpretation and report (07/31/2018)  Ultrasonography of Right and Left Heart, Transesophageal (07/31/2018)  Bone density scan (06/29/2018)  Cholecystectomy Laparoscopic (.) (04/27/2018)  Laparoscopy, surgical; cholecystectomy (04/27/2018)  Resection of Gallbladder, Percutaneous Endoscopic Approach (04/27/2018)  Esophagogastroduodenoscopy (04/06/2018)  Drainage of Abdomen Skin, External Approach (03/24/2018)  Incision and drainage of abscess (eg, carbuncle, suppurative hidradenitis, cutaneous or subcutaneous abscess, cyst, furuncle, or paronychia); simple or single (03/24/2018)  Catheter placement in coronary artery(s) for coronary angiography, including intraprocedural injection(s) for coronary angiography, imaging supervision and interpretation; (03/03/2017)  Fluoroscopy of  Multiple Coronary Arteries using Low Osmolar Contrast (03/03/2017)  Foot repair (2015)  Colonoscopy (08/31/2011)  Gastric bypass operation (10/01/2010)  Mammogram (06/29/1978)  Aortic Valve Repair/Replacement x 4  Cystoscope  FESS - Functional endoscopic sinus surgery  Laparoscopy  mechanical heart valve sx 2008  Percutaneous transluminal insertion of peripheral stent into artery  Tonsillectomy   Medications  cetirizine 10 mg oral tablet, See Instructions, 11 refills  eszopiclone 3 mg oral tablet, 3 mg= 1 tab(s), Oral, Once a day (at bedtime),? ?Not taking  hydrocortisone topical 2.5% cream, 1 shawna, TOP, BID, PRN  LORAZEPAM 0.5 MG TABLET, 0.5 mg= 1 tab(s), Oral, TID, PRN  mupirocin 2% topical ointment, 1 shawna, TOP, TID, 1 refills  ProAir HFA 90 mcg/inh inhalation aerosol with adapter, 2 puff(s), INH, q6hr, PRN  QUEtiapine 400 mg oral tablet, 400 mg= 1 tab(s), Oral, At Bedtime, 3 refills  rosuvastatin 20 mg oral tablet, See Instructions, 3 refills  sertraline 100 mg oral tablet, 150 mg= 1.5 tab(s), Oral, qPM, 3 refills  sucralfate 1 g oral tablet, See Instructions  warfarin 2 mg oral tablet, 2 mg= 1 tab(s), Oral, Daily  Allergies  Remeron?(nerves on fire)  niacin?(Tachycardia)  Social History  Abuse/Neglect  No, 03/29/2020  Alcohol  Current, Wine, 1-2 times per month, 2 drinks/episode average. 2 drinks/episode maximum. Started age 17 Years. Previous treatment: None. Alcohol use interferes with work or home: No. Drinks more than intended: No. Others hurt by drinking: No. Ready to change: No. Household alcohol concerns: No., 07/31/2018  Employment/School  Employed, 09/19/2016  Exercise  Exercise duration: 30. Exercise frequency: 1-2 times/week. Exercise type: Walking., 09/19/2016  Home/Environment  Lives with Children, Spouse. Living situation: Home/Independent., 07/20/2017  Nutrition/Health  Regular, 09/19/2016  Sexual  Sexually active: Yes., 09/19/2016  Substance Use  Never, 09/19/2016  Tobacco  Never (less than 100 in  lifetime), N/A, 03/29/2020  Family History  Bladder cancer.: Father.  Breast cancer.: Mother.  Primary malignant neoplasm of breast: Negative: Mother.  Health Maintenance  Health Maintenance  ???Pending?(in the next year)  ??? ??OverDue  ??? ? ? ?Coronary Artery Disease Maintenance-Lipid Lowering Therapy due??and every?  ??? ? ? ?Diabetes Screening due??and every?  ??? ??Due?  ??? ? ? ?ADL Screening due??03/29/20??and every 1??year(s)  ??? ? ? ?Hypertension Management-Education due??03/29/20??and every 1??year(s)  ??? ? ? ?Tetanus Vaccine due??03/29/20??and every 10??year(s)  ??? ??Due In Future?  ??? ? ? ?Hypertension Management-BMP not due until??11/12/20??and every 1??year(s)  ??? ? ? ?Smoking Cessation (Coronary Artery Disease) not due until??12/22/20??and every 2??year(s)  ??? ? ? ?Alcohol Misuse Screening not due until??01/01/21??and every 1??year(s)  ??? ? ? ?Obesity Screening not due until??01/01/21??and every 1??year(s)  ???Satisfied?(in the past 1 year)  ??? ??Satisfied?  ??? ? ? ?Alcohol Misuse Screening on??01/03/20.??Satisfied by Becca Todd  ??? ? ? ?Blood Pressure Screening on??03/29/20.??Satisfied by Quynh Tijerina  ??? ? ? ?Body Mass Index Check on??03/29/20.??Satisfied by Quynh Tijerina  ??? ? ? ?Breast Cancer Screening on??04/17/19.??Satisfied by Vaishnavi Lopez LPN  ??? ? ? ?Coronary Artery Disease Maintenance-Lipid Lowering Therapy on??03/20/20.??Satisfied by Priti Mesa MD  ??? ? ? ?Depression Screening on??01/03/20.??Satisfied by Becca Todd  ??? ? ? ?Diabetes Screening on??11/13/19.??Satisfied by Richard Rollins  ??? ? ? ?Hypertension Management-Blood Pressure on??03/29/20.??Satisfied by Quynh Tijerina  ??? ? ? ?Influenza Vaccine on??11/13/19.??Satisfied by Brian HOLLOWAY, Melva BROOKS  ??? ? ? ?Obesity Screening on??03/29/20.??Satisfied by Quynh Tijerina  ?

## 2022-05-02 NOTE — HISTORICAL [facility] CERNER
This is a historical note converted from Bisi. Formatting and pictures may have been removed.  Please reference Bisi for original formatting and attached multimedia. Chief Complaint  cough, congestion, wheezing, runny nose, sob, dizziness x 3- 4 day. swab for covid in March  History of Present Illness  50 yo F with hx of mechanical heart valve on Coumadin and Plavix, presents for concern of 4 days of cough, using albuterol and Flovent.? Neg COVID in March. No CP, SOB when coughing, no SANTIAGO.? Was around someone coughing about 3 weeks prior.?  Pt has been having similar symptoms over the last 3 months, on Levaquin, azithromycin, doxy, albuterol inhaler, also on Zyrtec daily. Did fell well for about 3 weeks.? Did not fill inhaled steroid.? Has follow up with cardiology.? No new leg swelling.  Review of Systems  Constitutional_negative for fever  ENMT_+rhinorrhea, + PND tickle in the throat  Respiratory_+ cough  Gastrointestinal_negative for nausea or vomiting  Integumentary_negative for rash  Physical Exam  Vitals & Measurements  T:?36.8? ?C (Oral)? HR:?71(Peripheral)? RR:?17? BP:?121/81? SpO2:?97%?  HT:?170?cm? WT:?104?kg? BMI:?35.99?  Gen: WD NAD  CV: S1S2 RRR no MRG, crisp click sound heard  Resp: wheeze throughout  HEENT: clear rhinorrhea, no cervical CJ, +PND, TMs without bulging or erythema bilaterally, no posterior pharyngeal erythema  Psych: Cooperative, approp mood and affect  Assessment/Plan  1.?Chest congestion?R09.89  ?X ray of the chest done today, per my review mucus noted throughout with some additional whitened area to LLL.  Will also call with final report.  Ordered:  albuterol, 2.5 mg = 3 mL, INH, q6hr, PRN PRN for wheezing, # 75 EA, 0 Refill(s), Pharmacy: [id number], 170, cm, Height/Length Dosing, 05/13/20 13:45:00 CDT, 104, kg, Weight Dosing, 05/13/20 13:45:00 CDT  cefTRIAXone, 1 gm, IM, Once-Unscheduled, first dose 05/13/20 14:27:00 CDT  doxycycline, 100 mg = 1 tab(s), Oral, BID, X 10  day(s), # 20 tab(s), 0 Refill(s), [id number], 170, cm, Height/Length Dosing, 05/13/20 13:45:00 CDT, 104, kg, Weight Dosing, 05/13/20 13:45:00 CDT  fluticasone, 2 puff(s), INH, BID, # 12 gm, 0 Refill(s), [id number], 170, cm, Height/Length Dosing, 05/13/20 13:45:00 CDT, 104, kg, Weight Dosing, 05/13/20 13:45:00 CDT  [facility] [place] Nebulizer Machine, For use with Albuterol, INH, q4hr, See instructions, # 1 EA, 0 Refill(s), [id number], 170, cm, Height/Length Dosing, 05/13/20 13:45:00 CDT, 104, kg, Weight Dosing, 05/13/20 13:45:00 CDT  predniSONE, 20 mg = 1 tab(s), Oral, BID, with food or milk, X 3 day(s), # 6 tab(s), 0 Refill(s), [id number], 170, cm, Height/Length Dosing, 05/13/20 13:45:00 CDT, 104, kg, Weight Dosing, 05/13/20 13:45:00 CDT  Office/Outpatient Visit Level 4 Established [id number] PC, Chest congestion  Acute bronchitis  Wheezing on both sides of chest, [facility] [place]-, 05/13/20 14:27:00 CDT  ?  2.?Acute bronchitis?J20.9  ?With history and exam will cover for infection with Rocephin injection and doxy, take with food. Also prednisone up to two a day also with food.?  ER for fever 100.4, continued shortness of breath, chest pain or other worsening symptoms.  Ordered:  cefTRIAXone, 1 gm, IM, Once-Unscheduled, first dose 05/13/20 14:27:00 CDT  doxycycline, 100 mg = 1 tab(s), Oral, BID, X 10 day(s), # 20 tab(s), 0 Refill(s), [id number], 170, cm, Height/Length Dosing, 05/13/20 13:45:00 CDT, 104, kg, Weight Dosing, 05/13/20 13:45:00 CDT  fluticasone, 2 puff(s), INH, BID, # 12 gm, 0 Refill(s), Pharmacy: [id number], 170, cm, Height/Length Dosing, 05/13/20 13:45:00 CDT, 104, kg, Weight Dosing, 05/13/20 13:45:00 CDT  predniSONE, 20 mg = 1 tab(s), Oral, BID, with food or milk, X 3 day(s), # 6 tab(s), 0 Refill(s), Pharmacy: [id number], 170, cm, Height/Length Dosing, 05/13/20 13:45:00 CDT, 104, kg, Weight Dosing, 05/13/20  13:45:00 CDT  Office/Outpatient Visit Level 4 Established [id number] PC, Chest congestion  Acute bronchitis  Wheezing on both sides of chest, [facility]-RR, 05/13/20 14:27:00 CDT  ?  3.?Wheezing on both sides of chest?R06.2  May need further lung check with CT of the chest, also recommend followup with Cardiology as these?symptoms when recurrent can be heart and/or lung related.  Ordered:  cefTRIAXone, 1 gm, IM, Once-Unscheduled, first dose 05/13/20 14:27:00 CDT  doxycycline, 100 mg = 1 tab(s), Oral, BID, X 10 day(s), # 20 tab(s), 0 Refill(s), Pharmacy: [facility] #[id number], 170, cm, Height/Length Dosing, 05/13/20 13:45:00 CDT, 104, kg, Weight Dosing, 05/13/20 13:45:00 CDT  fluticasone, 2 puff(s), INH, BID, # 12 gm, 0 Refill(s), Pharmacy: [id number], 170, cm, Height/Length Dosing, 05/13/20 13:45:00 CDT, 104, kg, Weight Dosing, 05/13/20 13:45:00 CDT  predniSONE, 20 mg = 1 tab(s), Oral, BID, with food or milk, X 3 day(s), # 6 tab(s), 0 Refill(s), Pharmacy: [id number], 170, cm, Height/Length Dosing, 05/13/20 13:45:00 CDT, 104, kg, Weight Dosing, 05/13/20 13:45:00 CDT  Office/Outpatient Visit Level 4 Established [id number] PC, Chest congestion  Acute bronchitis  Wheezing on both sides of chest, [facility]-RR, 05/13/20 14:27:00 CDT  ?   Problem List/Past Medical History  Ongoing  Anticoagulated on Coumadin  Anxiety  Depression  GERD - Gastro-esophageal reflux disease  HLD - Hyperlipidemia  Insomnia  Kidney stone  Mechanical heart valve present  Obesity  PAD - Peripheral arterial disease  Panic attack  Psoriatic arthritis  Pulmonary hypertension  Urge incontinence of urine  Historical  Acute sinusitis  Anxiety  CAD - Coronary artery disease  Depression  Gallbladder disease  GERD - Gastro-esophageal reflux disease  HTN - Hypertension  Insomnia  Kidney infection  Mechanical heart valve replacement  Obesity  Restless legs syndrome  Procedure/Surgical History  Cystoscopy w/Ureteroscopic Stone Extract (.)  (11/15/2019)  Cystourethroscopy, with ureteroscopy and/or pyeloscopy; with lithotripsy including insertion of indwelling ureteral stent (eg, Smith or double-J type) (11/15/2019)  Dilation of Left Ureter with Intraluminal Device, Via Natural or Artificial Opening Endoscopic (11/15/2019)  Fragmentation in Left Ureter, Via Natural or Artificial Opening Endoscopic (11/15/2019)  Colonoscopy (05/30/2019)  Mammogram (04/17/2019)  Total hysterectomy (01/22/2019)  Dilation of Left Common Iliac Vein with Intraluminal Device, Percutaneous Approach (08/31/2018)  Dilation of Right Common Iliac Vein with Intraluminal Device, Percutaneous Approach (08/31/2018)  Transcatheter placement of an intravascular stent(s), open or percutaneous, including radiological supervision and interpretation and including angioplasty within the same vessel, when performed; each additional vein (List separately in addition to code f (08/31/2018)  Transcatheter placement of an intravascular stent(s), open or percutaneous, including radiological supervision and interpretation and including angioplasty within the same vessel, when performed; initial vein (08/31/2018)  Ultrasonography of Bilateral Lower Extremity Veins, Intravascular (08/31/2018)  Catheter placement in coronary artery(s) for coronary angiography, including intraprocedural injection(s) for coronary angiography, imaging supervision and interpretation; with right and left heart catheterization including intraprocedural injection(s) fo (08/01/2018)  Fluoroscopy of Left Heart using Low Osmolar Contrast (08/01/2018)  Fluoroscopy of Multiple Coronary Arteries using Low Osmolar Contrast (08/01/2018)  Fluoroscopy of Thoracic Aorta using Low Osmolar Contrast (08/01/2018)  Injection procedure during cardiac catheterization including imaging supervision, interpretation, and report; for supravalvular aortography (List separately in addition to code for primary procedure) (08/01/2018)  Injection  procedure for extremity venography (including introduction of needle or intracatheter) (08/01/2018)  Intravascular ultrasound (noncoronary vessel) during diagnostic evaluation and/or therapeutic intervention, including radiological supervision and interpretation; each additional noncoronary vessel (List separately in addition to code for primary procedur (08/01/2018)  Intravascular ultrasound (noncoronary vessel) during diagnostic evaluation and/or therapeutic intervention, including radiological supervision and interpretation; initial noncoronary vessel (List separately in addition to code for primary procedure) (08/01/2018)  Left Heart Catheterization (08/01/2018)  Measurement of Cardiac Sampling and Pressure, Bilateral, Percutaneous Approach (08/01/2018)  AGUILAR (08/01/2018)  Ultrasonography of Bilateral Lower Extremity Veins, Intravascular (08/01/2018)  Venogram (08/01/2018)  Echocardiography, transesophageal, real-time with image documentation (2D) (with or without M-mode recording); including probe placement, image acquisition, interpretation and report (07/31/2018)  Ultrasonography of Right and Left Heart, Transesophageal (07/31/2018)  Bone density scan (06/29/2018)  Cholecystectomy Laparoscopic (.) (04/27/2018)  Laparoscopy, surgical; cholecystectomy (04/27/2018)  Resection of Gallbladder, Percutaneous Endoscopic Approach (04/27/2018)  Esophagogastroduodenoscopy (04/06/2018)  Drainage of Abdomen Skin, External Approach (03/24/2018)  Incision and drainage of abscess (eg, carbuncle, suppurative hidradenitis, cutaneous or subcutaneous abscess, cyst, furuncle, or paronychia); simple or single (03/24/2018)  Catheter placement in coronary artery(s) for coronary angiography, including intraprocedural injection(s) for coronary angiography, imaging supervision and interpretation; (03/03/2017)  Fluoroscopy of Multiple Coronary Arteries using Low Osmolar Contrast (03/03/2017)  Foot repair (2015)  Colonoscopy  (08/31/2011)  Gastric bypass operation (10/01/2010)  Mammogram (06/29/1978)  Aortic Valve Repair/Replacement x 4  Cystoscope  FESS - Functional endoscopic sinus surgery  Laparoscopy  mechanical heart valve sx 2008  Percutaneous transluminal insertion of peripheral stent into artery  Tonsillectomy   Medications  albuterol 0.083% inhalation solution, 2.5 mg= 3 mL, INH, q6hr, PRN  cetirizine 10 mg oral tablet, See Instructions, 11 refills  clopidogrel 75 mg oral tablet, 75 mg= 1 tab(s), Oral, Daily  eszopiclone 3 mg oral tablet, 3 mg= 1 tab(s), Oral, Once a day (at bedtime), 2 refills  Flovent HFA 44 mcg/inh inhalation aerosol, 2 puff(s), INH, BID  hydrocortisone topical 2.5% cream, 1 shawna, TOP, BID, PRN  LORAZEPAM 0.5 MG TABLET, 0.5 mg= 1 tab(s), Oral, TID, PRN  montelukast 10 mg oral TABLET, 10 mg= 1 tab(s), Oral, Daily  Nebulizer Machine, For use with Albuterol, INH, q4hr  ProAir HFA 90 mcg/inh inhalation aerosol with adapter, 2 puff(s), INH, q6hr, PRN  QUEtiapine 400 mg oral tablet, 400 mg= 1 tab(s), Oral, At Bedtime, 11 refills  rosuvastatin 20 mg oral tablet, See Instructions, 3 refills  sertraline 100 mg oral tablet, 150 mg= 1.5 tab(s), Oral, qPM, 11 refills  sucralfate 1 g oral tablet, See Instructions  warfarin 2 mg oral tablet, 2 mg= 1 tab(s), Oral, Daily  Allergies  Remeron?(nerves on fire)  niacin?(Tachycardia)  Social History  Abuse/Neglect  No, 05/13/2020  Alcohol  Current, Wine, 1-2 times per month, 2 drinks/episode average. 2 drinks/episode maximum. Started age 17 Years. Previous treatment: None. Alcohol use interferes with work or home: No. Drinks more than intended: No. Others hurt by drinking: No. Ready to change: No. Household alcohol concerns: No., 07/31/2018  Employment/School  Employed, 09/19/2016  Exercise  Exercise duration: 30. Exercise frequency: 1-2 times/week. Exercise type: Walking., 09/19/2016  Home/Environment  Lives with Children, Spouse. Living situation: Home/Independent.,  07/20/2017  Nutrition/Health  Regular, 09/19/2016  Sexual  Sexually active: Yes., 09/19/2016  Substance Use  Never, 09/19/2016  Tobacco  Never (less than 100 in lifetime), N/A, 05/13/2020  Family History  Bladder cancer.: Father.  Breast cancer.: Mother.  Primary malignant neoplasm of breast: Negative: Mother.  Health Maintenance  Health Maintenance  ???Pending?(in the next year)  ??? ??OverDue  ??? ? ? ?Coronary Artery Disease Maintenance-Lipid Lowering Therapy due??and every?  ??? ? ? ?Diabetes Screening due??and every?  ??? ??Due?  ??? ? ? ?ADL Screening due??05/13/20??and every 1??year(s)  ??? ? ? ?Hypertension Management-Education due??05/13/20??and every 1??year(s)  ??? ? ? ?Tetanus Vaccine due??05/13/20??and every 10??year(s)  ??? ??Due In Future?  ??? ? ? ?Hypertension Management-BMP not due until??11/12/20??and every 1??year(s)  ??? ? ? ?Smoking Cessation (Coronary Artery Disease) not due until??12/22/20??and every 2??year(s)  ??? ? ? ?Alcohol Misuse Screening not due until??01/01/21??and every 1??year(s)  ??? ? ? ?Obesity Screening not due until??01/01/21??and every 1??year(s)  ??? ? ? ?Blood Pressure Screening not due until??03/29/21??and every 1??year(s)  ??? ? ? ?Body Mass Index Check not due until??03/29/21??and every 1??year(s)  ??? ? ? ?Hypertension Management-Blood Pressure not due until??03/29/21??and every 1??year(s)  ???Satisfied?(in the past 1 year)  ??? ??Satisfied?  ??? ? ? ?Alcohol Misuse Screening on??01/03/20.??Satisfied by Becca Todd  ??? ? ? ?Blood Pressure Screening on??05/13/20.??Satisfied by El Arzate LPN.  ??? ? ? ?Body Mass Index Check on??05/13/20.??Satisfied by Quynh Tijerina  ??? ? ? ?Coronary Artery Disease Maintenance-Antiplatelet Agent Prescribed on??05/13/20.  ??? ? ? ?Depression Screening on??01/03/20.??Satisfied by Becca Todd  ??? ? ? ?Diabetes Screening on??11/13/19.??Satisfied by Richard Rollins  ??? ? ? ?Hypertension Management-Blood  Pressure on??05/13/20.??Satisfied by El Arzate LPN.  ??? ? ? ?Influenza Vaccine on??11/13/19.??Satisfied by Brian HOLLOWAY, Melva NESS.  ??? ? ? ?Obesity Screening on??05/13/20.??Satisfied by Quynh Tijerina  ?

## 2022-05-02 NOTE — HISTORICAL [facility] CERNER
This is a historical note converted from [employer]. Formatting and pictures may have been removed.  Please reference Bisi for original formatting and attached multimedia. Chief Complaint  body aches, fatigue, cough, sore throat, fever tmax 99.5 x 1 week ; fully vaccinated covid-19 03/2021  History of Present Illness  Pt?50 Years?White?Female?seen and evaluated in a limited status for concern for COVID-19. In order to decrease the risk of exposure to the hospital and health care workers, only a limited exam was done.  ?   Risk Factors Include  About 1 week of cough, fatigue, body aches pharyngitis?and elevated temperature?measured at home?maximum 99.5.? Patient reports?being fully vaccinated.? Pt noticing a burning/tight deep sensation of the lower neck/upper chest.  ?   Provider Precautions  N95 Mask,  ?  Covid Screening?  No confirmed close contact  No travel to high risk area  No Recent Testing Done  Review of Systems  Constitutional_negative for fever, pos elevated temp  ENMT_+rhinorrhea, + sinus pressure  Respiratory_+ cough  Gastrointestinal_negative for nausea or vomiting  Integumentary_negative for rash  Physical Exam  Vitals & Measurements  T:?36.7? ?C (Oral)? HR:?60(Peripheral)? RR:?16? BP:?104/65? SpO2:?100%?  ?  Gen: WD NAD  CV: S1S2, slight click sound of the valve with systolic murmur  Resp: CTA B  HEENT: clear rhinorrhea, no cervical CJ, +PND, TMs without bulging or erythema bilaterally, no posterior pharyngeal erythema  Psych: Cooperative, approp mood and affect  Assessment/Plan  1.?Cough?R05  ?COVID test negative  Flu test negative  Post Duoneb the anterior chest discomfort improved.  X ray of the chest done today, per my review and final concerning for left?lung opacity.  Ordered:  albuterol, 2.5 mg = 3 mL, INH, q6hr, PRN PRN for wheezing, # 60 EA, 0 Refill(s), Pharmacy: [id number], 170, cm, Height/Length Dosing, 04/27/21 16:12:00 CDT, 94.2, kg, Weight Dosing, 04/27/21 16:12:00  CDT  doxycycline, 100 mg = 1 tab(s), Oral, BID, X 10 day(s), # 20 tab(s), 0 Refill(s), Pharmacy: [facility]/pharmacy #[id number], 170, cm, Height/Length Dosing, 04/27/21 16:12:00 CDT, 94.2, kg, Weight Dosing, 04/27/21 16:12:00 CDT  COVID-19 POC IDnow [id number] PC, 09/08/21 9:09:00 CDT, UCC-RR  Flu A/B POC [id number] PC, 09/08/21 10:05:00 CDT, UCC-RR  ?  2.?Nasopharyngitis?J00  ?Use albuterol neb once every 4-6 hours for chest congestion. Force fluids, rest.? Tylenol for discomfort.? No work until fever free for at least 24 hours and overall better.? Ok to start with 3 days excuse.  Ordered:  albuterol, 2.5 mg = 3 mL, INH, q6hr, PRN PRN for wheezing, # 60 EA, 0 Refill(s), Pharmacy: [facility]/pharmacy #[id number], 170, cm, Height/Length Dosing, 04/27/21 16:12:00 CDT, 94.2, kg, Weight Dosing, 04/27/21 16:12:00 CDT  Flu A/B POC [id number] , 09/08/21 10:05:00 CDT, UCC-RR  ?  3.?Left upper lobe pneumonia?J18.9  ?Doxy with food. Recheck as needed.  Ordered:  doxycycline, 100 mg = 1 tab(s), Oral, BID, X 10 day(s), # 20 tab(s), 0 Refill(s), Pharmacy: [facility]/pharmacy #[id number], 170, cm, Height/Length Dosing, 04/27/21 16:12:00 CDT, 94.2, kg, Weight Dosing, 04/27/21 16:12:00 CDT  Flu A/B POC [id number] , 09/08/21 10:05:00 CDT, UCC-RR  ?   Problem List/Past Medical History  Ongoing  Anemia  Anticoagulated on Coumadin  Anxiety  Depression  GERD - Gastro-esophageal reflux disease  HLD - Hyperlipidemia  Hot flashes  Insomnia  Kidney stone  Mechanical heart valve present  Obesity  PAD - Peripheral arterial disease  Psoriatic arthritis  Pulmonary hypertension  Urge incontinence of urine  Vaginal laceration, old  Historical  Acute sinusitis  Anxiety  CAD - Coronary artery disease  Depression  Gallbladder disease  GERD - Gastro-esophageal reflux disease  HTN - Hypertension  Insomnia  Kidney infection  Mechanical heart valve replacement  Obesity  Panic attack  Restless legs syndrome  Procedure/Surgical History  Vaginal Repair Anterior & Posterior (.) (10/21/2020)  Colporrhaphy,  suture of injury of vagina (nonobstetrical) (10/20/2020)  Repair Vagina, Via Natural or Artificial Opening (10/20/2020)  Cystoscopy w/Ureteroscopic Stone Extract (.) (11/15/2019)  Cystourethroscopy, with ureteroscopy and/or pyeloscopy; with lithotripsy including insertion of indwelling ureteral stent (eg, Smith or double-J type) (11/15/2019)  Dilation of Left Ureter with Intraluminal Device, Via Natural or Artificial Opening Endoscopic (11/15/2019)  Fragmentation in Left Ureter, Via Natural or Artificial Opening Endoscopic (11/15/2019)  Colonoscopy (05/30/2019)  Mammogram (04/17/2019)  Total hysterectomy (01/22/2019)  Dilation of Left Common Iliac Vein with Intraluminal Device, Percutaneous Approach (08/31/2018)  Dilation of Right Common Iliac Vein with Intraluminal Device, Percutaneous Approach (08/31/2018)  Transcatheter placement of an intravascular stent(s), open or percutaneous, including radiological supervision and interpretation and including angioplasty within the same vessel, when performed; each additional vein (List separately in addition to code f (08/31/2018)  Transcatheter placement of an intravascular stent(s), open or percutaneous, including radiological supervision and interpretation and including angioplasty within the same vessel, when performed; initial vein (08/31/2018)  Ultrasonography of Bilateral Lower Extremity Veins, Intravascular (08/31/2018)  Catheter placement in coronary artery(s) for coronary angiography, including intraprocedural injection(s) for coronary angiography, imaging supervision and interpretation; with right and left heart catheterization including intraprocedural injection(s) fo (08/01/2018)  Fluoroscopy of Left Heart using Low Osmolar Contrast (08/01/2018)  Fluoroscopy of Multiple Coronary Arteries using Low Osmolar Contrast (08/01/2018)  Fluoroscopy of Thoracic Aorta using Low Osmolar Contrast (08/01/2018)  Injection procedure during cardiac catheterization including  imaging supervision, interpretation, and report; for supravalvular aortography (List separately in addition to code for primary procedure) (08/01/2018)  Injection procedure for extremity venography (including introduction of needle or intracatheter) (08/01/2018)  Intravascular ultrasound (noncoronary vessel) during diagnostic evaluation and/or therapeutic intervention, including radiological supervision and interpretation; each additional noncoronary vessel (List separately in addition to code for primary procedur (08/01/2018)  Intravascular ultrasound (noncoronary vessel) during diagnostic evaluation and/or therapeutic intervention, including radiological supervision and interpretation; initial noncoronary vessel (List separately in addition to code for primary procedure) (08/01/2018)  Left Heart Catheterization (08/01/2018)  Measurement of Cardiac Sampling and Pressure, Bilateral, Percutaneous Approach (08/01/2018)  AGUILAR (08/01/2018)  Ultrasonography of Bilateral Lower Extremity Veins, Intravascular (08/01/2018)  Venogram (08/01/2018)  Echocardiography, transesophageal, real-time with image documentation (2D) (with or without M-mode recording); including probe placement, image acquisition, interpretation and report (07/31/2018)  Ultrasonography of Right and Left Heart, Transesophageal (07/31/2018)  Bone density scan (06/29/2018)  Cholecystectomy Laparoscopic (.) (04/27/2018)  Laparoscopy, surgical; cholecystectomy (04/27/2018)  Resection of Gallbladder, Percutaneous Endoscopic Approach (04/27/2018)  Esophagogastroduodenoscopy (04/06/2018)  Drainage of Abdomen Skin, External Approach (03/24/2018)  Incision and drainage of abscess (eg, carbuncle, suppurative hidradenitis, cutaneous or subcutaneous abscess, cyst, furuncle, or paronychia); simple or single (03/24/2018)  Catheter placement in coronary artery(s) for coronary angiography, including intraprocedural injection(s) for coronary angiography, imaging supervision  and interpretation; (03/03/2017)  Fluoroscopy of Multiple Coronary Arteries using Low Osmolar Contrast (03/03/2017)  Foot repair (2015)  Colonoscopy (08/31/2011)  Gastric bypass operation (10/01/2010)  Mammogram (06/29/1978)  Aortic Valve Repair/Replacement x 4  Cystoscope  FESS - Functional endoscopic sinus surgery  Laparoscopy  mechanical heart valve sx 2008  Percutaneous transluminal insertion of peripheral stent into artery  Tonsillectomy   Medications  adalimumab 80 mg/0.8 mL-40 mg/0.4 mL subcutaneous kit  cetirizine 10 mg oral tablet, See Instructions, 11 refills  metoprolol succinate 25 mg oral tablet extended release, 25 mg= 1 tab(s), Oral, qPM, 11 refills  nebulizer, See Instructions  Premarin 0.3 mg oral tablet, 0.3 mg= 1 tab(s), Oral, Daily, 1 refills  QUEtiapine 400 mg oral tablet, 400 mg= 1 tab(s), Oral, At Bedtime, 11 refills  rosuvastatin 20 mg oral tablet, See Instructions, 11 refills  sertraline 100 mg oral tablet, 150 mg= 1.5 tab(s), Oral, qPM, 11 refills  sucralfate 1 g oral tablet, See Instructions  temazepam 15 mg oral capsule, 15 mg= 1 cap(s), Oral, qPM  warfarin 3 mg oral tablet, 3 mg= 1 tab(s), Oral, Daily  Allergies  Remeron?(nerves on fire)  niacin?(Tachycardia)  Social History  Abuse/Neglect  No, 09/08/2021  Alcohol  Current, Wine, 1-2 times per month, Alcohol use interferes with work or home: No. Others hurt by drinking: No. Household alcohol concerns: No., 01/13/2021  Employment/School  Employed, 09/19/2016  Exercise  Exercise duration: 30. Exercise frequency: 1-2 times/week. Exercise type: Walking., 09/19/2016  Home/Environment  Lives with Children, Spouse. Living situation: Home/Independent., 07/20/2017  Nutrition/Health  Regular, 09/19/2016  Sexual  Sexually active: Yes., 09/19/2016  Substance Use  Never, 09/19/2016  Tobacco  Never (less than 100 in lifetime), N/A, 09/08/2021  Family History  Bladder cancer.: Father.  Breast cancer.: Mother.  Primary malignant neoplasm of breast:  Negative: Mother.  Immunizations  Vaccine Date Status Comments   COVID-19 mRNA, LNP-S, PF - Moderna 03/30/2021 Recorded    COVID-19 mRNA, LNP-S, PF - Moderna 03/01/2021 Recorded    influenza virus vaccine, inactivated 10/30/2020 Given Early/Late Reason:  Nursing Judgment   tetanus/diphtheria/pertussis, acel(Tdap) 07/06/2020 Given    influenza virus vaccine, inactivated 10/02/2018 Recorded    Health Maintenance  Health Maintenance  ???Pending?(in the next year)  ??? ??OverDue  ??? ? ? ?Hypertension Management-Education due??07/06/21??and every 1??year(s)  ??? ??Due In Future?  ??? ? ? ?Obesity Screening not due until??01/01/22??and every 1??year(s)  ??? ? ? ?Alcohol Misuse Screening not due until??01/02/22??and every 1??year(s)  ??? ? ? ?ADL Screening not due until??01/13/22??and every 1??year(s)  ??? ? ? ?Blood Pressure Screening not due until??04/27/22??and every 1??year(s)  ??? ? ? ?Body Mass Index Check not due until??04/27/22??and every 1??year(s)  ??? ? ? ?Hypertension Management-Blood Pressure not due until??04/27/22??and every 1??year(s)  ??? ? ? ?Hypertension Management-BMP not due until??06/11/22??and every 1??year(s)  ???Satisfied?(in the past 1 year)  ??? ??Satisfied?  ??? ? ? ?ADL Screening on??01/13/21.??Satisfied by Megan Young LPN  ??? ? ? ?Alcohol Misuse Screening on??04/27/21.??Satisfied by Vandana House  ??? ? ? ?Blood Pressure Screening on??09/08/21.??Satisfied by Oneyda Morataya LPN  ??? ? ? ?Body Mass Index Check on??04/27/21.??Satisfied by Vandana House  ??? ? ? ?Coronary Artery Disease Maintenance-Lipid Lowering Therapy on??04/27/21.??Satisfied by Priti Mesa MD  ??? ? ? ?Depression Screening on??04/27/21.??Satisfied by Vandana House  ??? ? ? ?Diabetes Screening on??06/11/21.??Satisfied by Frankie Carlson  ??? ? ? ?Hypertension Management-Blood Pressure on??09/08/21.??Satisfied by Oneyda Morataya LPN  ??? ? ? ?Influenza Vaccine on??12/09/20.??Satisfied by Frankie Dent  A.  ??? ? ? ?Obesity Screening on??04/27/21.??Satisfied by Vandana House  ?

## 2022-05-02 NOTE — HISTORICAL [facility] CERNER
This is a historical note converted from [employer]. Formatting and pictures may have been removed.  Please reference Bisi for original formatting and attached multimedia. Chief Complaint  Right elbow pain and swelling for 3 weeks and is swollen and tender feels like its muscle denies truama  History of Present Illness  50-year-old present to clinic with concerns of right elbow pain since 3 weeks. ?No fall no trauma. ?Has tried over-the-counter medications,?topical ice and heat?and brace.? States not much help.? Denies tingling numbness or weakness to upper extremities.  Review of Systems  Constitutional : No fever, no fatigue  Neck : Negative except HPI  Respiratory : No shortness of breath, no wheezing  Cardiovascular : No chest pain  Musculoskeletal : Negative except HPI  Integumentary : No rash, no abnormal lesion  Neurological : Negative for tingling numbness and weakness  Physical Exam  Vitals & Measurements  T:?36.7? ?C (Oral)? HR:?54(Peripheral)? RR:?17? BP:?128/82? SpO2:?100%?  HT:?170.00?cm? WT:?90.000?kg? BMI:?31.14?  General : Alert and oriented, No apparent distress, Afebrile  Neck -: supple, Non Tender  Respiratory :Lungs are clear to auscultate, Breath sounds are equal  Cardiovascular : Normal rate, normal volume pulse bilateral  Musculoskeletal : Right elbow?laterally?mild fullness, tender to palpate. ?No visible redness or?bruising.? Elbow range of motion present.? Right wrist and fingers range of motion present.? Radial pulse 2+  Integumentary :?Warm, Dry?  Neurologic : Alert and Oriented X 4, sensations intact, motor intact  Assessment/Plan  1.?Lateral epicondylitis, right elbow?M77.11  ?Reviewed the x-rays right elbow, no acute finding.? Radiology final results will be monitored and reported  Discussed?the physical findings, clinical diagnosis of?epicondylitis.? Topical ice 3-4 times a day.? Topical medication as directed  Tylenol every 6 hours as needed for pain  Treatment options  discussed?including trigger point injection. ?Call or return to clinic for any questions. ?Follow-up with primary M.D.  Elbow brace to keep the joint in neutral position?including nighttime  Ordered:  diclofenac topical, 1 shawna, TOP, QID, PRN PRN as needed for pain, not to exceed 8 grams/day/single joint of upper extremities, # 100 gm, 0 Refill(s), Pharmacy: [facility]/pharmacy #[id number], 170, cm, Height/Length Dosing, 12/09/20 17:16:00 CST, 90, kg, Weight Dosing, 12/09/20 17:16:...  Office/Outpatient Visit Level 3 Established [id number] PC, Lateral epicondylitis, right elbow, UCC-RR, 12/09/20 17:50:00 CST  ?  2.?Right elbow pain?M25.521  ?   Problem List/Past Medical History  Ongoing  Anemia  Anticoagulated on Coumadin  Anxiety  Depression  GERD - Gastro-esophageal reflux disease  HLD - Hyperlipidemia  Hot flashes  Insomnia  Kidney stone  Mechanical heart valve present  Obesity  PAD - Peripheral arterial disease  Psoriatic arthritis  Pulmonary hypertension  Urge incontinence of urine  Vaginal laceration, old  Historical  Acute sinusitis  Anxiety  CAD - Coronary artery disease  Depression  Gallbladder disease  GERD - Gastro-esophageal reflux disease  HTN - Hypertension  Insomnia  Kidney infection  Mechanical heart valve replacement  Obesity  Panic attack  Restless legs syndrome  Procedure/Surgical History  Vaginal Repair Anterior & Posterior (.) (10/21/2020)  Colporrhaphy, suture of injury of vagina (nonobstetrical) (10/20/2020)  Repair Vagina, Via Natural or Artificial Opening (10/20/2020)  Cystoscopy w/Ureteroscopic Stone Extract (.) (11/15/2019)  Cystourethroscopy, with ureteroscopy and/or pyeloscopy; with lithotripsy including insertion of indwelling ureteral stent (eg, Smith or double-J type) (11/15/2019)  Dilation of Left Ureter with Intraluminal Device, Via Natural or Artificial Opening Endoscopic (11/15/2019)  Fragmentation in Left Ureter, Via Natural or Artificial Opening Endoscopic (11/15/2019)  Colonoscopy  (05/30/2019)  Mammogram (04/17/2019)  Total hysterectomy (01/22/2019)  Dilation of Left Common Iliac Vein with Intraluminal Device, Percutaneous Approach (08/31/2018)  Dilation of Right Common Iliac Vein with Intraluminal Device, Percutaneous Approach (08/31/2018)  Transcatheter placement of an intravascular stent(s), open or percutaneous, including radiological supervision and interpretation and including angioplasty within the same vessel, when performed; each additional vein (List separately in addition to code f (08/31/2018)  Transcatheter placement of an intravascular stent(s), open or percutaneous, including radiological supervision and interpretation and including angioplasty within the same vessel, when performed; initial vein (08/31/2018)  Ultrasonography of Bilateral Lower Extremity Veins, Intravascular (08/31/2018)  Catheter placement in coronary artery(s) for coronary angiography, including intraprocedural injection(s) for coronary angiography, imaging supervision and interpretation; with right and left heart catheterization including intraprocedural injection(s) fo (08/01/2018)  Fluoroscopy of Left Heart using Low Osmolar Contrast (08/01/2018)  Fluoroscopy of Multiple Coronary Arteries using Low Osmolar Contrast (08/01/2018)  Fluoroscopy of Thoracic Aorta using Low Osmolar Contrast (08/01/2018)  Injection procedure during cardiac catheterization including imaging supervision, interpretation, and report; for supravalvular aortography (List separately in addition to code for primary procedure) (08/01/2018)  Injection procedure for extremity venography (including introduction of needle or intracatheter) (08/01/2018)  Intravascular ultrasound (noncoronary vessel) during diagnostic evaluation and/or therapeutic intervention, including radiological supervision and interpretation; each additional noncoronary vessel (List separately in addition to code for primary procedur (08/01/2018)  Intravascular ultrasound  (noncoronary vessel) during diagnostic evaluation and/or therapeutic intervention, including radiological supervision and interpretation; initial noncoronary vessel (List separately in addition to code for primary procedure) (08/01/2018)  Left Heart Catheterization (08/01/2018)  Measurement of Cardiac Sampling and Pressure, Bilateral, Percutaneous Approach (08/01/2018)  AGUILAR (08/01/2018)  Ultrasonography of Bilateral Lower Extremity Veins, Intravascular (08/01/2018)  Venogram (08/01/2018)  Echocardiography, transesophageal, real-time with image documentation (2D) (with or without M-mode recording); including probe placement, image acquisition, interpretation and report (07/31/2018)  Ultrasonography of Right and Left Heart, Transesophageal (07/31/2018)  Bone density scan (06/29/2018)  Cholecystectomy Laparoscopic (.) (04/27/2018)  Laparoscopy, surgical; cholecystectomy (04/27/2018)  Resection of Gallbladder, Percutaneous Endoscopic Approach (04/27/2018)  Esophagogastroduodenoscopy (04/06/2018)  Drainage of Abdomen Skin, External Approach (03/24/2018)  Incision and drainage of abscess (eg, carbuncle, suppurative hidradenitis, cutaneous or subcutaneous abscess, cyst, furuncle, or paronychia); simple or single (03/24/2018)  Catheter placement in coronary artery(s) for coronary angiography, including intraprocedural injection(s) for coronary angiography, imaging supervision and interpretation; (03/03/2017)  Fluoroscopy of Multiple Coronary Arteries using Low Osmolar Contrast (03/03/2017)  Foot repair (2015)  Colonoscopy (08/31/2011)  Gastric bypass operation (10/01/2010)  Mammogram (06/29/1978)  Aortic Valve Repair/Replacement x 4  Cystoscope  FESS - Functional endoscopic sinus surgery  Laparoscopy  mechanical heart valve sx 2008  Percutaneous transluminal insertion of peripheral stent into artery  Tonsillectomy   Medications  cetirizine 10 mg oral tablet, See Instructions, 11 refills  diclofenac 1% topical gel, 1 shawna,  TOP, QID, PRN  DuoNeb 0.5 mg-2.5 mg/3 mL inhalation solution, 3 mL, INH, QID, PRN, 11 refills  Flovent  mcg/inh inhalation aerosol, 2 puff(s), INH, BID  Lunesta 3 mg oral tablet, 3 mg= 1 tab(s), Oral, Once a day (at bedtime), PRN, 2 refills  metoprolol succinate 25 mg oral tablet, extended release, 25 mg= 1 tab(s), Oral, qPM  nebulizer, See Instructions  Premarin 0.3 mg oral tablet, 0.3 mg= 1 tab(s), Oral, Daily, 11 refills  QUEtiapine 400 mg oral tablet, 400 mg= 1 tab(s), Oral, At Bedtime, 11 refills  rosuvastatin 20 mg oral tablet, See Instructions  sertraline 100 mg oral tablet, 150 mg= 1.5 tab(s), Oral, qPM, 11 refills  sucralfate 1 g oral tablet, See Instructions  Tylenol, 1000 mg= 2 tab(s), Oral, q6hr, PRN  warfarin 1 mg oral tablet, 3 mg= 3 tab(s), Oral, Daily  Allergies  Remeron?(nerves on fire)  niacin?(Tachycardia)  Social History  Abuse/Neglect  No, 12/09/2020  Alcohol  Current, Wine, 1-2 times per month, 2 drinks/episode average. 2 drinks/episode maximum. Started age 17 Years. Previous treatment: None. Alcohol use interferes with work or home: No. Drinks more than intended: No. Others hurt by drinking: No. Ready to change: No. Household alcohol concerns: No., 07/31/2018  Employment/School  Employed, 09/19/2016  Exercise  Exercise duration: 30. Exercise frequency: 1-2 times/week. Exercise type: Walking., 09/19/2016  Home/Environment  Lives with Children, Spouse. Living situation: Home/Independent., 07/20/2017  Nutrition/Health  Regular, 09/19/2016  Sexual  Sexually active: Yes., 09/19/2016  Substance Use  Never, 09/19/2016  Tobacco  Never (less than 100 in lifetime), N/A, 12/09/2020  Family History  Bladder cancer.: Father.  Breast cancer.: Mother.  Primary malignant neoplasm of breast: Negative: Mother.  Immunizations  Vaccine Date Status Comments   influenza virus vaccine, inactivated 10/30/2020 Given Early/Late Reason:  Nursing Judgment   tetanus/diphtheria/pertussis, acel(Tdap) 07/06/2020 Given     Health Maintenance  Health Maintenance  ???Pending?(in the next year)  ???There are no current recommendations pending  ??? ??Due In Future?  ??? ? ? ?Obesity Screening not due until??01/01/21??and every 1??year(s)  ??? ? ? ?Alcohol Misuse Screening not due until??01/02/21??and every 1??year(s)  ??? ? ? ?Hypertension Management-Education not due until??07/06/21??and every 1??year(s)  ??? ? ? ?ADL Screening not due until??07/06/21??and every 1??year(s)  ??? ? ? ?Influenza Vaccine not due until??10/01/21??and every 1??day(s)  ??? ? ? ?Hypertension Management-BMP not due until??10/31/21??and every 1??year(s)  ???Satisfied?(in the past 1 year)  ??? ??Satisfied?  ??? ? ? ?ADL Screening on??07/06/20.??Satisfied by Vandana House  ??? ? ? ?Alcohol Misuse Screening on??01/03/20.??Satisfied by Becca Todd  ??? ? ? ?Blood Pressure Screening on??12/09/20.??Satisfied by Afshan RASCON, Frankie A.  ??? ? ? ?Body Mass Index Check on??12/09/20.??Satisfied by Afshan RASCON, Frankie A.  ??? ? ? ?Coronary Artery Disease Maintenance-Antiplatelet Agent Prescribed on??10/31/20.??Satisfied by Robinson Moon MD  ??? ? ? ?Depression Screening on??10/27/20.??Satisfied by Vandana House  ??? ? ? ?Diabetes Screening on??10/31/20.??Satisfied by Karen Harry  ??? ? ? ?Hypertension Management-Blood Pressure on??12/09/20.??Satisfied by Afshan RASCON, Frankie A.  ??? ? ? ?Influenza Vaccine on??12/09/20.??Satisfied by AfshanFrankie Peck  ??? ? ? ?Obesity Screening on??12/09/20.??Satisfied by Frankie Dent  ??? ? ? ?Tetanus Vaccine on??07/06/20.??Satisfied by Vandana House  ?

## 2022-05-02 NOTE — HISTORICAL [facility] CERNER
This is a historical note converted from [employer]ner. Formatting and pictures may have been removed.  Please reference [employer]ner for original formatting and attached multimedia. Chief Complaint  Fever, body aches, cough, sore throat x 1 week.  History of Present Illness  49-year-old female with 1 week of?subjective fever, myalgias,?had a scratchy throat over the last several days,?mostly dry cough with pleuritic chest pain bilaterally.? No abdominal pain.? No vomiting or diarrhea.  Review of Systems  Constitutional: negative except as stated in HPI  Eye: negative except as stated in HPI  ENT: negative except as stated in HPI  Respiratory: negative except as stated in HPI  Cardiovascular: negative except as stated in HPI  Gastrointestinal: negative except as stated in HPI  Genitourinary: negative except as stated in HPI  Physical Exam  Vitals & Measurements  T:?37.4? ?C (Oral)? HR:?83(Peripheral)? RR:?18? BP:?150/90? SpO2:?96%?  HT:?170?cm? WT:?106?kg? BMI:?36.68?  VITAL SIGNS: ?Reviewed. ? ?  GENERAL: In no apparent distress  HEAD:? No signs of head trauma  EYES: Pupils are equal.? Extraocular motions intact  EARS: EACs without erythema.? TMs clear bilaterally  NOSE: Clear. No sinus tenderness  MOUTH:?Postnasal drip, otherwise clear  NECK: No adenopathy, no JVD??  CHEST: Clear breath sounds bilaterally.? No wheezes  CARDIAC: Regular rate and rhythm  ABDOMEN:?Soft, nontender  ?  ?  ?  ?(03/05/2020 15:11 CST XR Chest 2 Views)  Reason For Exam  Cough  ?  Radiology Report  ?  CHEST X-RAYS (2 Views):  ?  CPT: 86826  ?  HISTORY:  Cough  ?  FINDINGS:  Examination reveals mediastinal and cardiac silhouettes to be within  normal limits. Tiny more confluent interstitial and airspace opacities  in the left perihilar region early infiltrate cannot be completely  excluded  ?  IMPRESSION: Slightly more confluent airspace opacities in the left  perihilar region early infiltrate cannot be completely excluded  ?  Signature  Line  Electronically Signed By: Molina Do MD  Date/Time Signed: 03/05/2020 15:17 [1]  Assessment/Plan  1.?CAP (community acquired pneumonia)?J18.9  Ordered:  Office/Outpatient Visit Level 5 New 30893 PC, CAP (community acquired pneumonia), 03/05/20 15:41:00 CST  ?  Orders:  levoFLOXacin, 750 mg = 1 tab(s), Oral, q24hr, X 7 day(s), # 7 tab(s), 0 Refill(s), Pharmacy: [facility]/pharmacy #[id number], 170, cm, Height/Length Dosing, 03/05/20 14:04:00 CST, 106, kg, Weight Dosing, 03/05/20 14:04:00 CST  We will give a course of Levaquin, patient will?notify Coumadin clinic in [place] that she is?on an antibiotic that may affect her?INR. ?She states she will call them today.? Encouraged fluids, rest, monitor condition closely and return to urgent care or ER?if not improving in 2 to 3 days or?any new or worsening symptoms.   Problem List/Past Medical History  Ongoing  Anticoagulated on Coumadin  Anxiety  Depression  GERD - Gastro-esophageal reflux disease  Insomnia  Kidney infection  Kidney stone  Mechanical heart valve present  Obesity  Panic attack  Psoriatic arthritis  Urge incontinence of urine  Historical  Acute sinusitis  Anxiety  CAD - Coronary artery disease  Depression  Gallbladder disease  GERD - Gastro-esophageal reflux disease  HLD - Hyperlipidemia  HTN - Hypertension  Insomnia  Mechanical heart valve replacement  Obesity  PAD - Peripheral arterial disease  Pulmonary hypertension  Restless legs syndrome  Procedure/Surgical History  Cystoscopy w/Ureteroscopic Stone Extract (.) (11/15/2019)  Cystourethroscopy, with ureteroscopy and/or pyeloscopy; with lithotripsy including insertion of indwelling ureteral stent (eg, Smith or double-J type) (11/15/2019)  Dilation of Left Ureter with Intraluminal Device, Via Natural or Artificial Opening Endoscopic (11/15/2019)  Fragmentation in Left Ureter, Via Natural or Artificial Opening Endoscopic (11/15/2019)  Colonoscopy (05/30/2019)  Mammogram (04/17/2019)  Total hysterectomy  (01/22/2019)  Dilation of Left Common Iliac Vein with Intraluminal Device, Percutaneous Approach (08/31/2018)  Dilation of Right Common Iliac Vein with Intraluminal Device, Percutaneous Approach (08/31/2018)  Transcatheter placement of an intravascular stent(s), open or percutaneous, including radiological supervision and interpretation and including angioplasty within the same vessel, when performed; each additional vein (List separately in addition to code f (08/31/2018)  Transcatheter placement of an intravascular stent(s), open or percutaneous, including radiological supervision and interpretation and including angioplasty within the same vessel, when performed; initial vein (08/31/2018)  Ultrasonography of Bilateral Lower Extremity Veins, Intravascular (08/31/2018)  Catheter placement in coronary artery(s) for coronary angiography, including intraprocedural injection(s) for coronary angiography, imaging supervision and interpretation; with right and left heart catheterization including intraprocedural injection(s) fo (08/01/2018)  Fluoroscopy of Left Heart using Low Osmolar Contrast (08/01/2018)  Fluoroscopy of Multiple Coronary Arteries using Low Osmolar Contrast (08/01/2018)  Fluoroscopy of Thoracic Aorta using Low Osmolar Contrast (08/01/2018)  Injection procedure during cardiac catheterization including imaging supervision, interpretation, and report; for supravalvular aortography (List separately in addition to code for primary procedure) (08/01/2018)  Injection procedure for extremity venography (including introduction of needle or intracatheter) (08/01/2018)  Intravascular ultrasound (noncoronary vessel) during diagnostic evaluation and/or therapeutic intervention, including radiological supervision and interpretation; each additional noncoronary vessel (List separately in addition to code for primary procedur (08/01/2018)  Intravascular ultrasound (noncoronary vessel) during diagnostic evaluation and/or  therapeutic intervention, including radiological supervision and interpretation; initial noncoronary vessel (List separately in addition to code for primary procedure) (08/01/2018)  Left Heart Catheterization (08/01/2018)  Measurement of Cardiac Sampling and Pressure, Bilateral, Percutaneous Approach (08/01/2018)  AGUILAR (08/01/2018)  Ultrasonography of Bilateral Lower Extremity Veins, Intravascular (08/01/2018)  Venogram (08/01/2018)  Echocardiography, transesophageal, real-time with image documentation (2D) (with or without M-mode recording); including probe placement, image acquisition, interpretation and report (07/31/2018)  Ultrasonography of Right and Left Heart, Transesophageal (07/31/2018)  Bone density scan (06/29/2018)  Cholecystectomy Laparoscopic (.) (04/27/2018)  Laparoscopy, surgical; cholecystectomy (04/27/2018)  Resection of Gallbladder, Percutaneous Endoscopic Approach (04/27/2018)  Esophagogastroduodenoscopy (04/06/2018)  Drainage of Abdomen Skin, External Approach (03/24/2018)  Incision and drainage of abscess (eg, carbuncle, suppurative hidradenitis, cutaneous or subcutaneous abscess, cyst, furuncle, or paronychia); simple or single (03/24/2018)  Catheter placement in coronary artery(s) for coronary angiography, including intraprocedural injection(s) for coronary angiography, imaging supervision and interpretation; (03/03/2017)  Fluoroscopy of Multiple Coronary Arteries using Low Osmolar Contrast (03/03/2017)  Foot repair (2015)  Colonoscopy (08/31/2011)  Gastric bypass operation (10/01/2010)  Mammogram (06/29/1978)  Aortic Valve Repair/Replacement x 4  Cystoscope  FESS - Functional endoscopic sinus surgery  Laparoscopy  mechanical heart valve sx 2008  Percutaneous transluminal insertion of peripheral stent into artery  Tonsillectomy   Medications  cetirizine 10 mg oral tablet, See Instructions, 11 refills  hydrocortisone topical 2.5% cream, 1 shawna, TOP, BID, PRN  Levaquin 750 mg oral tablet, 750 mg=  1 tab(s), Oral, q24hr  LORAZEPAM 0.5 MG TABLET, 0.5 mg= 1 tab(s), Oral, TID, PRN  Lunesta 3 mg oral tablet, 3 mg= 1 tab(s), Oral, Once a day (at bedtime), PRN  mupirocin 2% topical ointment, 1 shawna, TOP, TID, 1 refills  Plavix 75 mg oral tablet, 75 mg= 1 tab(s), Oral, Daily, 5 refills,? ?Not taking: Last Dose Date/Time Unknown  Premarin 0.9 mg oral tablet, 0.9 mg= 1 tab(s), Oral, Daily, 2 refills,? ?Not taking  QUEtiapine 400 mg oral tablet, 400 mg= 1 tab(s), Oral, At Bedtime, 3 refills  rosuvastatin 20 mg oral tablet, See Instructions, 2 refills  sertraline 100 mg oral tablet, 150 mg= 1.5 tab(s), Oral, qPM, 3 refills  sucralfate 1 g oral tablet, 1 gm= 1 tab(s), Oral, QIDACHS, 1 refills  warfarin 2 mg oral tablet, 2 mg= 1 tab(s), Oral, Daily  Allergies  Remeron?(nerves on fire)  niacin?(Tachycardia)  Social History  Abuse/Neglect  No, 11/15/2019  No, 11/13/2019  Alcohol  Current, Wine, 1-2 times per month, 2 drinks/episode average. 2 drinks/episode maximum. Started age 17 Years. Previous treatment: None. Alcohol use interferes with work or home: No. Drinks more than intended: No. Others hurt by drinking: No. Ready to change: No. Household alcohol concerns: No., 07/31/2018  Employment/School  Employed, 09/19/2016  Exercise  Exercise duration: 30. Exercise frequency: 1-2 times/week. Exercise type: Walking., 09/19/2016  Home/Environment  Lives with Children, Spouse. Living situation: Home/Independent., 07/20/2017  Nutrition/Health  Regular, 09/19/2016  Sexual  Sexually active: Yes., 09/19/2016  Substance Use  Never, 09/19/2016  Tobacco  Never (less than 100 in lifetime), N/A, 11/15/2019  Never (less than 100 in lifetime), N/A, 11/13/2019  Family History  Bladder cancer.: Father.  Breast cancer.: Mother.  Primary malignant neoplasm of breast: Negative: Mother.  Health Maintenance  Health Maintenance  ???Pending?(in the next year)  ??? ??OverDue  ??? ? ? ?Coronary Artery Disease Maintenance-Lipid Lowering Therapy due??and  every?  ??? ? ? ?Diabetes Screening due??and every?  ??? ??Due?  ??? ? ? ?ADL Screening due??03/05/20??and every 1??year(s)  ??? ? ? ?Tetanus Vaccine due??03/05/20??and every 10??year(s)  ??? ??Due In Future?  ??? ? ? ?Hypertension Management-BMP not due until??11/12/20??and every 1??year(s)  ??? ? ? ?Smoking Cessation (Coronary Artery Disease) not due until??12/22/20??and every 2??year(s)  ??? ? ? ?Alcohol Misuse Screening not due until??01/01/21??and every 1??year(s)  ??? ? ? ?Obesity Screening not due until??01/01/21??and every 1??year(s)  ???Satisfied?(in the past 1 year)  ??? ??Satisfied?  ??? ? ? ?Alcohol Misuse Screening on??01/03/20.??Satisfied by Becca Todd  ??? ? ? ?Blood Pressure Screening on??03/05/20.??Satisfied by Jean Wheeler  ??? ? ? ?Body Mass Index Check on??03/05/20.??Satisfied by Jean Wheeler  ??? ? ? ?Breast Cancer Screening on??04/17/19.??Satisfied by Vaishnavi Lopez LPN  ??? ? ? ?Depression Screening on??01/03/20.??Satisfied by Becca Todd  ??? ? ? ?Diabetes Screening on??11/13/19.??Satisfied by Richard Rollins  ??? ? ? ?Hypertension Management-Blood Pressure on??03/05/20.??Satisfied by Jean Wheeler  ??? ? ? ?Influenza Vaccine on??11/13/19.??Satisfied by Melva Torres RN  ??? ? ? ?Obesity Screening on??03/05/20.??Satisfied by Jean Wheeler  ?  Lab Results  Test Name Test Result Date/Time   Influenza A POC Negative 03/05/2020 14:17 CST   Influenza B POC Negative 03/05/2020 14:17 CST   Rapid Strep POC Negative 03/05/2020 14:16 CST      [1]?XR Chest 2 Views; oMlina Do MD 03/05/2020 15:11 CST

## 2022-05-04 ENCOUNTER — HOSPITAL ENCOUNTER (EMERGENCY)
Facility: HOSPITAL | Age: 52
Discharge: LEFT BEFORE DISCHARGE | End: 2022-05-04
Attending: FAMILY MEDICINE
Payer: COMMERCIAL

## 2022-05-04 VITALS
SYSTOLIC BLOOD PRESSURE: 154 MMHG | HEART RATE: 92 BPM | HEIGHT: 65 IN | DIASTOLIC BLOOD PRESSURE: 81 MMHG | RESPIRATION RATE: 16 BRPM | WEIGHT: 198.44 LBS | BODY MASS INDEX: 33.06 KG/M2

## 2022-05-04 DIAGNOSIS — M79.606 LEG PAIN: ICD-10-CM

## 2022-05-04 DIAGNOSIS — M25.569 KNEE PAIN: ICD-10-CM

## 2022-05-04 PROBLEM — I82.403 DVT, BILATERAL LOWER LIMBS: Status: ACTIVE | Noted: 2022-05-04

## 2022-05-04 PROCEDURE — 25000003 PHARM REV CODE 250: Performed by: PHYSICIAN ASSISTANT

## 2022-05-04 PROCEDURE — 99283 EMERGENCY DEPT VISIT LOW MDM: CPT

## 2022-05-04 RX ORDER — HYDROCODONE BITARTRATE AND ACETAMINOPHEN 7.5; 325 MG/1; MG/1
1 TABLET ORAL ONCE
Status: DISCONTINUED | OUTPATIENT
Start: 2022-05-04 | End: 2022-05-04

## 2022-05-04 RX ORDER — HYDROCODONE BITARTRATE AND ACETAMINOPHEN 7.5; 325 MG/1; MG/1
1 TABLET ORAL
Status: COMPLETED | OUTPATIENT
Start: 2022-05-04 | End: 2022-05-04

## 2022-05-04 RX ADMIN — HYDROCODONE BITARTRATE AND ACETAMINOPHEN 1 TABLET: 7.5; 325 TABLET ORAL at 02:05

## 2022-05-04 NOTE — ED NOTES
When provider explained to pt that she would need to be transferred to [facility] for her ultrasound, pt stated she would just like to sign out AMA.  AMA form signed by JONATHAN Boyle and myself.  Because pt was given narcotic pain medication in the ED, we called her a UBER ride home and explained to the patient that she couldn't drive.  Pt voiced understanding she is not to drive herself home.   Pt waiting in lobby by security for ride

## 2022-05-04 NOTE — ED NOTES
"Pt dressed her self and attempted to leave the ED.  Pt states "I can lay at my house in pain if nothing is going to be done for me".  Explained to the patient that we are just waiting for US to come check her leg, and that if she needs more pain medications I can ask for some.  Pt stated that she just wants to leave. Explained to pt that if she wants to leave she must have a responsible party come get her  "

## 2022-05-04 NOTE — ED PROVIDER NOTES
"Encounter Date: 5/4/2022       History     Chief Complaint   Patient presents with    Leg Pain     51 y.o. female presents to the ED with acute left lower leg pain that starts in the knee and radiates down into the calf and ankle onset PTA. States she was standing from a seated position when she felt multiple "pops" behind the left knee and felt the pain radiate down into the lower leg. 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 Cardiologist is Dr. Hoyos. Currently on coumadin with good levels. Denies fall or trauma to the leg, chest pain or SOB. States she tripped over her cat a few days ago and twisted her knee, however says the pain had been tolerable until today.       The history is provided by the patient. No  was used.     Review of patient's allergies indicates:   Allergen Reactions    Niacin Rash    Remeron [mirtazapine] Anxiety and Other (See Comments)     Causes nerves to be on fire like fibromyalgia pain.     Past Medical History:   Diagnosis Date    DVT, bilateral lower limbs      No past surgical history on file.  No family history on file.        Review of Systems   Constitutional: Negative for chills and fever.   HENT: Negative for sore throat.    Eyes: Negative for visual disturbance.   Respiratory: Negative for cough and shortness of breath.    Cardiovascular: Negative for chest pain.   Gastrointestinal: Negative for abdominal pain, nausea and vomiting.   Genitourinary: Negative for dysuria.   Musculoskeletal: Positive for myalgias. Negative for arthralgias and back pain.        Knee pain   Skin: Negative for color change and rash.   Neurological: Negative for dizziness, weakness and headaches.   Hematological: Does not bruise/bleed easily.   Psychiatric/Behavioral: Negative for behavioral problems.   All other systems reviewed and are negative.      Physical Exam     Initial Vitals [05/04/22 1426]   BP Pulse Resp Temp SpO2   (!) 154/81 92 (!) 22 -- -- " "     MAP       --         Physical Exam    ED Course   Procedures  Labs Reviewed - No data to display       Imaging Results          X-Ray Knee 3 View Left (Final result)  Result time 05/04/22 15:32:04    Final result by Iker Chi MD (05/04/22 15:32:04)                 Impression:      No acute osseous abnormality of the left knee.      Electronically signed by: Iker Chi  Date:    05/04/2022  Time:    15:32             Narrative:    EXAMINATION:  XR KNEE 3 VIEW LEFT    CLINICAL HISTORY:  Pain in unspecified knee    TECHNIQUE:  AP, lateral, and sunrise views of the left knee.    COMPARISON:  None available.    FINDINGS:  No acute fracture or malalignment is identified of the left knee. There is no suprapatellar joint effusion of the left knee.  Minimal medial compartment joint space narrowing.  The joint spaces of the left knee are otherwise maintained.  There is no aggressive-appearing bone lesion or abnormal periosteal reaction. No soft tissue gas or calcification. Bone mineralization is maintained.                                 Medications   HYDROcodone-acetaminophen 7.5-325 mg per tablet 1 tablet (1 tablet Oral Given 5/4/22 1456)                 ED Course as of 05/04/22 1747   Wed May 04, 2022   1520 Patient sitting in bed, comfortable. Notes relief in pain after PO norco, discussed that we are waiting on  US at this time. Patient agreeable [MA]   1546 X-Ray Knee 3 View Left [MA]   1637 RN notified me that patient got herself fully dressed and is stating that she wants to leave and is hurting again. I discussed with her that we are waiting for US to come by for the study hwoever she keeps repeating "I can sit at home in pain for free." Told her that we can give her something else for pain however she cannot leave without a ride due to having had norco here in the ED [MA]   1742 US order did not cross over in the system at the time it was placed (4462); was later notified that we do not have on czll " vascular and if patient needed NIVA, would need to be transferred to [facility]. Patient refused and decided to leave [place]. I discussed with patient for a second time that she cannot drive home on her own accord since taking Norco here in the ED to which she was going to get a ride. Now stating she will get Uber  home. All risks were discussed with patient including PE and ultimate death. She voiced understanding and is stable at this time.  [MA]      ED Course User Index  [MA] Jaret Boyle PA-C             Clinical Impression:   Final diagnoses:  [M25.569] Knee pain  [M79.606] Leg pain          ED Disposition Condition    AMA               Jaret Boyle PA-C  05/04/22 [id number]

## 2022-05-04 NOTE — LETTER
Patient: Holly Starr  YOB: 1970  Date: 5/4/2022 Time: 5:48 PM  Location: [facility] Emergency Dept    Leaving the Hospital Against Medical Advice    Chart #:[id number]    This will certify that I, the undersigned,    __Holly Starr______________________________________    A patient in the above named medical center, having requested discharge and removal from the medical center against the advice of my attending physician(s), hereby release [facility], its physicians, officers and employees, severally and individually, from any and all liability of any nature whatsoever for any injury or harm or complication of any kind that may result directly or indirectly, by reason of my terminating my stay as a patient at [facility] Emergency [facility] and my departure from [facility], and hereby waive any and all rights of action I may now have or later acquire as a result of my voluntary departure from [facility] and the termination of my stay as a patient therein.    This release is made with the full knowledge of the danger that may result from the action which I am taking.      Date:_______________________                         ___________________________                                                                                    Patient/Legal Representative    Witness:        ____________________________                          ___________________________  Nurse                                                                        Physician

## 2022-05-14 NOTE — OP NOTE
Patient:   Holly Starr            MRN: [id number]            FIN: [id number]               Age:   51 years     Sex:  Female     :  1970   Associated Diagnoses:   None   Author:   Mj Roper MD      SURGEON:  Mj Roper MD    PREOPERATIVE DIAGNOSIS(ES):  Left ureteral calculus.    POSTOPERATIVE DIAGNOSIS(ES):  Left ureteral calculus.    PROCEDURE(S)/OPERATION(S) PERFORMED:  1. Left ureteroscopy with Holmium laser lithotripsy.  2. Left double-J ureteral stent placement.    ANESTHESIA:  General.    FLUIDS ADMINISTERED:  1000 mL crystalloid.    ESTIMATED BLOOD LOSS:  2 mL.    SPECIMENS:  Left renal stone sent for stone analysis.    FINDINGS:  The patient was endoscopically and radiographically stone free at the end of the case.    COMPLICATIONS:  None.    DRAINS:  6-Marshallese x 26cm left double-J ureteral stent.    SUMMARY:  After informed consent was obtained, the patient was brought to the operating room and placed  in the supine position. After adequate general anesthetic, the patient was positioned in dorsal  lithotomy and genitalia prepped and draped in a sterile manner. A time-out was performed with  the patient identified using 2 identifiers and the procedure site verified. The 21-Marshallese rigid  cystoscope was advanced into the bladder and the bladder was drained. The bladder was  thoroughly examined with 30 and 70-degree lenses. The ureteral orifices were in the normal  position.  We advanced a Glidewire up the lumen of the  ureter  and renal pelvis under fluoroscopic guidance. Next we passed a 12/14 Marshallese Cook Flexor ureteral access sheath over the working wire  and into the ureter under fluoroscopic guidance. It passed easily to the level of the mid ureter. The  inner dilator and working wire were then removed.    We then advanced a digital flexible ureteroscope into the access sheath and examined  the ureter, renal pelvis, and calyces. We visualized stone in the upper ureter. We  used a 273 micron  Holmium laser fiber to fragment the stone with settings of 0.3 Joules and 50 pulses per second.     The stone was fragmented into fragments small  enough to remove through the access sheath. The fragments were then removed with a with a  6 Icelandic Cook Inneractiveicle stone basket and removed. Once all fragments had been removed, we  examined all the calyces and renal pelvis again twice and no residual fragments were seen  endoscopically or radiographically. We injected 5 mL of contrast to opacify the renal pelvis, and  then removed the ureteroscope and access sheath. The ureter was carefully examined as we  removed the scope and there was no evidence of injury, perforation, or retained calculus.      We elected to leave an indwelling double-J stent to prevent obstruction due to edema or spasm.  The cystoscope was replaced into the bladder and the 6-Icelandic open-ended ureteral catheter  and Glidewire were advanced back into the ureter. We measured the ureter from the UPJ to the  UO and it measured 26 cm. We then deployed a double-J stent in a standard fashion with the  proximal curl in the renal pelvis and the distal curl in the bladder. We left an external stent  tether. The bladder was drained with the cystoscope and the cystoscope was removed. The  patient tolerated the procedure well, was extubated, and transported to the recovery room in  stable condition. The family was informed of the outcome following the procedure.    Plan:  Stent with string may be remove in 5-7 days.

## 2022-05-27 ENCOUNTER — OFFICE VISIT (OUTPATIENT)
Dept: FAMILY MEDICINE | Facility: CLINIC | Age: 52
End: 2022-05-27
Payer: COMMERCIAL

## 2022-05-27 VITALS
RESPIRATION RATE: 16 BRPM | WEIGHT: 207 LBS | TEMPERATURE: 98 F | SYSTOLIC BLOOD PRESSURE: 103 MMHG | DIASTOLIC BLOOD PRESSURE: 66 MMHG | HEART RATE: 63 BPM | HEIGHT: 65 IN | BODY MASS INDEX: 34.49 KG/M2 | OXYGEN SATURATION: 98 %

## 2022-05-27 DIAGNOSIS — M25.562 ACUTE PAIN OF LEFT KNEE: Primary | ICD-10-CM

## 2022-05-27 PROCEDURE — 99213 OFFICE O/P EST LOW 20 MIN: CPT | Mod: ,,, | Performed by: NURSE PRACTITIONER

## 2022-05-27 PROCEDURE — 1159F PR MEDICATION LIST DOCUMENTED IN MEDICAL RECORD: ICD-10-PCS | Mod: CPTII,,, | Performed by: NURSE PRACTITIONER

## 2022-05-27 PROCEDURE — 1159F MED LIST DOCD IN RCRD: CPT | Mod: CPTII,,, | Performed by: NURSE PRACTITIONER

## 2022-05-27 PROCEDURE — 99213 PR OFFICE/OUTPT VISIT, EST, LEVL III, 20-29 MIN: ICD-10-PCS | Mod: ,,, | Performed by: NURSE PRACTITIONER

## 2022-05-27 PROCEDURE — 3078F PR MOST RECENT DIASTOLIC BLOOD PRESSURE < 80 MM HG: ICD-10-PCS | Mod: CPTII,,, | Performed by: NURSE PRACTITIONER

## 2022-05-27 PROCEDURE — 1160F PR REVIEW ALL MEDS BY PRESCRIBER/CLIN PHARMACIST DOCUMENTED: ICD-10-PCS | Mod: CPTII,,, | Performed by: NURSE PRACTITIONER

## 2022-05-27 PROCEDURE — 3008F BODY MASS INDEX DOCD: CPT | Mod: CPTII,,, | Performed by: NURSE PRACTITIONER

## 2022-05-27 PROCEDURE — 3074F PR MOST RECENT SYSTOLIC BLOOD PRESSURE < 130 MM HG: ICD-10-PCS | Mod: CPTII,,, | Performed by: NURSE PRACTITIONER

## 2022-05-27 PROCEDURE — 3008F PR BODY MASS INDEX (BMI) DOCUMENTED: ICD-10-PCS | Mod: CPTII,,, | Performed by: NURSE PRACTITIONER

## 2022-05-27 PROCEDURE — 3074F SYST BP LT 130 MM HG: CPT | Mod: CPTII,,, | Performed by: NURSE PRACTITIONER

## 2022-05-27 PROCEDURE — 1160F RVW MEDS BY RX/DR IN RCRD: CPT | Mod: CPTII,,, | Performed by: NURSE PRACTITIONER

## 2022-05-27 PROCEDURE — 3078F DIAST BP <80 MM HG: CPT | Mod: CPTII,,, | Performed by: NURSE PRACTITIONER

## 2022-05-27 RX ORDER — METHOCARBAMOL 750 MG/1
1500 TABLET, FILM COATED ORAL 3 TIMES DAILY
COMMUNITY
Start: 2022-02-07 | End: 2022-10-19 | Stop reason: SDUPTHER

## 2022-05-27 RX ORDER — WARFARIN 3 MG/1
3 TABLET ORAL DAILY
COMMUNITY

## 2022-05-27 RX ORDER — CLOBETASOL PROPIONATE 0.46 MG/ML
0.05 SOLUTION TOPICAL DAILY PRN
COMMUNITY
Start: 2021-12-09

## 2022-05-27 RX ORDER — FERROUS SULFATE 325(65) MG
325 TABLET, DELAYED RELEASE (ENTERIC COATED) ORAL DAILY
COMMUNITY
Start: 2021-12-01

## 2022-05-27 RX ORDER — HYOSCYAMINE SULFATE 0.12 MG/1
0.12 TABLET SUBLINGUAL DAILY PRN
COMMUNITY
Start: 2022-02-07

## 2022-05-27 RX ORDER — ALBUTEROL SULFATE 0.83 MG/ML
2.5 SOLUTION RESPIRATORY (INHALATION) DAILY PRN
COMMUNITY
Start: 2021-09-08 | End: 2022-11-30

## 2022-05-27 RX ORDER — ATORVASTATIN CALCIUM 40 MG/1
40 TABLET, FILM COATED ORAL DAILY
COMMUNITY
Start: 2022-05-02 | End: 2022-10-19

## 2022-05-27 RX ORDER — SUCRALFATE 1 G/10ML
1 SUSPENSION ORAL DAILY PRN
COMMUNITY
Start: 2022-02-07

## 2022-05-27 NOTE — PROGRESS NOTES
"Subjective:      Patient ID: Holly Starr is a 51 y.o. White female      Chief Complaint: Follow-up (Hosp f/u knee and leg pain)       Past Medical History:   Diagnosis Date    Anemia     Anticoagulated on Coumadin     Anxiety     Depression     DVT, bilateral lower limbs     GERD (gastroesophageal reflux disease)     Hyperlipidemia     Insomnia     Kidney stone     Mechanical heart valve present     PAD (peripheral artery disease)     Psoriatic arthritis     Psoriatic arthritis     Pulmonary HTN     Urge incontinence         HPI  Presents to clinic for ED follow up.  Seen in ED with complaints of left knee pain s/t fall (tripped of cat).  XR left knee WNL.  States pain and edema have improved, but she still has some pain  when she" turns."  Denies any edema.  Not currently taking any medications.  Denies any other problems.       Review of Systems   Constitutional: Negative.  Negative for appetite change, chills and fever.   HENT: Negative.    Eyes: Negative.  Negative for discharge and redness.   Respiratory: Negative.  Negative for shortness of breath.    Cardiovascular: Negative.  Negative for chest pain.   Gastrointestinal: Negative.    Endocrine: Negative.    Genitourinary: Negative.    Musculoskeletal: Positive for arthralgias (left knee pain).   Integumentary:  Negative.   Allergic/Immunologic: Negative.    Neurological: Negative.    Psychiatric/Behavioral: Negative.    All other systems reviewed and are negative.         Objective:      Vitals:    05/27/22 0821   BP: 103/66   Pulse: 63   Resp: 16   Temp: 97.9 °F (36.6 °C)      Body mass index is 34.45 kg/m².     Physical Exam  Vitals reviewed.   Constitutional:       Appearance: Normal appearance.   HENT:      Head: Normocephalic.      Mouth/Throat:      Mouth: Mucous membranes are moist.   Eyes:      Conjunctiva/sclera: Conjunctivae normal.      Pupils: Pupils are equal, round, and reactive to light.   Cardiovascular:      Rate and " Rhythm: Normal rate and regular rhythm.   Pulmonary:      Effort: Pulmonary effort is normal. No respiratory distress.      Breath sounds: Normal breath sounds.   Musculoskeletal:         General: Normal range of motion.      Cervical back: Normal range of motion and neck supple.      Comments: Full ROM left knee.  No edema.  No crepitus.    Skin:     General: Skin is warm and dry.   Neurological:      Mental Status: She is alert and oriented to person, place, and time.   Psychiatric:         Mood and Affect: Mood normal.            Current Outpatient Medications:     albuterol (PROVENTIL) 2.5 mg /3 mL (0.083 %) nebulizer solution, Inhale 2.5 mg into the lungs daily as needed., Disp: , Rfl:     atorvastatin (LIPITOR) 40 MG tablet, Take 40 mg by mouth once daily., Disp: , Rfl:     clobetasoL (TEMOVATE) 0.05 % external solution, Apply 0.05 application topically daily as needed., Disp: , Rfl:     enoxaparin (LOVENOX) 100 mg/mL Syrg, Inject 100 mg into the skin once daily. (for 7 days.), Disp: , Rfl:     estradioL (ESTRACE) 0.5 MG tablet, Take 0.5 mg by mouth once daily., Disp: , Rfl:     ferrous sulfate 325 (65 FE) MG EC tablet, Take 325 mg by mouth once daily at 6am., Disp: , Rfl:     furosemide (LASIX) 40 MG tablet, Take 40 mg by mouth once daily., Disp: , Rfl:     hyoscyamine (LEVSIN/SL) 0.125 mg Subl, Place 0.125 mg under the tongue daily as needed., Disp: , Rfl:     methocarbamoL (ROBAXIN) 750 MG Tab, Take 1,500 mg by mouth 3 (three) times daily., Disp: , Rfl:     metoprolol succinate (TOPROL-XL) 25 MG 24 hr tablet, Take 25 mg by mouth once daily., Disp: , Rfl:     QUEtiapine (SEROQUEL) 400 MG tablet, Take 400 mg by mouth every evening., Disp: , Rfl:     rosuvastatin (CRESTOR) 20 MG tablet, Take 20 mg by mouth every evening., Disp: , Rfl:     sertraline (ZOLOFT) 100 MG tablet, Take 200 mg by mouth once daily., Disp: , Rfl:     sucralfate (CARAFATE) 100 mg/mL suspension, Take 1 g by mouth daily as  needed., Disp: , Rfl:     temazepam (RESTORIL) 15 mg Cap, Take 15 mg by mouth nightly as needed., Disp: , Rfl:     warfarin (COUMADIN) 3 MG tablet, Take 3 mg by mouth once daily., Disp: , Rfl:     Assessment & Plan:     Problem List Items Addressed This Visit        Orthopedic    Acute pain of left knee - Primary     Discussed MRI; declines  States edema subsided, pain improving  Instructed to obtain knee brace  OTC NSAIDS prn  Instructed to contact me if she decides to proceed with MRI  Verbalizes understanding

## 2022-05-27 NOTE — ASSESSMENT & PLAN NOTE
Discussed MRI; declines  States edema subsided, pain improving  Instructed to obtain knee brace  OTC NSAIDS prn  Instructed to contact me if she decides to proceed with MRI  Verbalizes understanding

## 2022-06-19 ENCOUNTER — OFFICE VISIT (OUTPATIENT)
Dept: URGENT CARE | Facility: CLINIC | Age: 52
End: 2022-06-19
Payer: COMMERCIAL

## 2022-06-19 VITALS
WEIGHT: 207 LBS | TEMPERATURE: 98 F | HEART RATE: 87 BPM | BODY MASS INDEX: 34.49 KG/M2 | OXYGEN SATURATION: 99 % | SYSTOLIC BLOOD PRESSURE: 116 MMHG | HEIGHT: 65 IN | DIASTOLIC BLOOD PRESSURE: 76 MMHG

## 2022-06-19 DIAGNOSIS — M79.674 GREAT TOE PAIN, RIGHT: Primary | ICD-10-CM

## 2022-06-19 PROCEDURE — 99214 OFFICE O/P EST MOD 30 MIN: CPT | Mod: ,,, | Performed by: FAMILY MEDICINE

## 2022-06-19 PROCEDURE — 1159F MED LIST DOCD IN RCRD: CPT | Mod: CPTII,,, | Performed by: FAMILY MEDICINE

## 2022-06-19 PROCEDURE — 3074F PR MOST RECENT SYSTOLIC BLOOD PRESSURE < 130 MM HG: ICD-10-PCS | Mod: CPTII,,, | Performed by: FAMILY MEDICINE

## 2022-06-19 PROCEDURE — 3078F DIAST BP <80 MM HG: CPT | Mod: CPTII,,, | Performed by: FAMILY MEDICINE

## 2022-06-19 PROCEDURE — 3008F PR BODY MASS INDEX (BMI) DOCUMENTED: ICD-10-PCS | Mod: CPTII,,, | Performed by: FAMILY MEDICINE

## 2022-06-19 PROCEDURE — 1159F PR MEDICATION LIST DOCUMENTED IN MEDICAL RECORD: ICD-10-PCS | Mod: CPTII,,, | Performed by: FAMILY MEDICINE

## 2022-06-19 PROCEDURE — 3008F BODY MASS INDEX DOCD: CPT | Mod: CPTII,,, | Performed by: FAMILY MEDICINE

## 2022-06-19 PROCEDURE — 3074F SYST BP LT 130 MM HG: CPT | Mod: CPTII,,, | Performed by: FAMILY MEDICINE

## 2022-06-19 PROCEDURE — 3078F PR MOST RECENT DIASTOLIC BLOOD PRESSURE < 80 MM HG: ICD-10-PCS | Mod: CPTII,,, | Performed by: FAMILY MEDICINE

## 2022-06-19 PROCEDURE — 99214 PR OFFICE/OUTPT VISIT, EST, LEVL IV, 30-39 MIN: ICD-10-PCS | Mod: ,,, | Performed by: FAMILY MEDICINE

## 2022-06-20 ENCOUNTER — TELEPHONE (OUTPATIENT)
Dept: URGENT CARE | Facility: CLINIC | Age: 52
End: 2022-06-20
Payer: COMMERCIAL

## 2022-06-20 NOTE — TELEPHONE ENCOUNTER
Explained final xray results to pt and pt verbalized understanding           ----- Message from Cristina Cabrera MD sent at 6/19/2022  3:38 PM CDT -----  Please notify pt that final x ray also negative for fracture.  Continue plan and follow up as needed.

## 2022-07-19 ENCOUNTER — LAB VISIT (OUTPATIENT)
Dept: LAB | Facility: HOSPITAL | Age: 52
End: 2022-07-19
Attending: DERMATOLOGY
Payer: COMMERCIAL

## 2022-07-19 DIAGNOSIS — Z79.899 ENCOUNTER FOR LONG-TERM (CURRENT) USE OF OTHER MEDICATIONS: ICD-10-CM

## 2022-07-19 DIAGNOSIS — L40.0 PSORIASIS VULGARIS: Primary | ICD-10-CM

## 2022-07-19 LAB
ALBUMIN SERPL-MCNC: 4.3 GM/DL (ref 3.5–5)
ALBUMIN/GLOB SERPL: 1.2 RATIO (ref 1.1–2)
ALP SERPL-CCNC: 122 UNIT/L (ref 40–150)
ALT SERPL-CCNC: 54 UNIT/L (ref 0–55)
ANISOCYTOSIS BLD QL SMEAR: ABNORMAL
AST SERPL-CCNC: 66 UNIT/L (ref 5–34)
BASOPHILS # BLD AUTO: 0.02 X10(3)/MCL (ref 0–0.2)
BASOPHILS NFR BLD AUTO: 0.4 %
BILIRUBIN DIRECT+TOT PNL SERPL-MCNC: 0.5 MG/DL
BUN SERPL-MCNC: 9 MG/DL (ref 7–18.7)
CALCIUM SERPL-MCNC: 10.3 MG/DL (ref 8.4–10.2)
CHLORIDE SERPL-SCNC: 108 MMOL/L (ref 98–107)
CO2 SERPL-SCNC: 27 MMOL/L (ref 22–29)
CREAT SERPL-MCNC: 0.82 MG/DL (ref 0.55–1.02)
EOSINOPHIL # BLD AUTO: 0.07 X10(3)/MCL (ref 0–0.9)
EOSINOPHIL NFR BLD AUTO: 1.4 %
ERYTHROCYTE [DISTWIDTH] IN BLOOD BY AUTOMATED COUNT: 21.9 % (ref 11.5–17)
GLOBULIN SER-MCNC: 3.7 GM/DL (ref 2.4–3.5)
GLUCOSE SERPL-MCNC: 86 MG/DL (ref 74–100)
HCT VFR BLD AUTO: 35.5 % (ref 37–47)
HGB BLD-MCNC: 10.4 GM/DL (ref 12–16)
HYPOCHROMIA BLD QL SMEAR: ABNORMAL
IMM GRANULOCYTES # BLD AUTO: 0 X10(3)/MCL (ref 0–0.04)
IMM GRANULOCYTES NFR BLD AUTO: 0 %
LYMPHOCYTES # BLD AUTO: 1.93 X10(3)/MCL (ref 0.6–4.6)
LYMPHOCYTES NFR BLD AUTO: 39.8 %
MCH RBC QN AUTO: 22.6 PG (ref 27–31)
MCHC RBC AUTO-ENTMCNC: 29.3 MG/DL (ref 33–36)
MCV RBC AUTO: 77.2 FL (ref 80–94)
MONOCYTES # BLD AUTO: 0.33 X10(3)/MCL (ref 0.1–1.3)
MONOCYTES NFR BLD AUTO: 6.8 %
NEUTROPHILS # BLD AUTO: 2.5 X10(3)/MCL (ref 2.1–9.2)
NEUTROPHILS NFR BLD AUTO: 51.6 %
NRBC BLD AUTO-RTO: 0 %
PLATELET # BLD AUTO: 262 X10(3)/MCL (ref 130–400)
PLATELET # BLD EST: ADEQUATE 10*3/UL
PMV BLD AUTO: 9.5 FL (ref 7.4–10.4)
POTASSIUM SERPL-SCNC: 3.9 MMOL/L (ref 3.5–5.1)
PROT SERPL-MCNC: 8 GM/DL (ref 6.4–8.3)
RBC # BLD AUTO: 4.6 X10(6)/MCL (ref 4.2–5.4)
RBC MORPH BLD: ABNORMAL
SODIUM SERPL-SCNC: 145 MMOL/L (ref 136–145)
WBC # SPEC AUTO: 4.9 X10(3)/MCL (ref 4.5–11.5)

## 2022-07-19 PROCEDURE — 86480 TB TEST CELL IMMUN MEASURE: CPT

## 2022-07-19 PROCEDURE — 80053 COMPREHEN METABOLIC PANEL: CPT

## 2022-07-19 PROCEDURE — 85025 COMPLETE CBC W/AUTO DIFF WBC: CPT

## 2022-07-19 PROCEDURE — 36415 COLL VENOUS BLD VENIPUNCTURE: CPT

## 2022-07-21 LAB
GAMMA INTERFERON BACKGROUND BLD IA-ACNC: 0.03 IU/ML
M TB IFN-G BLD-IMP: NEGATIVE
M TB IFN-G CD4+ BCKGRND COR BLD-ACNC: 0.02 IU/ML
M TB IFN-G CD4+CD8+ BCKGRND COR BLD-ACNC: 0 IU/ML
MITOGEN IGNF BCKGRD COR BLD-ACNC: 9.97 IU/ML

## 2022-08-29 ENCOUNTER — OFFICE VISIT (OUTPATIENT)
Dept: URGENT CARE | Facility: CLINIC | Age: 52
End: 2022-08-29
Payer: COMMERCIAL

## 2022-08-29 VITALS
RESPIRATION RATE: 20 BRPM | SYSTOLIC BLOOD PRESSURE: 149 MMHG | DIASTOLIC BLOOD PRESSURE: 87 MMHG | HEIGHT: 67 IN | BODY MASS INDEX: 32.9 KG/M2 | WEIGHT: 209.63 LBS | HEART RATE: 66 BPM | OXYGEN SATURATION: 100 % | TEMPERATURE: 98 F

## 2022-08-29 DIAGNOSIS — L08.9 SKIN INFECTION: ICD-10-CM

## 2022-08-29 DIAGNOSIS — H66.91 RIGHT OTITIS MEDIA, UNSPECIFIED OTITIS MEDIA TYPE: Primary | ICD-10-CM

## 2022-08-29 PROCEDURE — 99213 OFFICE O/P EST LOW 20 MIN: CPT | Mod: S$PBB,,, | Performed by: NURSE PRACTITIONER

## 2022-08-29 PROCEDURE — 99215 OFFICE O/P EST HI 40 MIN: CPT | Mod: PBBFAC | Performed by: NURSE PRACTITIONER

## 2022-08-29 PROCEDURE — 99213 PR OFFICE/OUTPT VISIT, EST, LEVL III, 20-29 MIN: ICD-10-PCS | Mod: S$PBB,,, | Performed by: NURSE PRACTITIONER

## 2022-08-29 PROCEDURE — 87077 CULTURE AEROBIC IDENTIFY: CPT | Performed by: NURSE PRACTITIONER

## 2022-08-29 RX ORDER — AMOXICILLIN 875 MG/1
875 TABLET, FILM COATED ORAL 2 TIMES DAILY
Qty: 20 TABLET | Refills: 0 | Status: SHIPPED | OUTPATIENT
Start: 2022-08-29 | End: 2022-09-08

## 2022-08-29 RX ORDER — MUPIROCIN 20 MG/G
OINTMENT TOPICAL
Qty: 22 G | Refills: 1 | Status: SHIPPED | OUTPATIENT
Start: 2022-08-29 | End: 2022-10-19

## 2022-08-29 NOTE — PROGRESS NOTES
"Subjective:       Patient ID: Holly Starr is a 51 y.o. female.    Vitals:  height is 5' 7" (1.702 m) and weight is 95.1 kg (209 lb 9.6 oz). Her temperature is 97.8 °F (36.6 °C). Her blood pressure is 149/87 (abnormal) and her pulse is 66. Her respiration is 20 and oxygen saturation is 100%.     Chief Complaint: Ear Fullness (Right ), Adenopathy (Right neck ), and Infection (Pt think she has "staph" in belly button)    Patient is a 51-year-old female, here today for right ear fullness, states the gland in her right neck has been swollen in this started on Friday.  Patient also states she thinks she has an infection in her belly button, states she has been having some drainage from the area.  Currently using Neosporin to the area with little relief.      Constitution: Negative.   HENT:  Positive for ear pain.    Neck: Positive for neck swelling.   Cardiovascular: Negative.    Respiratory: Negative.     Musculoskeletal:         Drainage from umbilical area     Objective:      Physical Exam   Constitutional: She is oriented to person, place, and time. She appears well-developed.   HENT:   Head: Normocephalic.   Ears:   Right Ear: A middle ear effusion is present.   Left Ear: Tympanic membrane normal.   Eyes: Conjunctivae and EOM are normal. Pupils are equal, round, and reactive to light.   Neck: Neck supple.       Cardiovascular: Normal rate, regular rhythm and normal heart sounds.   Pulmonary/Chest: Effort normal and breath sounds normal.   Musculoskeletal: Normal range of motion.         General: Normal range of motion.   Neurological: She is alert and oriented to person, place, and time.   Skin: Skin is warm and dry.         Comments: Erythema to umbilical area, scant purulent drainage   Psychiatric: Her behavior is normal.   Vitals reviewed.      Assessment:       1. Right otitis media, unspecified otitis media type    2. Skin infection                 No results found.   Plan:         Medication as ordered. May " use humidifier.  If any shortness of breath, wheezing, continued fevers or any new symptoms then immediately go to ER.  Culture done of umbilical drainage, will notify of abnormal results.    Right otitis media, unspecified otitis media type  -     amoxicillin (AMOXIL) 875 MG tablet; Take 1 tablet (875 mg total) by mouth 2 (two) times daily. for 10 days  Dispense: 20 tablet; Refill: 0    Skin infection  -     mupirocin (BACTROBAN) 2 % ointment; Apply to affected area 3 times daily  Dispense: 22 g; Refill: 1  -     Wound Culture

## 2022-08-31 ENCOUNTER — TELEPHONE (OUTPATIENT)
Dept: FAMILY MEDICINE | Facility: CLINIC | Age: 52
End: 2022-08-31
Payer: COMMERCIAL

## 2022-08-31 DIAGNOSIS — H92.09 OTALGIA, UNSPECIFIED LATERALITY: Primary | ICD-10-CM

## 2022-08-31 NOTE — TELEPHONE ENCOUNTER
----- Message from HEIDE Laughlin sent at 8/31/2022 10:31 AM CDT -----  Regarding: RE: referral  Needs appt for referral; may be telemedicine  thanks  ----- Message -----  From: Kristy Linares LPN  Sent: 8/31/2022  10:26 AM CDT  To: HEIDE Laughlin  Subject: FW: referral                                     Please review.  ----- Message -----  From: Yahaira Phoenix  Sent: 8/31/2022  10:20 AM CDT  To: [facility] Staff  Subject: referral                                         Type:  Patient Requesting Referral    Who Called:pt  Does the patient already have the specialty appointment scheduled?:  If yes, what is the date of that appointment?:  Referral to What Specialty:ENT   Reason for Referral:fluid in ear not draining  Does the patient want the referral with a specific physician?:  Is the specialist an [facility]?:  Patient Requesting a Response?:yes  Would the patient rather a call back or a response via [facility]? C/b  Best Call Back Number:[phone]  Additional Information: pt went to urgent care was diagnosed with fluid in ear and told to go to ENT pt needs a referral.  Pt is experiencing some gland pain as well

## 2022-08-31 NOTE — TELEPHONE ENCOUNTER
Called pt regarding referral. Advised pt she would need an appt to discuss referral, whether in office or telemed w/ verbalized understanding. Pt transferred to [facility] for scheduling.

## 2022-08-31 NOTE — TELEPHONE ENCOUNTER
----- Message from Yahaira Phoenix sent at 8/31/2022 10:16 AM CDT -----  Regarding: referral  Type:  Patient Requesting Referral    Who Called:pt  Does the patient already have the specialty appointment scheduled?:  If yes, what is the date of that appointment?:  Referral to What Specialty:ENT   Reason for Referral:fluid in ear not draining  Does the patient want the referral with a specific physician?:  Is the specialist an [facility]?:  Patient Requesting a Response?:yes  Would the patient rather a call back or a response via [facility]? C/b  Best Call Back Number:[phone]  Additional Information: pt went to urgent care was diagnosed with fluid in ear and told to go to ENT pt needs a referral.  Pt is experiencing some gland pain as well

## 2022-09-01 NOTE — TELEPHONE ENCOUNTER
Patient notified of referral and requested it be sent to Dr. Mathis. Referral placed sent to Dr. Kiel Mathis today. She will call them to schedule. Jacquie

## 2022-09-02 LAB — BACTERIA SPEC CULT: ABNORMAL

## 2022-09-03 ENCOUNTER — TELEPHONE (OUTPATIENT)
Dept: URGENT CARE | Facility: CLINIC | Age: 52
End: 2022-09-03
Payer: COMMERCIAL

## 2022-09-03 NOTE — TELEPHONE ENCOUNTER
----- Message from HEIDE Hidalgo sent at 9/2/2022  7:29 PM CDT -----  Please notify that drainage culture showed Enterococcus faecalis, which is sensitive to the antibiotic prescribed.  Continue antibiotic as previously prescribed and follow-up with PCP.

## 2022-09-16 ENCOUNTER — HISTORICAL (OUTPATIENT)
Dept: ADMINISTRATIVE | Facility: HOSPITAL | Age: 52
End: 2022-09-16
Payer: COMMERCIAL

## 2022-10-19 ENCOUNTER — OFFICE VISIT (OUTPATIENT)
Dept: FAMILY MEDICINE | Facility: CLINIC | Age: 52
End: 2022-10-19
Payer: COMMERCIAL

## 2022-10-19 VITALS
OXYGEN SATURATION: 99 % | WEIGHT: 207 LBS | DIASTOLIC BLOOD PRESSURE: 79 MMHG | SYSTOLIC BLOOD PRESSURE: 112 MMHG | TEMPERATURE: 98 F | HEART RATE: 60 BPM | RESPIRATION RATE: 16 BRPM | BODY MASS INDEX: 32.49 KG/M2 | HEIGHT: 67 IN

## 2022-10-19 DIAGNOSIS — E66.9 OBESITY WITH SERIOUS COMORBIDITY, UNSPECIFIED CLASSIFICATION, UNSPECIFIED OBESITY TYPE: ICD-10-CM

## 2022-10-19 DIAGNOSIS — Z79.01 ANTICOAGULATED ON COUMADIN: ICD-10-CM

## 2022-10-19 DIAGNOSIS — Z12.31 BREAST CANCER SCREENING BY MAMMOGRAM: ICD-10-CM

## 2022-10-19 DIAGNOSIS — E78.5 HYPERLIPIDEMIA, UNSPECIFIED HYPERLIPIDEMIA TYPE: ICD-10-CM

## 2022-10-19 DIAGNOSIS — Z95.2 H/O HEART VALVE REPLACEMENT WITH MECHANICAL VALVE: ICD-10-CM

## 2022-10-19 DIAGNOSIS — L40.50 PSORIATIC ARTHRITIS: ICD-10-CM

## 2022-10-19 DIAGNOSIS — D64.9 ANEMIA, UNSPECIFIED TYPE: ICD-10-CM

## 2022-10-19 DIAGNOSIS — Z00.00 WELLNESS EXAMINATION: Primary | ICD-10-CM

## 2022-10-19 DIAGNOSIS — Z12.11 COLON CANCER SCREENING: ICD-10-CM

## 2022-10-19 DIAGNOSIS — Z79.890 ON POSTMENOPAUSAL HORMONE REPLACEMENT THERAPY: ICD-10-CM

## 2022-10-19 DIAGNOSIS — Z23 NEEDS FLU SHOT: ICD-10-CM

## 2022-10-19 DIAGNOSIS — N20.0 KIDNEY STONE: ICD-10-CM

## 2022-10-19 DIAGNOSIS — K21.00 GASTROESOPHAGEAL REFLUX DISEASE WITH ESOPHAGITIS, UNSPECIFIED WHETHER HEMORRHAGE: ICD-10-CM

## 2022-10-19 DIAGNOSIS — F41.9 ANXIETY: ICD-10-CM

## 2022-10-19 DIAGNOSIS — F32.A DEPRESSIVE DISORDER: ICD-10-CM

## 2022-10-19 DIAGNOSIS — I73.9 PAD (PERIPHERAL ARTERY DISEASE): ICD-10-CM

## 2022-10-19 DIAGNOSIS — Z78.0 POSTMENOPAUSAL: ICD-10-CM

## 2022-10-19 DIAGNOSIS — G25.81 RESTLESS LEGS SYNDROME: ICD-10-CM

## 2022-10-19 PROBLEM — I77.9 PERIPHERAL ARTERIAL OCCLUSIVE DISEASE: Status: ACTIVE | Noted: 2022-10-19

## 2022-10-19 PROBLEM — K21.9 GASTROESOPHAGEAL REFLUX DISEASE: Status: ACTIVE | Noted: 2022-10-19

## 2022-10-19 PROBLEM — K82.9 GALLBLADDER DISEASE: Status: ACTIVE | Noted: 2022-10-19

## 2022-10-19 PROBLEM — G47.00 INSOMNIA: Status: ACTIVE | Noted: 2022-10-19

## 2022-10-19 PROBLEM — R23.2 FLUSHING: Status: ACTIVE | Noted: 2022-10-19

## 2022-10-19 PROCEDURE — 3078F PR MOST RECENT DIASTOLIC BLOOD PRESSURE < 80 MM HG: ICD-10-PCS | Mod: CPTII,,, | Performed by: FAMILY MEDICINE

## 2022-10-19 PROCEDURE — 90471 FLU VACCINE (QUAD) GREATER THAN OR EQUAL TO 3YO PRESERVATIVE FREE IM: ICD-10-PCS | Mod: ,,, | Performed by: FAMILY MEDICINE

## 2022-10-19 PROCEDURE — 1160F RVW MEDS BY RX/DR IN RCRD: CPT | Mod: CPTII,,, | Performed by: FAMILY MEDICINE

## 2022-10-19 PROCEDURE — 3074F SYST BP LT 130 MM HG: CPT | Mod: CPTII,,, | Performed by: FAMILY MEDICINE

## 2022-10-19 PROCEDURE — 1159F MED LIST DOCD IN RCRD: CPT | Mod: CPTII,,, | Performed by: FAMILY MEDICINE

## 2022-10-19 PROCEDURE — 1160F PR REVIEW ALL MEDS BY PRESCRIBER/CLIN PHARMACIST DOCUMENTED: ICD-10-PCS | Mod: CPTII,,, | Performed by: FAMILY MEDICINE

## 2022-10-19 PROCEDURE — 90471 IMMUNIZATION ADMIN: CPT | Mod: ,,, | Performed by: FAMILY MEDICINE

## 2022-10-19 PROCEDURE — 3074F PR MOST RECENT SYSTOLIC BLOOD PRESSURE < 130 MM HG: ICD-10-PCS | Mod: CPTII,,, | Performed by: FAMILY MEDICINE

## 2022-10-19 PROCEDURE — 1159F PR MEDICATION LIST DOCUMENTED IN MEDICAL RECORD: ICD-10-PCS | Mod: CPTII,,, | Performed by: FAMILY MEDICINE

## 2022-10-19 PROCEDURE — 90686 IIV4 VACC NO PRSV 0.5 ML IM: CPT | Mod: ,,, | Performed by: FAMILY MEDICINE

## 2022-10-19 PROCEDURE — 3078F DIAST BP <80 MM HG: CPT | Mod: CPTII,,, | Performed by: FAMILY MEDICINE

## 2022-10-19 PROCEDURE — 90686 FLU VACCINE (QUAD) GREATER THAN OR EQUAL TO 3YO PRESERVATIVE FREE IM: ICD-10-PCS | Mod: ,,, | Performed by: FAMILY MEDICINE

## 2022-10-19 PROCEDURE — 99396 PREV VISIT EST AGE 40-64: CPT | Mod: 25,,, | Performed by: FAMILY MEDICINE

## 2022-10-19 PROCEDURE — 99396 PR PREVENTIVE VISIT,EST,40-64: ICD-10-PCS | Mod: 25,,, | Performed by: FAMILY MEDICINE

## 2022-10-19 RX ORDER — RISANKIZUMAB-RZAA 150 MG/ML
INJECTION SUBCUTANEOUS
COMMUNITY
Start: 2022-10-01

## 2022-10-19 RX ORDER — ESTRADIOL 0.5 MG/1
0.5 TABLET ORAL DAILY
Qty: 30 TABLET | Refills: 11 | Status: SHIPPED | OUTPATIENT
Start: 2022-10-19 | End: 2023-01-30

## 2022-10-19 RX ORDER — METHOCARBAMOL 750 MG/1
1500 TABLET, FILM COATED ORAL 3 TIMES DAILY
Qty: 90 TABLET | Refills: 1 | Status: SHIPPED | OUTPATIENT
Start: 2022-10-19 | End: 2023-04-10

## 2022-10-19 RX ORDER — PANTOPRAZOLE SODIUM 40 MG/1
40 TABLET, DELAYED RELEASE ORAL DAILY
Qty: 30 TABLET | Refills: 11 | Status: SHIPPED | OUTPATIENT
Start: 2022-10-19 | End: 2023-10-19

## 2022-10-19 NOTE — PROGRESS NOTES
Subjective:        Patient ID: Holly Starr is a 52 y.o. female.    Chief Complaint: Annual Exam (Wellness/Patient needs flu shot/Patient needs estradiol refill)      Presents to the clinic unaccompanied for her wellness visit.  She is due for lab work.  She would like her flu shot today.    She has a history mechanical heart valve replacement as well as hyperlipidemia.  Her cardiologist is Dr. Hoyos.  Saw him yesterday.   She is on Coumadin.  She checks her INR at home.  She also has a history of peripheral vascular disease and had stents placed in her legs in September 2018 with Dr. Johnson.  On lovenox only if has to bridge for procedure    He has a history of anxiety and depression and was previously followed by Dr. Campos.  She is now seeing Dr. Mala Giron at dr. childers. Saw a couple weeks ago.   She reports she is stable on current medications.  She is also seeing a counselor.  Has had inpatient stay at [facility] in the past.     He has a history of psoriatic arthritis and follows by Dr. Donovan. On skyrizi    She has a history of kidney stones and follows with dr. Mj mackay. No stones since 2/2022.     She is obese    She has anemia and is on oral iron.     Has RLS and Is on robaxin prn.     Has gerd. Taking pepcid otc.  Reports history of barretts.    She is postmenopausal.  She had a complete hysterectomy in 2007 and was on Premarin for a time but was weaned off.  Her gyn is Dr. Zamudio.  She is currently on estradiol and is doing well.  She needs a refill    Her last mammogram was November 2021 at the breast center.  Her bone density test was June 2018 at the breast center and was normal.  They recommended a repeat in 3-5 years.  She has had a colonoscopy with Dr. Bains in May of 2019 which recommended a repeat in 5 years.  She has had a total hysterectomy in 2007 and therefore no longer does Pap smears.    She was seen in the emergency room in October of 2020 for vaginal bleeding after having  "intercourse with a new partner after over 7 months of abstinence.  She was transferred to [facility] and had to have surgery to repair of vaginal laceration with Dr. Tyler.    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     Review of Systems   Constitutional: Negative.    HENT: Negative.     Respiratory: Negative.     Cardiovascular: Negative.    Gastrointestinal: Negative.    Genitourinary: Negative.        Review of patient's allergies indicates:   Allergen Reactions    Prednisone      Other reaction(s): heart racing  heart race  heart race      Mirtazapine Anxiety and Other (See Comments)     Causes nerves to be on fire like fibromyalgia pain.  Other reaction(s): nerves on fire    Niacin Rash     Other reaction(s): Tachycardia, tacycardia      Vitals:    10/19/22 1533   BP: 112/79   BP Location: Left arm   Pulse: 60   Resp: 16   Temp: 97.9 °F (36.6 °C)   SpO2: 99%   Weight: 93.9 kg (207 lb)   Height: 5' 7" (1.702 m)      Social History     Socioeconomic History    Marital status:    Occupational History    Occupation: teacher   Tobacco Use    Smoking status: Never    Smokeless tobacco: Never   Substance and Sexual Activity    Alcohol use: Yes     Comment: occassionally    Drug use: Never      History reviewed. No pertinent family history.       Objective:     Physical Exam  Vitals and nursing note reviewed.   Constitutional:       Appearance: Normal appearance. She is obese.   HENT:      Head: Normocephalic and atraumatic.      Nose: Nose normal.      Mouth/Throat:      Mouth: Mucous membranes are moist.      Pharynx: Oropharynx is clear.   Eyes:      Extraocular Movements: Extraocular movements intact.   Cardiovascular:      Rate and Rhythm: Normal rate and regular rhythm.      Pulses: Normal pulses.      Heart sounds: Normal heart sounds.   Pulmonary:      Effort: Pulmonary effort is normal.      Breath sounds: Normal breath sounds.   Musculoskeletal:         General: Normal " range of motion.      Cervical back: Normal range of motion.   Skin:     General: Skin is warm and dry.   Neurological:      General: No focal deficit present.      Mental Status: She is alert and oriented to person, place, and time. Mental status is at baseline.   Psychiatric:         Mood and Affect: Mood normal.     Current Outpatient Medications on File Prior to Visit   Medication Sig Dispense Refill    albuterol (PROVENTIL) 2.5 mg /3 mL (0.083 %) nebulizer solution Inhale 2.5 mg into the lungs daily as needed.      clobetasoL (TEMOVATE) 0.05 % external solution Apply 0.05 application topically daily as needed.      enoxaparin (LOVENOX) 100 mg/mL Syrg Inject 100 mg into the skin once daily. (for 7 days.)      ferrous sulfate 325 (65 FE) MG EC tablet Take 325 mg by mouth once daily at 6am.      furosemide (LASIX) 40 MG tablet Take 40 mg by mouth once daily.      hyoscyamine (LEVSIN/SL) 0.125 mg Subl Place 0.125 mg under the tongue daily as needed.      metoprolol succinate (TOPROL-XL) 25 MG 24 hr tablet Take 25 mg by mouth once daily.      QUEtiapine (SEROQUEL) 400 MG tablet Take 400 mg by mouth every evening.      rosuvastatin (CRESTOR) 20 MG tablet Take 20 mg by mouth every evening.      sertraline (ZOLOFT) 100 MG tablet Take 200 mg by mouth once daily.      SKYRIZI 150 mg/mL PnIj Inject into the skin.      sucralfate (CARAFATE) 100 mg/mL suspension Take 1 g by mouth daily as needed.      temazepam (RESTORIL) 15 mg Cap Take 15 mg by mouth nightly as needed.      warfarin (COUMADIN) 3 MG tablet Take 3 mg by mouth once daily.      [DISCONTINUED] atorvastatin (LIPITOR) 40 MG tablet Take 40 mg by mouth once daily.      [DISCONTINUED] estradioL (ESTRACE) 0.5 MG tablet Take 0.5 mg by mouth once daily.      [DISCONTINUED] methocarbamoL (ROBAXIN) 750 MG Tab Take 1,500 mg by mouth 3 (three) times daily.      [DISCONTINUED] mupirocin (BACTROBAN) 2 % ointment Apply to affected area 3 times daily (Patient not taking:  Reported on 10/19/2022) 22 g 1     No current facility-administered medications on file prior to visit.     Health Maintenance   Topic Date Due    Hepatitis C Screening  Never done    Mammogram  11/22/2022    Lipid Panel  02/16/2027    TETANUS VACCINE  07/06/2030      Results for orders placed or performed in visit on 09/16/22   POCT Influenza A/B Molecular   Result Value Ref Range    Inflenza A Ag Positive     Influenza B Ag Negative           Assessment & Plan:     Active Problem List with Overview Notes    Diagnosis Date Noted    Wellness examination 10/19/2022    Postmenopausal 10/19/2022    H/O heart valve replacement with mechanical valve 10/19/2022     aortic [id number]      Gallbladder disease 10/19/2022    Breast cancer screening by mammogram 10/19/2022    Anxiety 10/19/2022    Anticoagulated on Coumadin 10/19/2022    Anemia 10/19/2022    Kidney stone 10/19/2022    Depressive disorder 10/19/2022    Flushing 10/19/2022    Gastroesophageal reflux disease 10/19/2022    Hyperlipidemia 10/19/2022    Insomnia 10/19/2022    Obesity 10/19/2022    PAD (peripheral artery disease) 10/19/2022    Psoriatic arthritis 10/19/2022    Restless legs syndrome 10/19/2022    On postmenopausal hormone replacement therapy 10/19/2022    Needs flu shot     Colon cancer screening     Acute pain of left knee 05/27/2022    DVT, bilateral lower limbs 05/04/2022       1. Wellness examination  Assessment & Plan:  Fasting labs ordered. Will call with results when available  Mammogram  11/2021. ordered  DEXA  6/2018   No longer does pap due to complete hyst 2007  Colonoscopy with Dr. lozano 5/2019  Flu shot today      Orders:  -     CBC Auto Differential; Future; Expected date: 10/19/2022  -     Comprehensive Metabolic Panel; Future; Expected date: 10/19/2022  -     Lipid Panel; Future; Expected date: 10/19/2022  -     TSH; Future; Expected date: 10/19/2022  -     Hemoglobin A1C; Future; Expected date: 10/19/2022  -     Urinalysis;  Future; Expected date: 10/19/2022  -     Hepatitis C Antibody; Future; Expected date: 10/19/2022  -     HIV 1/2 Ag/Ab (4th Gen); Future; Expected date: 10/19/2022    2. Postmenopausal  Assessment & Plan:  dexa 6/2018 normal. Repeat ordered .    Orders:  -     DXA Bone Density Spine And Hip; Future; Expected date: 10/19/2022  -     CBC Auto Differential; Future; Expected date: 10/19/2022  -     Comprehensive Metabolic Panel; Future; Expected date: 10/19/2022  -     Lipid Panel; Future; Expected date: 10/19/2022  -     TSH; Future; Expected date: 10/19/2022  -     Hemoglobin A1C; Future; Expected date: 10/19/2022  -     Urinalysis; Future; Expected date: 10/19/2022  -     Hepatitis C Antibody; Future; Expected date: 10/19/2022  -     HIV 1/2 Ag/Ab (4th Gen); Future; Expected date: 10/19/2022    3. On postmenopausal hormone replacement therapy  Assessment & Plan:  On estradiol    Orders:  -     CBC Auto Differential; Future; Expected date: 10/19/2022  -     Comprehensive Metabolic Panel; Future; Expected date: 10/19/2022  -     Lipid Panel; Future; Expected date: 10/19/2022  -     TSH; Future; Expected date: 10/19/2022  -     Hemoglobin A1C; Future; Expected date: 10/19/2022  -     Urinalysis; Future; Expected date: 10/19/2022  -     Hepatitis C Antibody; Future; Expected date: 10/19/2022  -     HIV 1/2 Ag/Ab (4th Gen); Future; Expected date: 10/19/2022    4. Breast cancer screening by mammogram  Assessment & Plan:  Repeat mmg due 11/2022. ordered    Orders:  -     Mammo Digital Screening Bilat; Future; Expected date: 10/19/2022  -     CBC Auto Differential; Future; Expected date: 10/19/2022  -     Comprehensive Metabolic Panel; Future; Expected date: 10/19/2022  -     Lipid Panel; Future; Expected date: 10/19/2022  -     TSH; Future; Expected date: 10/19/2022  -     Hemoglobin A1C; Future; Expected date: 10/19/2022  -     Urinalysis; Future; Expected date: 10/19/2022  -     Hepatitis C Antibody; Future; Expected date:  10/19/2022  -     HIV 1/2 Ag/Ab (4th Gen); Future; Expected date: 10/19/2022    5. H/O heart valve replacement with mechanical valve  Overview:  aortic [id number]    Assessment & Plan:  On coumadin. Keep appts with cards.    Orders:  -     CBC Auto Differential; Future; Expected date: 10/19/2022  -     Comprehensive Metabolic Panel; Future; Expected date: 10/19/2022  -     Lipid Panel; Future; Expected date: 10/19/2022  -     TSH; Future; Expected date: 10/19/2022  -     Hemoglobin A1C; Future; Expected date: 10/19/2022  -     Urinalysis; Future; Expected date: 10/19/2022  -     Hepatitis C Antibody; Future; Expected date: 10/19/2022  -     HIV 1/2 Ag/Ab (4th Gen); Future; Expected date: 10/19/2022    6. Hyperlipidemia, unspecified hyperlipidemia type  Assessment & Plan:  On statin. Keep appts with cards    Orders:  -     CBC Auto Differential; Future; Expected date: 10/19/2022  -     Comprehensive Metabolic Panel; Future; Expected date: 10/19/2022  -     Lipid Panel; Future; Expected date: 10/19/2022  -     TSH; Future; Expected date: 10/19/2022  -     Hemoglobin A1C; Future; Expected date: 10/19/2022  -     Urinalysis; Future; Expected date: 10/19/2022  -     Hepatitis C Antibody; Future; Expected date: 10/19/2022  -     HIV 1/2 Ag/Ab (4th Gen); Future; Expected date: 10/19/2022    7. PAD (peripheral artery disease)  Assessment & Plan:  History of stents. keep appts with cards    Orders:  -     CBC Auto Differential; Future; Expected date: 10/19/2022  -     Comprehensive Metabolic Panel; Future; Expected date: 10/19/2022  -     Lipid Panel; Future; Expected date: 10/19/2022  -     TSH; Future; Expected date: 10/19/2022  -     Hemoglobin A1C; Future; Expected date: 10/19/2022  -     Urinalysis; Future; Expected date: 10/19/2022  -     Hepatitis C Antibody; Future; Expected date: 10/19/2022  -     HIV 1/2 Ag/Ab (4th Gen); Future; Expected date: 10/19/2022    8. Anticoagulated on Coumadin  Assessment & Plan:  Keep  appts with cards    Orders:  -     CBC Auto Differential; Future; Expected date: 10/19/2022  -     Comprehensive Metabolic Panel; Future; Expected date: 10/19/2022  -     Lipid Panel; Future; Expected date: 10/19/2022  -     TSH; Future; Expected date: 10/19/2022  -     Hemoglobin A1C; Future; Expected date: 10/19/2022  -     Urinalysis; Future; Expected date: 10/19/2022  -     Hepatitis C Antibody; Future; Expected date: 10/19/2022  -     HIV 1/2 Ag/Ab (4th Gen); Future; Expected date: 10/19/2022    9. Anxiety  Assessment & Plan:  Stable on current meds. Keep appts with psych    Orders:  -     CBC Auto Differential; Future; Expected date: 10/19/2022  -     Comprehensive Metabolic Panel; Future; Expected date: 10/19/2022  -     Lipid Panel; Future; Expected date: 10/19/2022  -     TSH; Future; Expected date: 10/19/2022  -     Hemoglobin A1C; Future; Expected date: 10/19/2022  -     Urinalysis; Future; Expected date: 10/19/2022  -     Hepatitis C Antibody; Future; Expected date: 10/19/2022  -     HIV 1/2 Ag/Ab (4th Gen); Future; Expected date: 10/19/2022    10. Depressive disorder  Assessment & Plan:  Stable on current meds. Keep appts with psych    Orders:  -     CBC Auto Differential; Future; Expected date: 10/19/2022  -     Comprehensive Metabolic Panel; Future; Expected date: 10/19/2022  -     Lipid Panel; Future; Expected date: 10/19/2022  -     TSH; Future; Expected date: 10/19/2022  -     Hemoglobin A1C; Future; Expected date: 10/19/2022  -     Urinalysis; Future; Expected date: 10/19/2022  -     Hepatitis C Antibody; Future; Expected date: 10/19/2022  -     HIV 1/2 Ag/Ab (4th Gen); Future; Expected date: 10/19/2022    11. Anemia, unspecified type  Assessment & Plan:  On oral iron.     Orders:  -     CBC Auto Differential; Future; Expected date: 10/19/2022  -     Comprehensive Metabolic Panel; Future; Expected date: 10/19/2022  -     Lipid Panel; Future; Expected date: 10/19/2022  -     TSH; Future; Expected  date: 10/19/2022  -     Hemoglobin A1C; Future; Expected date: 10/19/2022  -     Urinalysis; Future; Expected date: 10/19/2022  -     Hepatitis C Antibody; Future; Expected date: 10/19/2022  -     HIV 1/2 Ag/Ab (4th Gen); Future; Expected date: 10/19/2022    12. Obesity with serious comorbidity, unspecified classification, unspecified obesity type  Assessment & Plan:  Encourage lifestyle change    Orders:  -     CBC Auto Differential; Future; Expected date: 10/19/2022  -     Comprehensive Metabolic Panel; Future; Expected date: 10/19/2022  -     Lipid Panel; Future; Expected date: 10/19/2022  -     TSH; Future; Expected date: 10/19/2022  -     Hemoglobin A1C; Future; Expected date: 10/19/2022  -     Urinalysis; Future; Expected date: 10/19/2022  -     Hepatitis C Antibody; Future; Expected date: 10/19/2022  -     HIV 1/2 Ag/Ab (4th Gen); Future; Expected date: 10/19/2022    13. Psoriatic arthritis  Assessment & Plan:  On skyrizi. Keep appts with derm    Orders:  -     CBC Auto Differential; Future; Expected date: 10/19/2022  -     Comprehensive Metabolic Panel; Future; Expected date: 10/19/2022  -     Lipid Panel; Future; Expected date: 10/19/2022  -     TSH; Future; Expected date: 10/19/2022  -     Hemoglobin A1C; Future; Expected date: 10/19/2022  -     Urinalysis; Future; Expected date: 10/19/2022  -     Hepatitis C Antibody; Future; Expected date: 10/19/2022  -     HIV 1/2 Ag/Ab (4th Gen); Future; Expected date: 10/19/2022    14. Kidney stone  Assessment & Plan:  Keep appts with urology    Orders:  -     CBC Auto Differential; Future; Expected date: 10/19/2022  -     Comprehensive Metabolic Panel; Future; Expected date: 10/19/2022  -     Lipid Panel; Future; Expected date: 10/19/2022  -     TSH; Future; Expected date: 10/19/2022  -     Hemoglobin A1C; Future; Expected date: 10/19/2022  -     Urinalysis; Future; Expected date: 10/19/2022  -     Hepatitis C Antibody; Future; Expected date: 10/19/2022  -     HIV  1/2 Ag/Ab (4th Gen); Future; Expected date: 10/19/2022    15. Needs flu shot  Assessment & Plan:  Flu shot today    Orders:  -     CBC Auto Differential; Future; Expected date: 10/19/2022  -     Comprehensive Metabolic Panel; Future; Expected date: 10/19/2022  -     Lipid Panel; Future; Expected date: 10/19/2022  -     TSH; Future; Expected date: 10/19/2022  -     Hemoglobin A1C; Future; Expected date: 10/19/2022  -     Urinalysis; Future; Expected date: 10/19/2022  -     Hepatitis C Antibody; Future; Expected date: 10/19/2022  -     HIV 1/2 Ag/Ab (4th Gen); Future; Expected date: 10/19/2022    16. Colon cancer screening  Assessment & Plan:  Colonoscopy with dr. lozano 5/2019      17. Gastroesophageal reflux disease with esophagitis, unspecified whether hemorrhage  Assessment & Plan:  On pepcid. Not working well. Will try pantoprazole.     Orders:  -     pantoprazole (PROTONIX) 40 MG tablet; Take 1 tablet (40 mg total) by mouth once daily.  Dispense: 30 tablet; Refill: 11    18. Restless legs syndrome  Assessment & Plan:  On robaxin prn.      Other orders  -     methocarbamoL (ROBAXIN) 750 MG Tab; Take 2 tablets (1,500 mg total) by mouth 3 (three) times daily.  Dispense: 90 tablet; Refill: 1  -     estradioL (ESTRACE) 0.5 MG tablet; Take 1 tablet (0.5 mg total) by mouth once daily.  Dispense: 30 tablet; Refill: 11       Follow up in about 1 year (around 10/19/2023) for Wellness with Labs.

## 2022-10-19 NOTE — ASSESSMENT & PLAN NOTE
Fasting labs ordered. Will call with results when available  Mammogram  11/2021. ordered  DEXA  6/2018   No longer does pap due to complete hyst 2007  Colonoscopy with Dr. lozano 5/2019  Flu shot today

## 2022-11-08 ENCOUNTER — DOCUMENTATION ONLY (OUTPATIENT)
Dept: ADMINISTRATIVE | Facility: HOSPITAL | Age: 52
End: 2022-11-08
Payer: COMMERCIAL

## 2022-11-09 ENCOUNTER — DOCUMENTATION ONLY (OUTPATIENT)
Dept: ADMINISTRATIVE | Facility: HOSPITAL | Age: 52
End: 2022-11-09
Payer: COMMERCIAL

## 2022-11-30 ENCOUNTER — HOSPITAL ENCOUNTER (OUTPATIENT)
Dept: RADIOLOGY | Facility: HOSPITAL | Age: 52
Discharge: HOME | End: 2022-11-30
Attending: NURSE PRACTITIONER
Payer: COMMERCIAL

## 2022-11-30 ENCOUNTER — OFFICE VISIT (OUTPATIENT)
Dept: URGENT CARE | Facility: CLINIC | Age: 52
End: 2022-11-30
Payer: COMMERCIAL

## 2022-11-30 ENCOUNTER — HOSPITAL ENCOUNTER (INPATIENT)
Facility: HOSPITAL | Age: 52
LOS: 4 days | Discharge: HOME | DRG: 871 | End: 2022-12-05
Attending: EMERGENCY MEDICINE | Admitting: INTERNAL MEDICINE
Payer: COMMERCIAL

## 2022-11-30 VITALS
TEMPERATURE: 99 F | BODY MASS INDEX: 33.09 KG/M2 | HEIGHT: 67 IN | OXYGEN SATURATION: 100 % | SYSTOLIC BLOOD PRESSURE: 105 MMHG | HEART RATE: 111 BPM | RESPIRATION RATE: 18 BRPM | DIASTOLIC BLOOD PRESSURE: 64 MMHG | WEIGHT: 210.81 LBS

## 2022-11-30 DIAGNOSIS — R06.00 DYSPNEA, UNSPECIFIED TYPE: ICD-10-CM

## 2022-11-30 DIAGNOSIS — J18.9 PNEUMONIA OF BOTH LUNGS DUE TO INFECTIOUS ORGANISM, UNSPECIFIED PART OF LUNG: Primary | ICD-10-CM

## 2022-11-30 DIAGNOSIS — J18.9 COMMUNITY ACQUIRED PNEUMONIA, UNSPECIFIED LATERALITY: Primary | ICD-10-CM

## 2022-11-30 DIAGNOSIS — R06.02 SOB (SHORTNESS OF BREATH): ICD-10-CM

## 2022-11-30 PROBLEM — I27.20 PULMONARY HYPERTENSION: Status: ACTIVE | Noted: 2022-11-30

## 2022-11-30 PROCEDURE — 83880 ASSAY OF NATRIURETIC PEPTIDE: CPT | Performed by: NURSE PRACTITIONER

## 2022-11-30 PROCEDURE — 96366 THER/PROPH/DIAG IV INF ADDON: CPT

## 2022-11-30 PROCEDURE — 99213 PR OFFICE/OUTPT VISIT, EST, LEVL III, 20-29 MIN: ICD-10-PCS | Mod: S$PBB,,, | Performed by: NURSE PRACTITIONER

## 2022-11-30 PROCEDURE — 0202U NFCT DS 22 TRGT SARS-COV-2: CPT | Performed by: NURSE PRACTITIONER

## 2022-11-30 PROCEDURE — 96368 THER/DIAG CONCURRENT INF: CPT

## 2022-11-30 PROCEDURE — 96375 TX/PRO/DX INJ NEW DRUG ADDON: CPT

## 2022-11-30 PROCEDURE — 93005 ELECTROCARDIOGRAM TRACING: CPT

## 2022-11-30 PROCEDURE — 84484 ASSAY OF TROPONIN QUANT: CPT | Performed by: NURSE PRACTITIONER

## 2022-11-30 PROCEDURE — 85025 COMPLETE CBC W/AUTO DIFF WBC: CPT | Performed by: NURSE PRACTITIONER

## 2022-11-30 PROCEDURE — 85027 COMPLETE CBC AUTOMATED: CPT | Performed by: NURSE PRACTITIONER

## 2022-11-30 PROCEDURE — 96361 HYDRATE IV INFUSION ADD-ON: CPT

## 2022-11-30 PROCEDURE — 99285 EMERGENCY DEPT VISIT HI MDM: CPT | Mod: 25,27

## 2022-11-30 PROCEDURE — 80053 COMPREHEN METABOLIC PANEL: CPT | Performed by: NURSE PRACTITIONER

## 2022-11-30 PROCEDURE — 63600175 PHARM REV CODE 636 W HCPCS: Performed by: NURSE PRACTITIONER

## 2022-11-30 PROCEDURE — 85610 PROTHROMBIN TIME: CPT | Performed by: NURSE PRACTITIONER

## 2022-11-30 PROCEDURE — 99213 OFFICE O/P EST LOW 20 MIN: CPT | Mod: S$PBB,,, | Performed by: NURSE PRACTITIONER

## 2022-11-30 PROCEDURE — 99215 OFFICE O/P EST HI 40 MIN: CPT | Mod: PBBFAC,25 | Performed by: NURSE PRACTITIONER

## 2022-11-30 PROCEDURE — 71046 X-RAY EXAM CHEST 2 VIEWS: CPT | Mod: TC

## 2022-11-30 PROCEDURE — 96367 TX/PROPH/DG ADDL SEQ IV INF: CPT

## 2022-11-30 PROCEDURE — 63600175 PHARM REV CODE 636 W HCPCS: Performed by: EMERGENCY MEDICINE

## 2022-11-30 PROCEDURE — 96365 THER/PROPH/DIAG IV INF INIT: CPT

## 2022-11-30 RX ORDER — LIDOCAINE HYDROCHLORIDE 10 MG/ML
1 INJECTION INFILTRATION; PERINEURAL ONCE
Status: DISCONTINUED | OUTPATIENT
Start: 2022-11-30 | End: 2022-11-30

## 2022-11-30 RX ORDER — COVID-19 ANTIGEN TEST
KIT MISCELLANEOUS
COMMUNITY
Start: 2022-11-30

## 2022-11-30 RX ORDER — PREDNISONE 10 MG/1
TABLET ORAL
Qty: 10 TABLET | Refills: 0 | Status: ON HOLD | OUTPATIENT
Start: 2022-11-30 | End: 2022-12-05 | Stop reason: HOSPADM

## 2022-11-30 RX ORDER — ALBUTEROL SULFATE 90 UG/1
1 AEROSOL, METERED RESPIRATORY (INHALATION) EVERY 4 HOURS PRN
Qty: 18 G | Refills: 0 | Status: SHIPPED | OUTPATIENT
Start: 2022-11-30 | End: 2023-05-22

## 2022-11-30 RX ORDER — DOXYCYCLINE 100 MG/1
100 CAPSULE ORAL 2 TIMES DAILY
Qty: 14 CAPSULE | Refills: 0 | Status: ON HOLD | OUTPATIENT
Start: 2022-11-30 | End: 2022-12-05 | Stop reason: HOSPADM

## 2022-11-30 RX ORDER — CEFTRIAXONE 1 G/1
1 INJECTION, POWDER, FOR SOLUTION INTRAMUSCULAR; INTRAVENOUS
Status: DISCONTINUED | OUTPATIENT
Start: 2022-11-30 | End: 2022-11-30 | Stop reason: HOSPADM

## 2022-11-30 RX ADMIN — CEFTRIAXONE 1 G: 1 INJECTION, POWDER, FOR SOLUTION INTRAMUSCULAR; INTRAVENOUS at 05:11

## 2022-11-30 RX ADMIN — SODIUM CHLORIDE, POTASSIUM CHLORIDE, SODIUM LACTATE AND CALCIUM CHLORIDE 1000 ML: 600; 310; 30; 20 INJECTION, SOLUTION INTRAVENOUS at 11:11

## 2022-11-30 RX ADMIN — LIDOCAINE HYDROCHLORIDE 1 ML: 10 INJECTION INFILTRATION; PERINEURAL at 06:11

## 2022-11-30 NOTE — LETTER
November 30, 2022      [facility]  [facility] [facility] [address]  Phone: [phone]       Patient: Holly Starr   YOB: 1970  Date of Visit: 11/30/2022    To Whom It May Concern:    Paty Starr  was at [facility] on 11/30/2022. The patient may return to work/school on 12/07/2022 with no restrictions. If you have any questions or concerns, or if I can be of further assistance, please do not hesitate to contact me.    Sincerely,    HEIDE Reed

## 2022-11-30 NOTE — PATIENT INSTRUCTIONS
"A test called a "Respiratory PCR Panel" is being done for you today.  This is a test that is done with a nasal swab, and checks for approximately 20 viruses that can be causing your illness. It is very unlikely that this will change your treatment plan or plan of care as discussed during your visit, but in the event you need something different/additional, we will let you know through your portal shawna, or by phone within 7 days.    "

## 2022-11-30 NOTE — PROGRESS NOTES
"Subjective:       Patient ID: Holly Starr is a 52 y.o. female.    Vitals:  height is 5' 7.01" (1.702 m) and weight is 95.6 kg (210 lb 12.8 oz). Her oral temperature is 98.5 °F (36.9 °C). Her blood pressure is 105/64 and her pulse is 111 (abnormal). Her respiration is 18 and oxygen saturation is 100%.     Chief Complaint: Other Misc (Breathing is difficult,  coughing up nothing - Entered by patient), Shortness of Breath, Cough, and Dizziness    HPI this patient was seen in ER yesterday by Dr. Glez. Was tested for COVID/FLU/STREP by PCR and was negative for all. Treated as FLU+ in case of early false negative and Rx'ed Xofluza. Was afebrile at time of that exam. Noted hx of heart valve replacement, pulmonary HTN, DVT, PAD, and GERD. She did an at home COVID test, was negative.   ROS    Objective:      Physical Exam   Constitutional: She is oriented to person, place, and time.  Non-toxic appearance. No distress.   HENT:   Nose: Congestion present. No rhinorrhea.   Eyes: Conjunctivae are normal.   Cardiovascular: Regular rhythm and normal heart sounds. Tachycardia present.   Pulmonary/Chest: Accessory muscle usage present. Tachypnea noted. She has wheezes in the left middle field and the left lower field. She has rhonchi in the right lower field.   Musculoskeletal: Normal range of motion.         General: Normal range of motion.   Neurological: She is alert and oriented to person, place, and time.   Skin: Skin is warm, dry, not diaphoretic and pale.   Psychiatric: Her behavior is normal. Mood, judgment and thought content normal.   Vitals reviewed.      Assessment:       1. Community acquired pneumonia, unspecified laterality    2. Dyspnea, unspecified type          Results for orders placed or performed during the hospital encounter of 11/29/22   COVID/FLU A&B PCR   Result Value Ref Range    Influenza A PCR Not Detected Not Detected    Influenza B PCR Not Detected Not Detected    SARS-CoV-2 PCR Not Detected Not " Detected   Strep Group A by PCR   Result Value Ref Range    STREP A PCR ([facility]) Not Detected Not Detected   Urinalysis, Reflex to Urine Culture Urine, Clean Catch    Specimen: Urine   Result Value Ref Range    Color, UA Straw Yellow, Light-Yellow, Dark Yellow, Ronna, Straw    Appearance, UA Clear Clear    Specific Gravity, UA 1.015     pH, UA 7.0 5.0 - 8.5    Protein, UA Negative Negative mg/dL    Glucose, UA Negative Negative, Normal mg/dL    Ketones, UA Negative Negative mg/dL    Blood, UA Negative Negative unit/L    Bilirubin, UA Negative Negative mg/dL    Urobilinogen, UA 1.0 0.2, 1.0, Normal mg/dL    Nitrites, UA Negative Negative    Leukocyte Esterase, UA Negative Negative unit/L     XR CHEST PA AND LATERAL    Result Date: 11/30/2022  EXAMINATION: XR CHEST PA AND LATERAL   CLINICAL HISTORY: Other specified symptoms and signs involving the circulatory and respiratory systemsdyspnea; covid/flu negative yesterday with URI symptoms; COMPARISON: 18 February 2022   FINDINGS: PA and lateral views of the chest were obtained. Median sternotomy.  There is prosthetic aortic valve.  Heart is mildly enlarged.  There are left upper lobe and right infrahilar opacities which are new compared to the prior radiographs.  No pneumothorax or significant pleural effusion.     Bilateral opacities new compared to the prior films.  Pneumonia possible.  Recommend follow-up radiographs in 1 month to document resolution and exclude underlying mass. Electronically signed by: Ruslan Mcguire   Date:    11/30/2022   Time:    16:46     Plan:         Community acquired pneumonia, unspecified laterality  -     Cancel: COVID/RSV/FLU A&B PCR; Future; Expected date: 11/30/2022  -     Respiratory Panel  -     XR CHEST PA AND LATERAL    Dyspnea, unspecified type  -     Respiratory Panel  -     XR CHEST PA AND LATERAL    Other orders  -     spirometers and accessories Asya; 10 deep inhalations every hour while you are awake for the next 7 days.   "Dispense: 1 each; Refill: 0  -     cefTRIAXone injection 1 g  -     LIDOcaine HCL 10 mg/ml (1%) injection 1 mL  -     doxycycline (MONODOX) 100 MG capsule; Take 1 capsule (100 mg total) by mouth 2 (two) times daily. With food and water.  Dispense: 14 capsule; Refill: 0  -     albuterol (VENTOLIN HFA) 90 mcg/actuation inhaler; Inhale 1 puff into the lungs every 4 (four) hours as needed for Wheezing or Shortness of Breath.  Dispense: 18 g; Refill: 0  -     predniSONE (DELTASONE) 10 MG tablet; 40mg (4 tabs) day 1. 30mg (3 tabs) day 2; 20mg (2 tabs) day 3. 10mg (1 tab) day 4. Take with food and water no later than 5pm.  Dispense: 10 tablet; Refill: 0          A test called a "Respiratory PCR Panel" is being done for you today.  This is a test that is done with a nasal swab, and checks for approximately 20 viruses that can be causing your illness. It is very unlikely that this will change your treatment plan or plan of care as discussed during your visit, but in the event you need something different/additional, we will let you know through your portal shawna, or by phone within 7 days.         "

## 2022-12-01 ENCOUNTER — TELEPHONE (OUTPATIENT)
Dept: URGENT CARE | Facility: CLINIC | Age: 52
End: 2022-12-01
Payer: COMMERCIAL

## 2022-12-01 LAB
ABS NEUT (OLG): 16.47 X10(3)/MCL (ref 2.1–9.2)
ABS NEUT (OLG): 20.96 X10(3)/MCL (ref 2.1–9.2)
ALBUMIN SERPL-MCNC: 3 GM/DL (ref 3.5–5)
ALBUMIN SERPL-MCNC: 3.5 GM/DL (ref 3.5–5)
ALBUMIN/GLOB SERPL: 1 RATIO (ref 1.1–2)
ALBUMIN/GLOB SERPL: 1.2 RATIO (ref 1.1–2)
ALP SERPL-CCNC: 88 UNIT/L (ref 40–150)
ALP SERPL-CCNC: 92 UNIT/L (ref 40–150)
ALT SERPL-CCNC: 22 UNIT/L (ref 0–55)
ALT SERPL-CCNC: 25 UNIT/L (ref 0–55)
AST SERPL-CCNC: 21 UNIT/L (ref 5–34)
AST SERPL-CCNC: 25 UNIT/L (ref 5–34)
B PERT.PT PRMT NPH QL NAA+NON-PROBE: NOT DETECTED
BASOPHILS NFR BLD MANUAL: 0.18 X10(3)/MCL (ref 0–0.2)
BASOPHILS NFR BLD MANUAL: 1 %
BILIRUBIN DIRECT+TOT PNL SERPL-MCNC: 0.4 MG/DL
BILIRUBIN DIRECT+TOT PNL SERPL-MCNC: 0.5 MG/DL
BNP BLD-MCNC: 399.7 PG/ML
BUN SERPL-MCNC: 24.1 MG/DL (ref 9.8–20.1)
BUN SERPL-MCNC: 31.3 MG/DL (ref 9.8–20.1)
BURR CELLS (OLG): ABNORMAL
C PNEUM DNA NPH QL NAA+NON-PROBE: NOT DETECTED
CALCIUM SERPL-MCNC: 9.1 MG/DL (ref 8.4–10.2)
CALCIUM SERPL-MCNC: 9.8 MG/DL (ref 8.4–10.2)
CHLORIDE SERPL-SCNC: 106 MMOL/L (ref 98–107)
CHLORIDE SERPL-SCNC: 113 MMOL/L (ref 98–107)
CO2 SERPL-SCNC: 18 MMOL/L (ref 22–29)
CO2 SERPL-SCNC: 20 MMOL/L (ref 22–29)
CREAT SERPL-MCNC: 0.89 MG/DL (ref 0.55–1.02)
CREAT SERPL-MCNC: 1.39 MG/DL (ref 0.55–1.02)
ERYTHROCYTE [DISTWIDTH] IN BLOOD BY AUTOMATED COUNT: 16.3 % (ref 11.5–17)
ERYTHROCYTE [DISTWIDTH] IN BLOOD BY AUTOMATED COUNT: 16.6 % (ref 11.5–17)
GFR SERPLBLD CREATININE-BSD FMLA CKD-EPI: 46 MLS/MIN/1.73/M2
GFR SERPLBLD CREATININE-BSD FMLA CKD-EPI: >60 MLS/MIN/1.73/M2
GLOBULIN SER-MCNC: 2.5 GM/DL (ref 2.4–3.5)
GLOBULIN SER-MCNC: 3.5 GM/DL (ref 2.4–3.5)
GLUCOSE SERPL-MCNC: 110 MG/DL (ref 74–100)
GLUCOSE SERPL-MCNC: 81 MG/DL (ref 74–100)
HADV DNA NPH QL NAA+NON-PROBE: NOT DETECTED
HCOV 229E RNA NPH QL NAA+NON-PROBE: NOT DETECTED
HCOV HKU1 RNA NPH QL NAA+NON-PROBE: NOT DETECTED
HCOV NL63 RNA NPH QL NAA+NON-PROBE: NOT DETECTED
HCOV OC43 RNA NPH QL NAA+NON-PROBE: NOT DETECTED
HCT VFR BLD AUTO: 30.1 % (ref 37–47)
HCT VFR BLD AUTO: 35.2 % (ref 37–47)
HGB BLD-MCNC: 10.5 GM/DL (ref 12–16)
HGB BLD-MCNC: 9 GM/DL (ref 12–16)
HMPV RNA NPH QL NAA+NON-PROBE: NOT DETECTED
HPIV1 RNA NPH QL NAA+NON-PROBE: NOT DETECTED
HPIV2 RNA NPH QL NAA+NON-PROBE: NOT DETECTED
HPIV3 RNA NPH QL NAA+NON-PROBE: NOT DETECTED
HPIV4 RNA NPH QL NAA+NON-PROBE: NOT DETECTED
IMM GRANULOCYTES # BLD AUTO: 1.74 X10(3)/MCL (ref 0–0.04)
IMM GRANULOCYTES # BLD AUTO: 2.44 X10(3)/MCL (ref 0–0.04)
IMM GRANULOCYTES NFR BLD AUTO: 11 %
IMM GRANULOCYTES NFR BLD AUTO: 9.5 %
INR BLD: 5.3 (ref 0–1.3)
INR BLD: 5.48 (ref 0–1.3)
INSTRUMENT WBC (OLG): 18.3 X10(3)/MCL
INSTRUMENT WBC (OLG): 22.3 X10(3)/MCL
LACTATE SERPL-SCNC: 2.6 MMOL/L (ref 0.5–2.2)
LYMPHOCYTES NFR BLD MANUAL: 0.73 X10(3)/MCL
LYMPHOCYTES NFR BLD MANUAL: 1.11 X10(3)/MCL
LYMPHOCYTES NFR BLD MANUAL: 4 %
LYMPHOCYTES NFR BLD MANUAL: 5 %
M PNEUMO DNA NPH QL NAA+NON-PROBE: NOT DETECTED
MAGNESIUM SERPL-MCNC: 1.5 MG/DL (ref 1.6–2.6)
MAGNESIUM SERPL-MCNC: 2.2 MG/DL (ref 1.6–2.6)
MCH RBC QN AUTO: 23.5 PG (ref 27–31)
MCH RBC QN AUTO: 23.6 PG (ref 27–31)
MCHC RBC AUTO-ENTMCNC: 29.8 MG/DL (ref 33–36)
MCHC RBC AUTO-ENTMCNC: 29.9 MG/DL (ref 33–36)
MCV RBC AUTO: 78.8 FL (ref 80–94)
MCV RBC AUTO: 78.9 FL (ref 80–94)
MONOCYTES NFR BLD MANUAL: 0.22 X10(3)/MCL (ref 0.1–1.3)
MONOCYTES NFR BLD MANUAL: 0.92 X10(3)/MCL (ref 0.1–1.3)
MONOCYTES NFR BLD MANUAL: 1 %
MONOCYTES NFR BLD MANUAL: 5 %
NEUTROPHILS NFR BLD MANUAL: 90 %
NEUTROPHILS NFR BLD MANUAL: 94 %
NRBC BLD AUTO-RTO: 0 %
NRBC BLD AUTO-RTO: 0 %
OVALOCYTES (OLG): ABNORMAL
PHOSPHATE SERPL-MCNC: 2.1 MG/DL (ref 2.3–4.7)
PLATELET # BLD AUTO: 253 X10(3)/MCL (ref 130–400)
PLATELET # BLD AUTO: 295 X10(3)/MCL (ref 130–400)
PLATELET # BLD EST: NORMAL 10*3/UL
PLATELET # BLD EST: NORMAL 10*3/UL
PMV BLD AUTO: 10.4 FL (ref 7.4–10.4)
PMV BLD AUTO: 10.5 FL (ref 7.4–10.4)
POIKILOCYTOSIS BLD QL SMEAR: ABNORMAL
POIKILOCYTOSIS BLD QL SMEAR: ABNORMAL
POTASSIUM SERPL-SCNC: 2.7 MMOL/L (ref 3.5–5.1)
POTASSIUM SERPL-SCNC: 3.5 MMOL/L (ref 3.5–5.1)
PROT SERPL-MCNC: 5.5 GM/DL (ref 6.4–8.3)
PROT SERPL-MCNC: 7 GM/DL (ref 6.4–8.3)
PROTHROMBIN TIME: 47.3 SECONDS (ref 12.5–14.5)
PROTHROMBIN TIME: 48.5 SECONDS (ref 12.5–14.5)
RBC # BLD AUTO: 3.82 X10(6)/MCL (ref 4.2–5.4)
RBC # BLD AUTO: 4.46 X10(6)/MCL (ref 4.2–5.4)
RBC MORPH BLD: ABNORMAL
RBC MORPH BLD: ABNORMAL
RSV RNA NPH QL NAA+NON-PROBE: NOT DETECTED
RV+EV RNA NPH QL NAA+NON-PROBE: NOT DETECTED
SODIUM SERPL-SCNC: 139 MMOL/L (ref 136–145)
SODIUM SERPL-SCNC: 140 MMOL/L (ref 136–145)
TROPONIN I SERPL-MCNC: 0.02 NG/ML (ref 0–0.04)
WBC # SPEC AUTO: 18.3 X10(3)/MCL (ref 4.5–11.5)
WBC # SPEC AUTO: 22.3 X10(3)/MCL (ref 4.5–11.5)

## 2022-12-01 PROCEDURE — 11000001 HC ACUTE MED/SURG PRIVATE ROOM

## 2022-12-01 PROCEDURE — 21400001 HC TELEMETRY ROOM

## 2022-12-01 PROCEDURE — 83735 ASSAY OF MAGNESIUM: CPT | Performed by: NURSE PRACTITIONER

## 2022-12-01 PROCEDURE — 25000003 PHARM REV CODE 250: Performed by: INTERNAL MEDICINE

## 2022-12-01 PROCEDURE — 25000003 PHARM REV CODE 250: Performed by: NURSE PRACTITIONER

## 2022-12-01 PROCEDURE — 83605 ASSAY OF LACTIC ACID: CPT | Performed by: EMERGENCY MEDICINE

## 2022-12-01 PROCEDURE — 85610 PROTHROMBIN TIME: CPT | Performed by: NURSE PRACTITIONER

## 2022-12-01 PROCEDURE — 84100 ASSAY OF PHOSPHORUS: CPT | Performed by: NURSE PRACTITIONER

## 2022-12-01 PROCEDURE — 63600175 PHARM REV CODE 636 W HCPCS: Performed by: EMERGENCY MEDICINE

## 2022-12-01 PROCEDURE — 63600175 PHARM REV CODE 636 W HCPCS: Performed by: NURSE PRACTITIONER

## 2022-12-01 PROCEDURE — 25000003 PHARM REV CODE 250: Performed by: EMERGENCY MEDICINE

## 2022-12-01 PROCEDURE — 85027 COMPLETE CBC AUTOMATED: CPT | Performed by: NURSE PRACTITIONER

## 2022-12-01 PROCEDURE — 80053 COMPREHEN METABOLIC PANEL: CPT | Performed by: NURSE PRACTITIONER

## 2022-12-01 PROCEDURE — 83735 ASSAY OF MAGNESIUM: CPT | Performed by: EMERGENCY MEDICINE

## 2022-12-01 RX ORDER — BENZONATATE 100 MG/1
100 CAPSULE ORAL 3 TIMES DAILY PRN
Status: DISCONTINUED | OUTPATIENT
Start: 2022-12-01 | End: 2022-12-05 | Stop reason: HOSPADM

## 2022-12-01 RX ORDER — IBUPROFEN 200 MG
24 TABLET ORAL
Status: DISCONTINUED | OUTPATIENT
Start: 2022-12-01 | End: 2022-12-05 | Stop reason: HOSPADM

## 2022-12-01 RX ORDER — SODIUM CHLORIDE, SODIUM LACTATE, POTASSIUM CHLORIDE, CALCIUM CHLORIDE 600; 310; 30; 20 MG/100ML; MG/100ML; MG/100ML; MG/100ML
INJECTION, SOLUTION INTRAVENOUS CONTINUOUS
Status: DISCONTINUED | OUTPATIENT
Start: 2022-12-01 | End: 2022-12-03

## 2022-12-01 RX ORDER — ZOLPIDEM TARTRATE 5 MG/1
5 TABLET ORAL NIGHTLY
Status: DISCONTINUED | OUTPATIENT
Start: 2022-12-01 | End: 2022-12-02

## 2022-12-01 RX ORDER — IPRATROPIUM BROMIDE AND ALBUTEROL SULFATE 2.5; .5 MG/3ML; MG/3ML
3 SOLUTION RESPIRATORY (INHALATION) EVERY 8 HOURS
Status: DISCONTINUED | OUTPATIENT
Start: 2022-12-02 | End: 2022-12-02

## 2022-12-01 RX ORDER — ONDANSETRON 2 MG/ML
4 INJECTION INTRAMUSCULAR; INTRAVENOUS EVERY 8 HOURS PRN
Status: DISCONTINUED | OUTPATIENT
Start: 2022-12-01 | End: 2022-12-05 | Stop reason: HOSPADM

## 2022-12-01 RX ORDER — GUAIFENESIN/DEXTROMETHORPHAN 100-10MG/5
5 SYRUP ORAL EVERY 4 HOURS PRN
Status: DISCONTINUED | OUTPATIENT
Start: 2022-12-01 | End: 2022-12-05 | Stop reason: HOSPADM

## 2022-12-01 RX ORDER — QUETIAPINE FUMARATE 100 MG/1
400 TABLET, FILM COATED ORAL NIGHTLY
Status: DISCONTINUED | OUTPATIENT
Start: 2022-12-01 | End: 2022-12-05 | Stop reason: HOSPADM

## 2022-12-01 RX ORDER — HYDROCODONE BITARTRATE AND ACETAMINOPHEN 5; 325 MG/1; MG/1
1 TABLET ORAL EVERY 6 HOURS PRN
Status: DISCONTINUED | OUTPATIENT
Start: 2022-12-01 | End: 2022-12-05 | Stop reason: HOSPADM

## 2022-12-01 RX ORDER — ONDANSETRON 2 MG/ML
4 INJECTION INTRAMUSCULAR; INTRAVENOUS
Status: COMPLETED | OUTPATIENT
Start: 2022-12-01 | End: 2022-12-01

## 2022-12-01 RX ORDER — MAGNESIUM SULFATE HEPTAHYDRATE 40 MG/ML
2 INJECTION, SOLUTION INTRAVENOUS
Status: COMPLETED | OUTPATIENT
Start: 2022-12-01 | End: 2022-12-01

## 2022-12-01 RX ORDER — ACETAMINOPHEN 325 MG/1
650 TABLET ORAL EVERY 8 HOURS PRN
Status: DISCONTINUED | OUTPATIENT
Start: 2022-12-01 | End: 2022-12-05 | Stop reason: HOSPADM

## 2022-12-01 RX ORDER — GLUCAGON 1 MG
1 KIT INJECTION
Status: DISCONTINUED | OUTPATIENT
Start: 2022-12-01 | End: 2022-12-05 | Stop reason: HOSPADM

## 2022-12-01 RX ORDER — IBUPROFEN 200 MG
16 TABLET ORAL
Status: DISCONTINUED | OUTPATIENT
Start: 2022-12-01 | End: 2022-12-05 | Stop reason: HOSPADM

## 2022-12-01 RX ORDER — ACETAMINOPHEN 325 MG/1
650 TABLET ORAL EVERY 4 HOURS PRN
Status: DISCONTINUED | OUTPATIENT
Start: 2022-12-01 | End: 2022-12-05 | Stop reason: HOSPADM

## 2022-12-01 RX ORDER — FENTANYL CITRATE 50 UG/ML
50 INJECTION, SOLUTION INTRAMUSCULAR; INTRAVENOUS
Status: COMPLETED | OUTPATIENT
Start: 2022-12-01 | End: 2022-12-01

## 2022-12-01 RX ORDER — POTASSIUM CHLORIDE 14.9 MG/ML
20 INJECTION INTRAVENOUS
Status: COMPLETED | OUTPATIENT
Start: 2022-12-01 | End: 2022-12-01

## 2022-12-01 RX ORDER — POTASSIUM CHLORIDE 20 MEQ/1
20 TABLET, EXTENDED RELEASE ORAL ONCE
Status: COMPLETED | OUTPATIENT
Start: 2022-12-01 | End: 2022-12-01

## 2022-12-01 RX ADMIN — POTASSIUM CHLORIDE 20 MEQ: 14.9 INJECTION, SOLUTION INTRAVENOUS at 12:12

## 2022-12-01 RX ADMIN — BENZONATATE 100 MG: 100 CAPSULE ORAL at 09:12

## 2022-12-01 RX ADMIN — SODIUM CHLORIDE, POTASSIUM CHLORIDE, SODIUM LACTATE AND CALCIUM CHLORIDE 1000 ML: 600; 310; 30; 20 INJECTION, SOLUTION INTRAVENOUS at 12:12

## 2022-12-01 RX ADMIN — QUETIAPINE FUMARATE 400 MG: 100 TABLET ORAL at 11:12

## 2022-12-01 RX ADMIN — AZITHROMYCIN MONOHYDRATE 500 MG: 500 INJECTION, POWDER, LYOPHILIZED, FOR SOLUTION INTRAVENOUS at 12:12

## 2022-12-01 RX ADMIN — MAGNESIUM SULFATE HEPTAHYDRATE 2 G: 40 INJECTION, SOLUTION INTRAVENOUS at 02:12

## 2022-12-01 RX ADMIN — ONDANSETRON 4 MG: 2 INJECTION INTRAMUSCULAR; INTRAVENOUS at 01:12

## 2022-12-01 RX ADMIN — POTASSIUM CHLORIDE 20 MEQ: 1500 TABLET, EXTENDED RELEASE ORAL at 12:12

## 2022-12-01 RX ADMIN — ACETAMINOPHEN 650 MG: 325 TABLET, FILM COATED ORAL at 06:12

## 2022-12-01 RX ADMIN — CEFTRIAXONE SODIUM 1 G: 1 INJECTION, POWDER, FOR SOLUTION INTRAMUSCULAR; INTRAVENOUS at 11:12

## 2022-12-01 RX ADMIN — FENTANYL CITRATE 50 MCG: 50 INJECTION, SOLUTION INTRAMUSCULAR; INTRAVENOUS at 01:12

## 2022-12-01 RX ADMIN — HYDROCODONE BITARTRATE AND ACETAMINOPHEN 1 TABLET: 5; 325 TABLET ORAL at 09:12

## 2022-12-01 RX ADMIN — CEFTRIAXONE SODIUM 1 G: 1 INJECTION, POWDER, FOR SOLUTION INTRAMUSCULAR; INTRAVENOUS at 12:12

## 2022-12-01 RX ADMIN — SODIUM CHLORIDE, POTASSIUM CHLORIDE, SODIUM LACTATE AND CALCIUM CHLORIDE: 600; 310; 30; 20 INJECTION, SOLUTION INTRAVENOUS at 09:12

## 2022-12-01 RX ADMIN — ZOLPIDEM TARTRATE 5 MG: 5 TABLET ORAL at 11:12

## 2022-12-01 RX ADMIN — BENZONATATE 100 MG: 100 CAPSULE ORAL at 10:12

## 2022-12-01 RX ADMIN — HYDROCODONE BITARTRATE AND ACETAMINOPHEN 1 TABLET: 5; 325 TABLET ORAL at 08:12

## 2022-12-01 NOTE — H&P
[facility] Hospital Medicine History & Physical Examination       Patient Name: Holly Starr  MRN: [id number]  Patient Class: IP- Inpatient   Admission Date: 12/01/2022   Admitting Service: Hospital Medicine   Length of Stay: 0  Attending Physician: Dr. Richardson  Primary Care Provider: Priti Mesa MD  Face-to-Face encounter date: 12/01/2022  Code Status: Full  Chief Complaint: Shortness of Breath (Sob since yesterday. Fever at work went to [facility]. Neg for covid/flu. DC home. Fever of 101 today, went to urgent care and was diagnosed with pneumonia. )    Source of Information: Patient. Medical Records      HISTORY OF PRESENT ILLNESS:   Holly Starr is a 52 y.o. female with a PMHx of Pulmonary HTN, HLD, PAD s/p stents, Psoriatic arthritis on Skyrizi, VHD s/p mechanical AVR on Coumadin, Hx of B DVT/PE, Anemia, Anxiety/Depression, GERD, obesity who presented to [facility] on 11/30/2022 with c/o cough, SOB and fever x1 day. Cough is nonproductive.   Initial ED VS include a BP 89/58, , RR 20, SpO2 95% on room air, temperature 98° F.  Labs were notable for WBC 22.3, hemoglobin 10.5, hematocrit 35.2, INR 5.48, potassium 2.7, CO2 18, BUN 31.3, creatinine 1.39, magnesium 1.5, .7, lactic acid 2.6. Urinalysis unremarkable.  CXR revealed a right infrahilar opacification, left mid lung zone opacification.  Blood cultures x2 obtained.  Started on IV antibiotics.  Also received 2 g magnesium sulfate IV, IV fluids, potassium chloride 20 mEq IV and 20 mEq p.o., fentanyl 50 mcg IV and Zofran 4 mg IV while in the ED.  She is been admitted to hospital medicine services for further workup and management of care.    Of note she was seen at [facility] with  chills difficulty urinating, reported she was exposed to influenza by a co-worker. She was negative for flu, strep, and COVID and diagnosed with pneumonia, discharged home on Xofluza and promethazine DM.   She was then seen at  urgent care on 11/30/2022 with same complaints, CXR revealed bilateral opacities, possible pneumonia; she was prescribed doxycycline p.o., albuterol inhaler and prednisone p.o., also received ceftriaxone 1 g at urgent care.    REVIEW OF SYSTEMS:   Except as documented, all other systems reviewed and negative     PAST MEDICAL HISTORY:   Pulmonary HTN, HLD, PAD s/p stents, Psoriatic arthritis on Skyrizi, VHD s/p mechanical AVR on Coumadin, Hx of B DVT/PE, Anemia, Anxiety/Depression, GERD,  obesity, history of nephrolithiasis    PAST SURGICAL HISTORY:   Cholecystectomy, hysterectomy, tonsillectomy, 5th, gastric bypass, foot surgery, mechanical  aortic valve replacement, colonoscopy, cystoscopy,  vaginal laceration repair    FAMILY HISTORY:   Reviewed and negative    SOCIAL HISTORY:   Denied alcohol, tobacco or illicit drug use    ALLERGIES:   Prednisone, Mirtazapine, and Niacin    HOME MEDICATIONS:     Prior to Admission medications    Medication Sig Start Date End Date Taking? Authorizing Provider   albuterol (VENTOLIN HFA) 90 mcg/actuation inhaler Inhale 1 puff into the lungs every 4 (four) hours as needed for Wheezing or Shortness of Breath. 11/30/22 12/7/22  HEIDE Mckee   clobetasoL (TEMOVATE) 0.05 % external solution Apply 0.05 application topically daily as needed. 12/9/21   Historical Provider   doxycycline (MONODOX) 100 MG capsule Take 1 capsule (100 mg total) by mouth 2 (two) times daily. With food and water. 11/30/22   HEIDE Mckee   enoxaparin (LOVENOX) 100 mg/mL Syrg Inject 100 mg into the skin once daily. (for 7 days.)    Historical Provider   estradioL (ESTRACE) 0.5 MG tablet Take 1 tablet (0.5 mg total) by mouth once daily. 10/19/22 10/19/23  Priti Mesa MD   ferrous sulfate 325 (65 FE) MG EC tablet Take 325 mg by mouth once daily at 6am. 12/1/21   Historical Provider   FLOWFLEX COVID-19 AG HOME TEST Kit  11/30/22   Historical Provider   furosemide (LASIX) 40 MG tablet Take 40 mg  by mouth once daily.    Historical Provider   hyoscyamine (LEVSIN/SL) 0.125 mg Subl Place 0.125 mg under the tongue daily as needed. 2/7/22   Historical Provider   methocarbamoL (ROBAXIN) 750 MG Tab Take 2 tablets (1,500 mg total) by mouth 3 (three) times daily. 10/19/22   Priti Mesa MD   metoprolol succinate (TOPROL-XL) 25 MG 24 hr tablet Take 25 mg by mouth once daily.    Historical Provider   pantoprazole (PROTONIX) 40 MG tablet Take 1 tablet (40 mg total) by mouth once daily. 10/19/22 10/19/23  Priti Mesa MD   predniSONE (DELTASONE) 10 MG tablet 40mg (4 tabs) day 1. 30mg (3 tabs) day 2; 20mg (2 tabs) day 3. 10mg (1 tab) day 4. Take with food and water no later than 5pm. 11/30/22   HEIDE Mckee   promethazine-dextromethorphan (PROMETHAZINE-DM) 6.25-15 mg/5 mL Syrp Take 5 mLs by mouth every 6 (six) hours as needed (cough). 11/29/22 12/9/22  JONATHAN Hall   QUEtiapine (SEROQUEL) 400 MG tablet Take 400 mg by mouth every evening.    Historical Provider   rosuvastatin (CRESTOR) 20 MG tablet Take 20 mg by mouth every evening.    Historical Provider   sertraline (ZOLOFT) 100 MG tablet Take 200 mg by mouth once daily.    Historical Provider   SKYRIZI 150 mg/mL PnIj Inject into the skin. 10/1/22   Historical Provider   spirometers and accessories Asya 10 deep inhalations every hour while you are awake for the next 7 days. 11/30/22   HEIDE Mckee   sucralfate (CARAFATE) 100 mg/mL suspension Take 1 g by mouth daily as needed. 2/7/22   Historical Provider   temazepam (RESTORIL) 15 mg Cap Take 15 mg by mouth nightly as needed.    Historical Provider   warfarin (COUMADIN) 3 MG tablet Take 3 mg by mouth once daily.    Historical Provider     ________________________________________________________________________  INPATIENT LIST OF MEDICATIONS     Current Facility-Administered Medications:     acetaminophen tablet 650 mg, 650 mg, Oral, Q4H PRN, Aaron Hummel MD, 650 mg at 12/01/22 0612     acetaminophen tablet 650 mg, 650 mg, Oral, Q8H PRN, Romi Leigh, AGACNP-BC    azithromycin (ZITHROMAX) 500 mg in dextrose 5 % 250 mL IVPB, 500 mg, Intravenous, Q24H, HEIDE Sandoval    cefTRIAXone (ROCEPHIN) 1 g in dextrose 5 % in water (D5W) 5 % 50 mL IVPB (MB+), 1 g, Intravenous, Q24H, HEIDE Sandoval    glucagon (human recombinant) injection 1 mg, 1 mg, Intramuscular, PRN, Romi Leigh, AGACNP-BC    glucose chewable tablet 16 g, 16 g, Oral, PRN, Romi NESS Doumit, AGACNP-BC    glucose chewable tablet 24 g, 24 g, Oral, PRN, Romi Minat, AGACNP-BC    HYDROcodone-acetaminophen 5-325 mg per tablet 1 tablet, 1 tablet, Oral, Q6H PRN, JEREMIAH Carreno-BC    lactated ringers bolus 1,000 mL, 1,000 mL, Intravenous, ED 1 Time, Quique Nava MD    ondansetron injection 4 mg, 4 mg, Intravenous, Q8H PRN, JEREMIAH Carreno-BC    Current Outpatient Medications:     albuterol (VENTOLIN HFA) 90 mcg/actuation inhaler, Inhale 1 puff into the lungs every 4 (four) hours as needed for Wheezing or Shortness of Breath., Disp: 18 g, Rfl: 0    clobetasoL (TEMOVATE) 0.05 % external solution, Apply 0.05 application topically daily as needed., Disp: , Rfl:     doxycycline (MONODOX) 100 MG capsule, Take 1 capsule (100 mg total) by mouth 2 (two) times daily. With food and water., Disp: 14 capsule, Rfl: 0    enoxaparin (LOVENOX) 100 mg/mL Syrg, Inject 100 mg into the skin once daily. (for 7 days.), Disp: , Rfl:     estradioL (ESTRACE) 0.5 MG tablet, Take 1 tablet (0.5 mg total) by mouth once daily., Disp: 30 tablet, Rfl: 11    ferrous sulfate 325 (65 FE) MG EC tablet, Take 325 mg by mouth once daily at 6am., Disp: , Rfl:     FLOWFLEX COVID-19 AG HOME TEST Kit, , Disp: , Rfl:     furosemide (LASIX) 40 MG tablet, Take 40 mg by mouth once daily., Disp: , Rfl:     hyoscyamine (LEVSIN/SL) 0.125 mg Subl, Place 0.125 mg under the tongue daily as needed., Disp: , Rfl:     methocarbamoL (ROBAXIN) 750 MG Tab, Take 2  tablets (1,500 mg total) by mouth 3 (three) times daily., Disp: 90 tablet, Rfl: 1    metoprolol succinate (TOPROL-XL) 25 MG 24 hr tablet, Take 25 mg by mouth once daily., Disp: , Rfl:     pantoprazole (PROTONIX) 40 MG tablet, Take 1 tablet (40 mg total) by mouth once daily., Disp: 30 tablet, Rfl: 11    predniSONE (DELTASONE) 10 MG tablet, 40mg (4 tabs) day 1. 30mg (3 tabs) day 2; 20mg (2 tabs) day 3. 10mg (1 tab) day 4. Take with food and water no later than 5pm., Disp: 10 tablet, Rfl: 0    promethazine-dextromethorphan (PROMETHAZINE-DM) 6.25-15 mg/5 mL Syrp, Take 5 mLs by mouth every 6 (six) hours as needed (cough)., Disp: 118 mL, Rfl: 0    QUEtiapine (SEROQUEL) 400 MG tablet, Take 400 mg by mouth every evening., Disp: , Rfl:     rosuvastatin (CRESTOR) 20 MG tablet, Take 20 mg by mouth every evening., Disp: , Rfl:     sertraline (ZOLOFT) 100 MG tablet, Take 200 mg by mouth once daily., Disp: , Rfl:     SKYRIZI 150 mg/mL PnIj, Inject into the skin., Disp: , Rfl:     spirometers and accessories Asya, 10 deep inhalations every hour while you are awake for the next 7 days., Disp: 1 each, Rfl: 0    sucralfate (CARAFATE) 100 mg/mL suspension, Take 1 g by mouth daily as needed., Disp: , Rfl:     temazepam (RESTORIL) 15 mg Cap, Take 15 mg by mouth nightly as needed., Disp: , Rfl:     warfarin (COUMADIN) 3 MG tablet, Take 3 mg by mouth once daily., Disp: , Rfl:     Scheduled Meds:   azithromycin (ZITHROMAX) 500 mg IVPB  500 mg Intravenous Q24H    cefTRIAXone (ROCEPHIN) IVPB  1 g Intravenous Q24H    lactated ringers  1,000 mL Intravenous ED 1 Time     Continuous Infusions:  PRN Meds:.acetaminophen, acetaminophen, glucagon (human recombinant), glucose, glucose, HYDROcodone-acetaminophen, ondansetron    PHYSICAL EXAM:     VITAL SIGNS: 24 HRS MIN & MAX LAST   Temp  Min: 98 °F (36.7 °C)  Max: 98.6 °F (37 °C) 98.6 °F (37 °C)   BP  Min: 77/49  Max: 114/52 (!) 114/52     Pulse  Min: 100  Max: 114  103   Resp  Min: 17  Max: 26 (!)  26     SpO2  Min: 95 %  Max: 100 % 100 %       General appearance: Well-developed female in no apparent distress.  HENT: Atraumatic head. Moist mucous membranes of oral cavity.  Eyes: Normal extraocular movements.   Neck: Supple.   Lungs: Rhonchi to auscultation bilaterally.   Heart: Regular rate and rhythm. S1 and S2 present. No pedal edema.  Abdomen: Soft, non-distended, non-tender. Obese  Extremities: No cyanosis, clubbing, or edema.  Skin: No Rash.   Neuro: Motor and sensory exams grossly intact.   Psych/mental status: Appropriate mood and affect. Responds appropriately to questions.     LABS AND IMAGING:     Recent Labs   Lab 11/30/22 2326   WBC 22.3*   RBC 4.46   HGB 10.5*   HCT 35.2*   MCV 78.9*   MCH 23.5*   MCHC 29.8*   RDW 16.3      MPV 10.5*       Recent Labs   Lab 11/30/22 2326 12/01/22  0027     --    K 2.7*  --    CO2 18*  --    BUN 31.3*  --    CREATININE 1.39*  --    CALCIUM 9.8  --    MG  --  1.50*   ALBUMIN 3.5  --    ALKPHOS 92  --    ALT 25  --    AST 25  --    BILITOT 0.5  --        Microbiology Results (last 7 days)       ** No results found for the last 168 hours. **             X-Ray Chest AP Portable  Narrative: EXAMINATION:  XR CHEST AP PORTABLE    CLINICAL HISTORY:  SOB;    TECHNIQUE:  Single view of the chest    COMPARISON:  11/30/2022    FINDINGS:  Right infrahilar opacification remains.  The cardiomediastinal silhouette is unchanged.  Left mid lung zone opacification is unchanged.  No acute osseous abnormality.  Recommend further evaluation with CT of the chest on a nonemergent basis.  Impression: Right infrahilar opacification remains. The cardiomediastinal silhouette is unchanged. Left mid lung zone opacification is unchanged. No acute osseous abnormality.  Recommend further evaluation with CT of the chest on a nonemergent basis.    Electronically signed by: Emmanuel Santos  Date:    12/01/2022  Time:    07:48        ASSESSMENT & PLAN:   Bilateral pneumonia   Sepsis    Lactic acidosis   Leukocytosis   Hypokalemia  Hypomagnesemia   LUZ  Metabolic acidosis   Supratherapeutic INR   History of DVT / PE on Coumadin   VHD s/p mechanical AVR on Coumadin   Chronic anemia  on oral iron supplementation  Hx of Pulmonary HTN, HLD, PAD s/p stents, Psoriatic arthritis on Skyrizi, Anxiety/Depression, GERD,  obesity, history of nephrolithiasis    Plan:  IV antibiotics -azithromycin and ceftriaxone  P.r.n. antitussives  P.r.n. antiemetics, analgesics and antiemetics  Incentive spirometry   mL/hr   Follow blood cultures   Respiratory culture respiratory PCR or   Resume appropriate home medications once updated   Labs in a.m.      VTE Prophylaxis: Coumadin    Discharge Planning and Disposition: TBD    I, Romi Leigh, NP have reviewed and discussed the case with Dr. Richardson.   Please see the attending MD's addendum for further assessment and plan.    Romi Leigh, AGACNP-BC  12/01/2022    _______________________________________________________________________________      MD Addendum:  I, Dr. Richardson assumed care of this patient today, 12/01/2022.  For the patient encounter, I performed the substantive portion of the visit, I reviewed the NP/PA documentation, treatment plan, and medical decision making.  I had face to face time with this patient      A. History:   Patient was shortness of breath and fever, presented to several different clinics and was told that she had pneumonia.  Extensive medical history as noted above.    Chest x-ray revealed infiltrates without pleural effusions/cardiomegaly identified.  Patient currently being treated for bilateral pneumonia  Patient was out associated complaints, nonproductive cough, no chest pain no abdominal pain no nausea no vomiting no diarrhea reported.    B. Physical exam:   Walking in the keller returning to her room, minimal increase in breathing, no inspiratory expiratory wheezing appreciated.    Neuro:  Alert awake oriented,  comprehension is intact.  General: Appears comfortable, no acute distress.  Integumentary: Warm, dry, intact.  Musculoskeletal: Purposeful movement noted.   Respiratory: Breath sounds are equal.  Cardiovascular: Regular rate. No peripheral edema.    C. Medical decision making:  Continue treatment for pneumonia the antibiotic therapy, supportive care and supplemental oxygen as needed.  Follow-up on blood cultures.  Lab breathing treatments and incentive spirometer.  Oxygen probe defective, nurse notify.  Maintain oxygen saturation greater than 92%.  Guaifenesin Tessalon Perles ordered p.r.n.  CT of the chest to further evaluate x-ray findings.  Patient with microcytic anemia-will check iron profile, to H&H transfuse if less than 8 given history of cardiac disease.  Supratherapeutic INR, hold Coumadin, repeat PT/INR daily and restart within 24-48 hours pending INR   Hypokalemic-continue LR, status post IV and oral potassium supplementation.  Continue telemetry recheck blood chemistry in a.m..      All diagnosis and differential diagnosis have been reviewed; assessment and plan has been documented; I have personally reviewed the labs and test results that are presently available; I have reviewed the patients medication list; I have reviewed the consulting providers response and recommendations. I have reviewed or attempted to review medical records based upon their availability.    All of the patient and family questions have been addressed and answered. Patient's is agreeable to the above stated plan. I will continue to monitor closely and make adjustments to medical management as needed.     Dr. Claudia Richardson DO   12/01/2022 12/01/2022

## 2022-12-01 NOTE — ED NOTES
Assumed care of pt, report received from Rafaela HOLLOWAY. Pt AAOx3, nadn, respirations full and even. Voiced no complaints at present. SR up x2, HOB elevated, call light in reach. Will continue to monitor.

## 2022-12-01 NOTE — ED PROVIDER NOTES
Encounter Date: 11/30/2022       History     Chief Complaint   Patient presents with    Shortness of Breath     Sob since yesterday. Fever at work went to  [facility]. Neg for covid/flu. DC home. Fever of 101 today, went to urgent care and was diagnosed with pneumonia.      This is a 52-year-old female who has a history of mechanical aortic valve replacement in 2009 and also history of chronic DVT and PE on Coumadin along with pulmonary hypertension.  Patient said that yesterday she started having a cough and some shortness of breath and fever she was seen at [facility] and negative flu and COVID test.  Subsequent to that she was feeling worse and went to an urgent care they diagnosed her with a pneumonia.  Patient says that she is been having a dry cough has been nonproductive but has had a fever.    Review of patient's allergies indicates:   Allergen Reactions    Prednisone      Other reaction(s): heart racing  heart race  heart race      Mirtazapine Anxiety and Other (See Comments)     Causes nerves to be on fire like fibromyalgia pain.  Other reaction(s): nerves on fire    Niacin Rash     Other reaction(s): Tachycardia, tacycardia     Past Medical History:   Diagnosis Date    Anemia     Anticoagulated on Coumadin     Anxiety     DVT, bilateral lower limbs     Hyperlipidemia     Insomnia     Kidney stone     PAD (peripheral artery disease)     Psoriatic arthritis     Pulmonary HTN     Urge incontinence      Past Surgical History:   Procedure Laterality Date    CHOLECYSTECTOMY      COLONOSCOPY  05/30/2019    cytoscopy  11/15/2019    fluoroscopy of Left heart  08/01/2018    FOOT FRACTURE SURGERY  2015    FUNCTIONAL ENDOSCOPIC SINUS SURGERY (FESS)      GASTRIC BYPASS  10/01/2010    HYSTERECTOMY      LEFT HEART CATHETERIZATION  08/01/2018    mammogram  04/17/2019    TONSILLECTOMY      VAGINAL HYSTERECTOMY W/ ANTERIOR AND POSTERIOR VAGINAL REPAIR  10/21/2020     No family history on file.  Social  History     Tobacco Use    Smoking status: Never    Smokeless tobacco: Never   Substance Use Topics    Alcohol use: Yes     Comment: occassionally    Drug use: Never     Review of Systems   Constitutional:  Positive for fever. Negative for chills, diaphoresis and fatigue.   HENT:  Negative for congestion and trouble swallowing.    Eyes:  Negative for pain and discharge.   Respiratory:  Positive for cough. Negative for wheezing.    Cardiovascular:  Negative for chest pain and leg swelling.   Gastrointestinal:  Negative for abdominal pain, diarrhea, nausea and vomiting.   Genitourinary:  Negative for dysuria, flank pain, vaginal bleeding and vaginal discharge.   Musculoskeletal:  Negative for arthralgias.   Skin:  Negative for color change.   Neurological:  Negative for syncope, speech difficulty and weakness.   Psychiatric/Behavioral:  Negative for agitation and confusion.    All other systems reviewed and are negative.    Physical Exam     Initial Vitals   BP Pulse Resp Temp SpO2   11/30/22 2258 11/30/22 2258 11/30/22 2258 11/30/22 2258 11/30/22 2250   (!) 89/58 (!) 114 20 98 °F (36.7 °C) 97 %      MAP       --                Physical Exam    HENT:   Head: Normocephalic.   Eyes: EOM are normal. Left eye exhibits no discharge. No scleral icterus.   Cardiovascular:  Regular rhythm.           Pulmonary/Chest: No stridor. No respiratory distress.   Abdominal: She exhibits no distension.   Musculoskeletal:         General: Normal range of motion.     Neurological: She is alert and oriented to person, place, and time. She has normal strength.   Skin: Skin is dry. No rash noted. No erythema. No pallor.   Psychiatric: She has a normal mood and affect. Her behavior is normal. Judgment and thought content normal.       ED Course   Critical Care    Date/Time: 12/1/2022 3:22 AM  Performed by: Quique Nava MD  Authorized by: Quique Nava MD   Total critical care time (exclusive of procedural time) : 45  minutes  Critical care time was exclusive of teaching time and separately billable procedures and treating other patients.  Critical care was time spent personally by me on the following activities: discussions with primary provider, interpretation of cardiac output measurements, evaluation of patient's response to treatment, examination of patient, ordering and review of laboratory studies, ordering and review of radiographic studies, pulse oximetry and re-evaluation of patient's condition.      Labs Reviewed   COMPREHENSIVE METABOLIC PANEL - Abnormal; Notable for the following components:       Result Value    Potassium Level 2.7 (*)     Carbon Dioxide 18 (*)     Glucose Level 110 (*)     Blood Urea Nitrogen 31.3 (*)     Creatinine 1.39 (*)     Albumin/Globulin Ratio 1.0 (*)     All other components within normal limits   B-TYPE NATRIURETIC PEPTIDE - Abnormal; Notable for the following components:    Natriuretic Peptide 399.7 (*)     All other components within normal limits   PROTIME-INR - Abnormal; Notable for the following components:    PT 48.5 (*)     INR 5.48 (*)     All other components within normal limits   CBC WITH DIFFERENTIAL - Abnormal; Notable for the following components:    WBC 22.3 (*)     Hgb 10.5 (*)     Hct 35.2 (*)     MCV 78.9 (*)     MCH 23.5 (*)     MCHC 29.8 (*)     MPV 10.5 (*)     IG# 2.44 (*)     All other components within normal limits   LACTIC ACID, PLASMA - Abnormal; Notable for the following components:    Lactic Acid Level 2.6 (*)     All other components within normal limits   MANUAL DIFFERENTIAL - Abnormal; Notable for the following components:    Abs Neut 20.962 (*)     RBC Morph Abnormal (*)     Poik 1+ (*)     Ovalocytes 1+ (*)     Monica Cells 1+ (*)     All other components within normal limits   MAGNESIUM - Abnormal; Notable for the following components:    Magnesium Level 1.50 (*)     All other components within normal limits   TROPONIN I - Normal   CBC W/ AUTO DIFFERENTIAL     Narrative:     The following orders were created for panel order CBC auto differential.  Procedure                               Abnormality         Status                     ---------                               -----------         ------                     CBC with Differential[[id number]]        Abnormal            Final result               Manual Differential[[id number]]          Abnormal            Final result                 Please view results for these tests on the individual orders.     EKG Readings: (Independently Interpreted)   Sinus tach rate of 109 no acute ST elevation is a first-degree block with occasional PVCs and nonspecific intraventricular block with widening of the QRS diffusely.  There is some slight ST depression in the inferior and lateral leads with some T-wave inversions in V6.  Time was 10:51 p.m.     Imaging Results              X-Ray Chest AP Portable (In process)  Result time 11/30/22 23:06:05                  X-Rays:   Independently Interpreted Readings:   Other Readings:  Per radiologist's report there are bilateral infiltrates- possible pna  Medications   lactated ringers bolus 1,000 mL (has no administration in time range)   magnesium sulfate 2g in water 50mL IVPB (premix) (2 g Intravenous New Bag 12/1/22 0237)   lactated ringers bolus 1,000 mL (0 mLs Intravenous Stopped 12/1/22 0056)   lactated ringers bolus 1,000 mL (0 mLs Intravenous Stopped 12/1/22 0115)   cefTRIAXone (ROCEPHIN) 1 g in dextrose 5 % in water (D5W) 5 % 50 mL IVPB (MB+) (0 g Intravenous Stopped 12/1/22 0115)   azithromycin 500 mg in dextrose 5 % 250 mL IVPB (ready to mix system) (0 mg Intravenous Stopped 12/1/22 0230)   potassium chloride 20 mEq in 100 mL IVPB (FOR CENTRAL LINE ADMINISTRATION ONLY) (20 mEq Intravenous New Bag 12/1/22 0030)   potassium chloride SA CR tablet 20 mEq (20 mEq Oral Given 12/1/22 0030)   fentaNYL injection 50 mcg (50 mcg Intravenous Given 12/1/22 0157)   ondansetron injection 4 mg  (4 mg Intravenous Given 12/1/22 0156)                              Clinical Impression:   Final diagnoses:  [R06.02] SOB (shortness of breath)  [J18.9] Pneumonia of both lungs due to infectious organism, unspecified part of lung (Primary)        ED Disposition Condition    Admit Stable                Quique Nava MD  12/01/22 0329

## 2022-12-01 NOTE — TELEPHONE ENCOUNTER
----- Message from HEIDE Mckee sent at 12/1/2022  9:55 AM CST -----  Holly, your viral panel is negative.  If you get any worse, please go to the Emergency Room closest to you, not an urgent care. If you have not already started your antibiotic, start it now.  How are you feeling today?

## 2022-12-02 LAB
ALBUMIN SERPL-MCNC: 2.7 GM/DL (ref 3.5–5)
ALBUMIN/GLOB SERPL: 0.9 RATIO (ref 1.1–2)
ALP SERPL-CCNC: 95 UNIT/L (ref 40–150)
ALT SERPL-CCNC: 18 UNIT/L (ref 0–55)
AST SERPL-CCNC: 15 UNIT/L (ref 5–34)
BASOPHILS # BLD AUTO: 0.04 X10(3)/MCL (ref 0–0.2)
BASOPHILS NFR BLD AUTO: 0.3 %
BILIRUBIN DIRECT+TOT PNL SERPL-MCNC: 0.4 MG/DL
BUN SERPL-MCNC: 14.2 MG/DL (ref 9.8–20.1)
CALCIUM SERPL-MCNC: 9.2 MG/DL (ref 8.4–10.2)
CHLORIDE SERPL-SCNC: 112 MMOL/L (ref 98–107)
CO2 SERPL-SCNC: 23 MMOL/L (ref 22–29)
CREAT SERPL-MCNC: 0.73 MG/DL (ref 0.55–1.02)
EOSINOPHIL # BLD AUTO: 0.11 X10(3)/MCL (ref 0–0.9)
EOSINOPHIL NFR BLD AUTO: 0.8 %
ERYTHROCYTE [DISTWIDTH] IN BLOOD BY AUTOMATED COUNT: 16.8 % (ref 11.5–17)
GFR SERPLBLD CREATININE-BSD FMLA CKD-EPI: >60 MLS/MIN/1.73/M2
GLOBULIN SER-MCNC: 3.1 GM/DL (ref 2.4–3.5)
GLUCOSE SERPL-MCNC: 84 MG/DL (ref 74–100)
HCT VFR BLD AUTO: 29 % (ref 37–47)
HGB BLD-MCNC: 8.5 GM/DL (ref 12–16)
IMM GRANULOCYTES # BLD AUTO: 0.43 X10(3)/MCL (ref 0–0.04)
IMM GRANULOCYTES NFR BLD AUTO: 3 %
INR BLD: 3.08 (ref 0–1.3)
LYMPHOCYTES # BLD AUTO: 1.76 X10(3)/MCL (ref 0.6–4.6)
LYMPHOCYTES NFR BLD AUTO: 12.5 %
MCH RBC QN AUTO: 23.8 PG (ref 27–31)
MCHC RBC AUTO-ENTMCNC: 29.3 MG/DL (ref 33–36)
MCV RBC AUTO: 81.2 FL (ref 80–94)
MONOCYTES # BLD AUTO: 0.67 X10(3)/MCL (ref 0.1–1.3)
MONOCYTES NFR BLD AUTO: 4.7 %
NEUTROPHILS # BLD AUTO: 11.1 X10(3)/MCL (ref 2.1–9.2)
NEUTROPHILS NFR BLD AUTO: 78.7 %
NRBC BLD AUTO-RTO: 0 %
PLATELET # BLD AUTO: 268 X10(3)/MCL (ref 130–400)
PMV BLD AUTO: 10.4 FL (ref 7.4–10.4)
POTASSIUM SERPL-SCNC: 3.6 MMOL/L (ref 3.5–5.1)
PROT SERPL-MCNC: 5.8 GM/DL (ref 6.4–8.3)
PROTHROMBIN TIME: 31.1 SECONDS (ref 12.5–14.5)
RBC # BLD AUTO: 3.57 X10(6)/MCL (ref 4.2–5.4)
SODIUM SERPL-SCNC: 140 MMOL/L (ref 136–145)
WBC # SPEC AUTO: 14.1 X10(3)/MCL (ref 4.5–11.5)

## 2022-12-02 PROCEDURE — 25000242 PHARM REV CODE 250 ALT 637 W/ HCPCS: Performed by: STUDENT IN AN ORGANIZED HEALTH CARE EDUCATION/TRAINING PROGRAM

## 2022-12-02 PROCEDURE — 25000003 PHARM REV CODE 250: Performed by: NURSE PRACTITIONER

## 2022-12-02 PROCEDURE — 87070 CULTURE OTHR SPECIMN AEROBIC: CPT | Performed by: NURSE PRACTITIONER

## 2022-12-02 PROCEDURE — 21400001 HC TELEMETRY ROOM

## 2022-12-02 PROCEDURE — 85025 COMPLETE CBC W/AUTO DIFF WBC: CPT | Performed by: NURSE PRACTITIONER

## 2022-12-02 PROCEDURE — 80053 COMPREHEN METABOLIC PANEL: CPT | Performed by: NURSE PRACTITIONER

## 2022-12-02 PROCEDURE — 94761 N-INVAS EAR/PLS OXIMETRY MLT: CPT

## 2022-12-02 PROCEDURE — 11000001 HC ACUTE MED/SURG PRIVATE ROOM

## 2022-12-02 PROCEDURE — 63600175 PHARM REV CODE 636 W HCPCS: Performed by: NURSE PRACTITIONER

## 2022-12-02 PROCEDURE — 36415 COLL VENOUS BLD VENIPUNCTURE: CPT | Performed by: NURSE PRACTITIONER

## 2022-12-02 PROCEDURE — 94640 AIRWAY INHALATION TREATMENT: CPT

## 2022-12-02 PROCEDURE — 25000003 PHARM REV CODE 250: Performed by: INTERNAL MEDICINE

## 2022-12-02 PROCEDURE — 85610 PROTHROMBIN TIME: CPT | Performed by: STUDENT IN AN ORGANIZED HEALTH CARE EDUCATION/TRAINING PROGRAM

## 2022-12-02 RX ORDER — SERTRALINE HYDROCHLORIDE 50 MG/1
200 TABLET, FILM COATED ORAL DAILY
Status: DISCONTINUED | OUTPATIENT
Start: 2022-12-03 | End: 2022-12-05 | Stop reason: HOSPADM

## 2022-12-02 RX ORDER — LANOLIN ALCOHOL/MO/W.PET/CERES
1 CREAM (GRAM) TOPICAL DAILY
Status: DISCONTINUED | OUTPATIENT
Start: 2022-12-03 | End: 2022-12-05 | Stop reason: HOSPADM

## 2022-12-02 RX ORDER — ATORVASTATIN CALCIUM 40 MG/1
80 TABLET, FILM COATED ORAL DAILY
Status: DISCONTINUED | OUTPATIENT
Start: 2022-12-03 | End: 2022-12-05 | Stop reason: HOSPADM

## 2022-12-02 RX ORDER — METOPROLOL SUCCINATE 25 MG/1
25 TABLET, EXTENDED RELEASE ORAL DAILY
Status: DISCONTINUED | OUTPATIENT
Start: 2022-12-03 | End: 2022-12-05 | Stop reason: HOSPADM

## 2022-12-02 RX ORDER — PANTOPRAZOLE SODIUM 40 MG/1
40 TABLET, DELAYED RELEASE ORAL DAILY
Status: DISCONTINUED | OUTPATIENT
Start: 2022-12-03 | End: 2022-12-05 | Stop reason: HOSPADM

## 2022-12-02 RX ADMIN — IPRATROPIUM BROMIDE AND ALBUTEROL SULFATE 3 ML: 2.5; .5 SOLUTION RESPIRATORY (INHALATION) at 04:12

## 2022-12-02 RX ADMIN — GUAIFENESIN AND DEXTROMETHORPHAN 5 ML: 100; 10 SYRUP ORAL at 05:12

## 2022-12-02 RX ADMIN — HYDROCODONE BITARTRATE AND ACETAMINOPHEN 1 TABLET: 5; 325 TABLET ORAL at 10:12

## 2022-12-02 RX ADMIN — IPRATROPIUM BROMIDE AND ALBUTEROL SULFATE 3 ML: 2.5; .5 SOLUTION RESPIRATORY (INHALATION) at 08:12

## 2022-12-02 RX ADMIN — IPRATROPIUM BROMIDE AND ALBUTEROL SULFATE 3 ML: 2.5; .5 SOLUTION RESPIRATORY (INHALATION) at 12:12

## 2022-12-02 RX ADMIN — GUAIFENESIN AND DEXTROMETHORPHAN 5 ML: 100; 10 SYRUP ORAL at 10:12

## 2022-12-02 RX ADMIN — QUETIAPINE FUMARATE 400 MG: 100 TABLET ORAL at 09:12

## 2022-12-02 RX ADMIN — AZITHROMYCIN MONOHYDRATE 500 MG: 500 INJECTION, POWDER, LYOPHILIZED, FOR SOLUTION INTRAVENOUS at 12:12

## 2022-12-02 RX ADMIN — HYDROCODONE BITARTRATE AND ACETAMINOPHEN 1 TABLET: 5; 325 TABLET ORAL at 05:12

## 2022-12-02 RX ADMIN — CEFTRIAXONE SODIUM 1 G: 1 INJECTION, POWDER, FOR SOLUTION INTRAMUSCULAR; INTRAVENOUS at 11:12

## 2022-12-02 RX ADMIN — BENZONATATE 100 MG: 100 CAPSULE ORAL at 05:12

## 2022-12-02 RX ADMIN — ACETAMINOPHEN 650 MG: 325 TABLET, FILM COATED ORAL at 09:12

## 2022-12-02 NOTE — PROGRESS NOTES
Hospital Medicine  Progress Note    Patient Name: Holly Starr  MRN: [id number]  Status: IP- Inpatient   Admission Date: 11/30/2022  Length of Stay: 1      CC: hospital follow-up for pneumonia       SUBJECTIVE   52 y.o. 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 Work up in ED noted BP 89/58, , RR 20, SpO2 95% on room air, temperature 98° F.  Labs were notable for WBC 22.3, hemoglobin 10.5, hematocrit 35.2, INR 5.48, potassium 2.7, CO2 18, BUN 31.3, creatinine 1.39, magnesium 1.5.  lactic acid 2.6.  CXR revealed a right infrahilar opacification, left mid lung zone opacification, new from prior films.  Pressures were improved IV fluid resuscitation.  Patient  admitted to hospital medicine services for further workup and management.  Of note she was seen at [facility] 11/29 with  chills difficulty urinating, reported she was exposed to influenza by a co-worker. She was negative for flu, strep, and COVID and diagnosed with pneumonia, discharged home on oral doxycycline.    Today: Patient seen and examined at bedside, and chart reviewed.  Notes improvement in symptoms.  White count is improving.      MEDICATIONS   Scheduled   albuterol-ipratropium  3 mL Nebulization Q8H    azithromycin (ZITHROMAX) 500 mg IVPB  500 mg Intravenous Q24H    cefTRIAXone (ROCEPHIN) IVPB  1 g Intravenous Q24H    QUEtiapine  400 mg Oral QHS     Continuous Infusions   lactated ringers 100 mL/hr at 12/01/22 0927         PHYSICAL EXAM   VITALS: T 99.3 °F (37.4 °C)   /62   P 100   RR 17   O2 (!) 94 %    GENERAL: Awake and in NAD  LUNGS: Scattered rhonchi bilaterally, decreased air movement through, poor inpsiratory effort  CVS: Normal rate  GI/: Soft, NT, bowel sounds positive.  EXTREMITIES: No peripheral edema  NEURO: AAOx3  PSYCH: Cooperative      LABS   CBC  Recent Labs      12/02/22  0539   WBC 14.1*   HGB 8.5*   HCT 29.0*         CHEM  Recent Labs     12/01/22  0910 12/02/22  0539    140   K 3.5 3.6   MG 2.20  --    CO2 20* 23   BUN 24.1* 14.2   CREATININE 0.89 0.73   CALCIUM 9.1 9.2   BILITOT 0.4 0.4   AST 21 15   ALT 22 18   ALKPHOS 88 95   ALBUMIN 3.0* 2.7*   PHOS 2.1*  --        MICROBIOLOGY     Microbiology Results (last 7 days)       Procedure Component Value Units Date/Time    Respiratory Culture [[id number]] Collected: 12/02/22 0447    Order Status: Completed Specimen: Sputum, Expectorated Updated: 12/02/22 1032     GRAM STAIN Quality 3+      Few Gram Positive Rods      Rare Yeast      Rare Gram Negative Rods            ASSESSMENT   Bilateral CAP  Severe sepsis  (POA)  LUZ s/t IVVD  h/o VHD s/p mechanical AVR on Coumadin, on Coumadin, with supratherapeutic INR  h/o Psoriatic arthritis on Skyrizi  Immunosuppressed secondary to above  Hypokalemia  Hypomagnesemia  Chronic DANAE, on oral supplementation  h/o Pulmonary HTN    PLAN   Continue with current antibiotics, will follow respiratory culture  Continue gentle IV hydration  Continue to monitor electrolytes, and replace as indicated  Repeat CBC, BMP in AM  Will hold Coumadin another night, repeat INR in AM, plan to start Coumadin if INR < 3.  Otherwise continue current management and monitoring        Prophylaxis: Coumadin management as above        Irving Parikh MD  [facility]

## 2022-12-03 LAB
ANION GAP SERPL CALC-SCNC: 6 MEQ/L
BASOPHILS # BLD AUTO: 0.04 X10(3)/MCL (ref 0–0.2)
BASOPHILS NFR BLD AUTO: 0.4 %
BUN SERPL-MCNC: 6.9 MG/DL (ref 9.8–20.1)
CALCIUM SERPL-MCNC: 8.9 MG/DL (ref 8.4–10.2)
CHLORIDE SERPL-SCNC: 113 MMOL/L (ref 98–107)
CO2 SERPL-SCNC: 21 MMOL/L (ref 22–29)
CREAT SERPL-MCNC: 0.68 MG/DL (ref 0.55–1.02)
CREAT/UREA NIT SERPL: 10
EOSINOPHIL # BLD AUTO: 0.1 X10(3)/MCL (ref 0–0.9)
EOSINOPHIL NFR BLD AUTO: 1 %
ERYTHROCYTE [DISTWIDTH] IN BLOOD BY AUTOMATED COUNT: 16.8 % (ref 11.5–17)
GFR SERPLBLD CREATININE-BSD FMLA CKD-EPI: >60 MLS/MIN/1.73/M2
GLUCOSE SERPL-MCNC: 94 MG/DL (ref 74–100)
HCT VFR BLD AUTO: 27 % (ref 37–47)
HGB BLD-MCNC: 8.2 GM/DL (ref 12–16)
IMM GRANULOCYTES # BLD AUTO: 0.11 X10(3)/MCL (ref 0–0.04)
IMM GRANULOCYTES NFR BLD AUTO: 1.1 %
INR BLD: 2.16 (ref 0–1.3)
LYMPHOCYTES # BLD AUTO: 1.3 X10(3)/MCL (ref 0.6–4.6)
LYMPHOCYTES NFR BLD AUTO: 13.5 %
MCH RBC QN AUTO: 23.6 PG (ref 27–31)
MCHC RBC AUTO-ENTMCNC: 30.4 MG/DL (ref 33–36)
MCV RBC AUTO: 77.8 FL (ref 80–94)
MONOCYTES # BLD AUTO: 0.7 X10(3)/MCL (ref 0.1–1.3)
MONOCYTES NFR BLD AUTO: 7.3 %
NEUTROPHILS # BLD AUTO: 7.4 X10(3)/MCL (ref 2.1–9.2)
NEUTROPHILS NFR BLD AUTO: 76.7 %
NRBC BLD AUTO-RTO: 0 %
PLATELET # BLD AUTO: 220 X10(3)/MCL (ref 130–400)
PMV BLD AUTO: 9.9 FL (ref 7.4–10.4)
POTASSIUM SERPL-SCNC: 3.6 MMOL/L (ref 3.5–5.1)
PROTHROMBIN TIME: 23.7 SECONDS (ref 12.5–14.5)
RBC # BLD AUTO: 3.47 X10(6)/MCL (ref 4.2–5.4)
SODIUM SERPL-SCNC: 140 MMOL/L (ref 136–145)
WBC # SPEC AUTO: 9.6 X10(3)/MCL (ref 4.5–11.5)

## 2022-12-03 PROCEDURE — 85610 PROTHROMBIN TIME: CPT | Performed by: INTERNAL MEDICINE

## 2022-12-03 PROCEDURE — 25000003 PHARM REV CODE 250: Performed by: NURSE PRACTITIONER

## 2022-12-03 PROCEDURE — 36415 COLL VENOUS BLD VENIPUNCTURE: CPT | Performed by: INTERNAL MEDICINE

## 2022-12-03 PROCEDURE — 21400001 HC TELEMETRY ROOM

## 2022-12-03 PROCEDURE — 80048 BASIC METABOLIC PNL TOTAL CA: CPT | Performed by: INTERNAL MEDICINE

## 2022-12-03 PROCEDURE — 25000003 PHARM REV CODE 250: Performed by: INTERNAL MEDICINE

## 2022-12-03 PROCEDURE — 63600175 PHARM REV CODE 636 W HCPCS: Performed by: NURSE PRACTITIONER

## 2022-12-03 PROCEDURE — 85025 COMPLETE CBC W/AUTO DIFF WBC: CPT | Performed by: INTERNAL MEDICINE

## 2022-12-03 RX ORDER — NYSTATIN 100000 [USP'U]/ML
500000 SUSPENSION ORAL 4 TIMES DAILY
Status: DISCONTINUED | OUTPATIENT
Start: 2022-12-03 | End: 2022-12-05 | Stop reason: HOSPADM

## 2022-12-03 RX ORDER — ZOLPIDEM TARTRATE 5 MG/1
5 TABLET ORAL NIGHTLY PRN
Status: DISCONTINUED | OUTPATIENT
Start: 2022-12-04 | End: 2022-12-05 | Stop reason: HOSPADM

## 2022-12-03 RX ADMIN — WARFARIN SODIUM 3 MG: 2 TABLET ORAL at 04:12

## 2022-12-03 RX ADMIN — QUETIAPINE FUMARATE 400 MG: 100 TABLET ORAL at 09:12

## 2022-12-03 RX ADMIN — METOPROLOL SUCCINATE 25 MG: 25 TABLET, EXTENDED RELEASE ORAL at 09:12

## 2022-12-03 RX ADMIN — GUAIFENESIN AND DEXTROMETHORPHAN 5 ML: 100; 10 SYRUP ORAL at 09:12

## 2022-12-03 RX ADMIN — AZITHROMYCIN MONOHYDRATE 500 MG: 500 INJECTION, POWDER, LYOPHILIZED, FOR SOLUTION INTRAVENOUS at 01:12

## 2022-12-03 RX ADMIN — SERTRALINE HYDROCHLORIDE 200 MG: 50 TABLET ORAL at 09:12

## 2022-12-03 RX ADMIN — ACETAMINOPHEN 650 MG: 325 TABLET, FILM COATED ORAL at 09:12

## 2022-12-03 RX ADMIN — NYSTATIN 500000 UNITS: 100000 SUSPENSION ORAL at 04:12

## 2022-12-03 RX ADMIN — NYSTATIN 500000 UNITS: 100000 SUSPENSION ORAL at 09:12

## 2022-12-03 RX ADMIN — GUAIFENESIN AND DEXTROMETHORPHAN 5 ML: 100; 10 SYRUP ORAL at 06:12

## 2022-12-03 RX ADMIN — HYDROCODONE BITARTRATE AND ACETAMINOPHEN 1 TABLET: 5; 325 TABLET ORAL at 06:12

## 2022-12-03 RX ADMIN — ACETAMINOPHEN 650 MG: 325 TABLET ORAL at 04:12

## 2022-12-03 RX ADMIN — FERROUS SULFATE TAB 325 MG (65 MG ELEMENTAL FE) 1 EACH: 325 (65 FE) TAB at 09:12

## 2022-12-03 RX ADMIN — ATORVASTATIN CALCIUM 80 MG: 40 TABLET, FILM COATED ORAL at 09:12

## 2022-12-03 RX ADMIN — PANTOPRAZOLE SODIUM 40 MG: 40 TABLET, DELAYED RELEASE ORAL at 09:12

## 2022-12-03 NOTE — PROGRESS NOTES
Hospital Medicine  Progress Note    Patient Name: Holly Starr  MRN: [id number]  Status: IP- Inpatient   Admission Date: 11/30/2022  Length of Stay: 2      CC: hospital follow-up for pneumonia       SUBJECTIVE   52 y.o. 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 Work up in ED noted BP 89/58, , RR 20, SpO2 95% on room air, temperature 98° F.  Labs were notable for WBC 22.3, hemoglobin 10.5, hematocrit 35.2, INR 5.48, potassium 2.7, CO2 18, BUN 31.3, creatinine 1.39, magnesium 1.5.  lactic acid 2.6.  CXR revealed a right infrahilar opacification, left mid lung zone opacification, new from prior films.  Pressures were improved IV fluid resuscitation.  Patient  admitted to hospital medicine services for further workup and management.  Of note she was seen at [facility] 11/29 with  chills difficulty urinating, reported she was exposed to influenza by a co-worker. She was negative for flu, strep, and COVID and diagnosed with pneumonia, discharged home on oral doxycycline.    Today: Patient seen and examined at bedside, and chart reviewed.  Had low-grade overnight.  Otherwise continues to improve, white count normalizing.      MEDICATIONS   Scheduled   atorvastatin  80 mg Oral Daily    azithromycin (ZITHROMAX) 500 mg IVPB  500 mg Intravenous Q24H    cefTRIAXone (ROCEPHIN) IVPB  1 g Intravenous Q24H    ferrous sulfate  1 tablet Oral Daily    metoprolol succinate  25 mg Oral Daily    nystatin  500,000 Units Oral QID    pantoprazole  40 mg Oral Daily    QUEtiapine  400 mg Oral QHS    sertraline  200 mg Oral Daily    warfarin  3 mg Oral Daily     Continuous Infusions   lactated ringers 50 mL/hr at 12/02/22 1559       PHYSICAL EXAM   VITALS: T 99.7 °F (37.6 °C)   /67   P (!) 186   RR 17   O2 97 %    GENERAL: Awake and in NAD  LUNGS: Scattered rhonchi bilaterally,  decreased air movement through, poor inpsiratory effort  CVS: Normal rate  GI/: Soft, NT, bowel sounds positive.  EXTREMITIES: No peripheral edema  NEURO: AAOx3  PSYCH: Cooperative      LABS   CBC  Recent Labs     12/03/22  0736   WBC 9.6   HGB 8.2*   HCT 27.0*         CHEM  Recent Labs     12/01/22  0910 12/02/22  0539 12/03/22  0736    140 140   K 3.5 3.6 3.6   MG 2.20  --   --    CO2 20* 23 21*   BUN 24.1* 14.2 6.9*   CREATININE 0.89 0.73 0.68   CALCIUM 9.1 9.2 8.9   BILITOT 0.4 0.4  --    AST 21 15  --    ALT 22 18  --    ALKPHOS 88 95  --    ALBUMIN 3.0* 2.7*  --    PHOS 2.1*  --   --        MICROBIOLOGY     Microbiology Results (last 7 days)       Procedure Component Value Units Date/Time    Respiratory Culture [[id number]]  (Abnormal) Collected: 12/02/22 0447    Order Status: Completed Specimen: Sputum, Expectorated Updated: 12/03/22 0808     Respiratory Culture Moderate Yeast     Comment: with rare normal respiratory remi        GRAM STAIN Quality 3+      Few Gram Positive Rods      Rare Yeast      Rare Gram Negative Rods          ASSESSMENT   Bilateral CAP  Severe sepsis, improved  LUZ s/t IVVD  h/o VHD s/p mechanical AVR on Coumadin, on Coumadin, with supratherapeutic INR  h/o Psoriatic arthritis on Skyrizi  Immunosuppressed secondary to above  Oropharyngeal candidiasis  Hypokalemia  Hypomagnesemia  Chronic DANAE, on oral supplementation  h/o Pulmonary HTN    PLAN   Continue with current antibiotics, plan to transition to oral on discharge  Start nystatin swish and swallow  Can discontinue IV fluids  Repeat CBC, BMP in AM  Repeat INR, plan to start Coumadin if INR < 3.  Otherwise continue current management and monitoring  If stable overnight, would expect discharge in AM        Prophylaxis: Coumadin management as above        Ivring Parikh MD  [facility]

## 2022-12-04 LAB
BACTERIA SPEC CULT: ABNORMAL
GRAM STN SPEC: ABNORMAL

## 2022-12-04 PROCEDURE — 87040 BLOOD CULTURE FOR BACTERIA: CPT | Performed by: INTERNAL MEDICINE

## 2022-12-04 PROCEDURE — 36415 COLL VENOUS BLD VENIPUNCTURE: CPT | Performed by: INTERNAL MEDICINE

## 2022-12-04 PROCEDURE — 25000003 PHARM REV CODE 250: Performed by: NURSE PRACTITIONER

## 2022-12-04 PROCEDURE — 63600175 PHARM REV CODE 636 W HCPCS: Performed by: NURSE PRACTITIONER

## 2022-12-04 PROCEDURE — 21400001 HC TELEMETRY ROOM

## 2022-12-04 PROCEDURE — 25000003 PHARM REV CODE 250: Performed by: INTERNAL MEDICINE

## 2022-12-04 RX ADMIN — ZOLPIDEM TARTRATE 5 MG: 5 TABLET ORAL at 12:12

## 2022-12-04 RX ADMIN — NYSTATIN 500000 UNITS: 100000 SUSPENSION ORAL at 08:12

## 2022-12-04 RX ADMIN — SERTRALINE HYDROCHLORIDE 200 MG: 50 TABLET ORAL at 09:12

## 2022-12-04 RX ADMIN — WARFARIN SODIUM 3 MG: 2 TABLET ORAL at 05:12

## 2022-12-04 RX ADMIN — NYSTATIN 500000 UNITS: 100000 SUSPENSION ORAL at 05:12

## 2022-12-04 RX ADMIN — DOXYCYCLINE 100 MG: 100 INJECTION, POWDER, LYOPHILIZED, FOR SOLUTION INTRAVENOUS at 12:12

## 2022-12-04 RX ADMIN — HYDROCODONE BITARTRATE AND ACETAMINOPHEN 1 TABLET: 5; 325 TABLET ORAL at 08:12

## 2022-12-04 RX ADMIN — CEFTRIAXONE SODIUM 1 G: 1 INJECTION, POWDER, FOR SOLUTION INTRAMUSCULAR; INTRAVENOUS at 12:12

## 2022-12-04 RX ADMIN — METOPROLOL SUCCINATE 25 MG: 25 TABLET, EXTENDED RELEASE ORAL at 09:12

## 2022-12-04 RX ADMIN — PANTOPRAZOLE SODIUM 40 MG: 40 TABLET, DELAYED RELEASE ORAL at 09:12

## 2022-12-04 RX ADMIN — GUAIFENESIN AND DEXTROMETHORPHAN 5 ML: 100; 10 SYRUP ORAL at 08:12

## 2022-12-04 RX ADMIN — AZITHROMYCIN MONOHYDRATE 500 MG: 500 INJECTION, POWDER, LYOPHILIZED, FOR SOLUTION INTRAVENOUS at 01:12

## 2022-12-04 RX ADMIN — ATORVASTATIN CALCIUM 80 MG: 40 TABLET, FILM COATED ORAL at 09:12

## 2022-12-04 RX ADMIN — HYDROCODONE BITARTRATE AND ACETAMINOPHEN 1 TABLET: 5; 325 TABLET ORAL at 02:12

## 2022-12-04 RX ADMIN — FERROUS SULFATE TAB 325 MG (65 MG ELEMENTAL FE) 1 EACH: 325 (65 FE) TAB at 09:12

## 2022-12-04 RX ADMIN — NYSTATIN 500000 UNITS: 100000 SUSPENSION ORAL at 09:12

## 2022-12-04 RX ADMIN — ZOLPIDEM TARTRATE 5 MG: 5 TABLET ORAL at 08:12

## 2022-12-04 RX ADMIN — QUETIAPINE FUMARATE 400 MG: 100 TABLET ORAL at 08:12

## 2022-12-04 RX ADMIN — NYSTATIN 500000 UNITS: 100000 SUSPENSION ORAL at 12:12

## 2022-12-04 RX ADMIN — DOXYCYCLINE 100 MG: 100 INJECTION, POWDER, LYOPHILIZED, FOR SOLUTION INTRAVENOUS at 11:12

## 2022-12-04 NOTE — PROGRESS NOTES
Hospital Medicine  Progress Note    Patient Name: Holly Starr  MRN: [id number]  Status: IP- Inpatient   Admission Date: 11/30/2022  Length of Stay: 3      CC: hospital follow-up for pneumonia       SUBJECTIVE   52 y.o. 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 Work up in ED noted BP 89/58, , RR 20, SpO2 95% on room air, temperature 98° F.  Labs were notable for WBC 22.3, hemoglobin 10.5, hematocrit 35.2, INR 5.48, potassium 2.7, CO2 18, BUN 31.3, creatinine 1.39, magnesium 1.5.  lactic acid 2.6.  CXR revealed a right infrahilar opacification, left mid lung zone opacification, new from prior films.  Pressures were improved IV fluid resuscitation.  Patient  admitted to hospital medicine services for further workup and management.  Of note she was seen at [facility] 11/29 with  chills difficulty urinating, reported she was exposed to influenza by a co-worker. She was negative for flu, strep, and COVID and diagnosed with pneumonia, discharged home on oral doxycycline.    Today: Patient seen and examined at bedside, and chart reviewed.  another fever overnight, up to 101.9 F.  Symptomatically seems improved however.    MEDICATIONS   Scheduled   atorvastatin  80 mg Oral Daily    cefTRIAXone (ROCEPHIN) IVPB  1 g Intravenous Q24H    doxycycline (VIBRAMYCIN) IVPB  100 mg Intravenous Q12H    ferrous sulfate  1 tablet Oral Daily    metoprolol succinate  25 mg Oral Daily    nystatin  500,000 Units Oral QID    pantoprazole  40 mg Oral Daily    QUEtiapine  400 mg Oral QHS    sertraline  200 mg Oral Daily    warfarin  3 mg Oral Daily     Continuous Infusions  None       PHYSICAL EXAM   VITALS: T 98.5 °F (36.9 °C)   /71   P 85   RR 16   O2 98 %    GENERAL: Awake and in NAD  LUNGS: Scattered rhonchi bilaterally, fair air movement through, poor inpsiratory effort  CVS:  Normal rate  GI/: Soft, NT, bowel sounds positive.  EXTREMITIES: No peripheral edema  NEURO: AAOx3  PSYCH: Cooperative      LABS   CBC  Recent Labs     12/03/22  0736   WBC 9.6   HGB 8.2*   HCT 27.0*         CHEM  Recent Labs     12/02/22  0539 12/03/22  0736    140   K 3.6 3.6   CO2 23 21*   BUN 14.2 6.9*   CREATININE 0.73 0.68   CALCIUM 9.2 8.9   BILITOT 0.4  --    AST 15  --    ALT 18  --    ALKPHOS 95  --    ALBUMIN 2.7*  --        MICROBIOLOGY     Microbiology Results (last 7 days)       Procedure Component Value Units Date/Time    Respiratory Culture [[id number]]  (Abnormal) Collected: 12/02/22 0447    Order Status: Completed Specimen: Sputum, Expectorated Updated: 12/04/22 0727     Respiratory Culture Moderate Yeast     Comment: with rare normal respiratory remi        GRAM STAIN Quality 3+      Few Gram Positive Rods      Rare Yeast      Rare Gram Negative Rods          ASSESSMENT   Bilateral CAP  Severe sepsis, improved  LUZ s/t IVVD  h/o VHD s/p mechanical AVR on Coumadin, on Coumadin, with supratherapeutic INR  h/o Psoriatic arthritis on Skyrizi  Immunosuppressed secondary to above  Oropharyngeal candidiasis  Hypokalemia  Hypomagnesemia  Chronic DANAE, on oral supplementation  h/o Pulmonary HTN    PLAN   Continue with Ceftriaxone, will change azithromycin to Doxycycline  Will repeat CXR and blood cultures  Will monitor fever curve  Continue nystatin swish and swallow for thrush  Otherwise continue current management and monitoring        Prophylaxis: Coumadin management as above        Irving Parikh MD  [facility]

## 2022-12-05 VITALS
HEIGHT: 67 IN | HEART RATE: 83 BPM | OXYGEN SATURATION: 95 % | TEMPERATURE: 98 F | WEIGHT: 210 LBS | DIASTOLIC BLOOD PRESSURE: 72 MMHG | RESPIRATION RATE: 22 BRPM | SYSTOLIC BLOOD PRESSURE: 111 MMHG | BODY MASS INDEX: 32.96 KG/M2

## 2022-12-05 PROBLEM — J18.9 PNEUMONIA OF BOTH LUNGS DUE TO INFECTIOUS ORGANISM: Status: ACTIVE | Noted: 2022-12-05

## 2022-12-05 LAB
ANION GAP SERPL CALC-SCNC: 5 MEQ/L
BASOPHILS # BLD AUTO: 0.06 X10(3)/MCL (ref 0–0.2)
BASOPHILS NFR BLD AUTO: 0.8 %
BUN SERPL-MCNC: 8.4 MG/DL (ref 9.8–20.1)
CALCIUM SERPL-MCNC: 9.3 MG/DL (ref 8.4–10.2)
CHLORIDE SERPL-SCNC: 115 MMOL/L (ref 98–107)
CO2 SERPL-SCNC: 22 MMOL/L (ref 22–29)
CREAT SERPL-MCNC: 0.66 MG/DL (ref 0.55–1.02)
CREAT/UREA NIT SERPL: 13
EOSINOPHIL # BLD AUTO: 0.16 X10(3)/MCL (ref 0–0.9)
EOSINOPHIL NFR BLD AUTO: 2.1 %
ERYTHROCYTE [DISTWIDTH] IN BLOOD BY AUTOMATED COUNT: 16.7 % (ref 11.5–17)
GFR SERPLBLD CREATININE-BSD FMLA CKD-EPI: >60 MLS/MIN/1.73/M2
GLUCOSE SERPL-MCNC: 74 MG/DL (ref 74–100)
HCT VFR BLD AUTO: 29.2 % (ref 37–47)
HGB BLD-MCNC: 8.4 GM/DL (ref 12–16)
IMM GRANULOCYTES # BLD AUTO: 0.24 X10(3)/MCL (ref 0–0.04)
IMM GRANULOCYTES NFR BLD AUTO: 3.1 %
INR BLD: 3.55 (ref 0–1.3)
LYMPHOCYTES # BLD AUTO: 2.15 X10(3)/MCL (ref 0.6–4.6)
LYMPHOCYTES NFR BLD AUTO: 27.6 %
MCH RBC QN AUTO: 23.4 PG (ref 27–31)
MCHC RBC AUTO-ENTMCNC: 28.8 MG/DL (ref 33–36)
MCV RBC AUTO: 81.3 FL (ref 80–94)
MONOCYTES # BLD AUTO: 0.71 X10(3)/MCL (ref 0.1–1.3)
MONOCYTES NFR BLD AUTO: 9.1 %
NEUTROPHILS # BLD AUTO: 4.5 X10(3)/MCL (ref 2.1–9.2)
NEUTROPHILS NFR BLD AUTO: 57.3 %
NRBC BLD AUTO-RTO: 0 %
PLATELET # BLD AUTO: 263 X10(3)/MCL (ref 130–400)
PMV BLD AUTO: 10.1 FL (ref 7.4–10.4)
POTASSIUM SERPL-SCNC: 4.3 MMOL/L (ref 3.5–5.1)
PROTHROMBIN TIME: 34.7 SECONDS (ref 12.5–14.5)
RBC # BLD AUTO: 3.59 X10(6)/MCL (ref 4.2–5.4)
SODIUM SERPL-SCNC: 142 MMOL/L (ref 136–145)
WBC # SPEC AUTO: 7.8 X10(3)/MCL (ref 4.5–11.5)

## 2022-12-05 PROCEDURE — 63600175 PHARM REV CODE 636 W HCPCS: Performed by: NURSE PRACTITIONER

## 2022-12-05 PROCEDURE — 85025 COMPLETE CBC W/AUTO DIFF WBC: CPT | Performed by: INTERNAL MEDICINE

## 2022-12-05 PROCEDURE — 36415 COLL VENOUS BLD VENIPUNCTURE: CPT | Performed by: INTERNAL MEDICINE

## 2022-12-05 PROCEDURE — 25000003 PHARM REV CODE 250: Performed by: INTERNAL MEDICINE

## 2022-12-05 PROCEDURE — 25000003 PHARM REV CODE 250: Performed by: NURSE PRACTITIONER

## 2022-12-05 PROCEDURE — 80048 BASIC METABOLIC PNL TOTAL CA: CPT | Performed by: INTERNAL MEDICINE

## 2022-12-05 PROCEDURE — 85610 PROTHROMBIN TIME: CPT | Performed by: INTERNAL MEDICINE

## 2022-12-05 RX ORDER — CODEINE PHOSPHATE AND GUAIFENESIN 10; 100 MG/5ML; MG/5ML
5 SOLUTION ORAL EVERY 4 HOURS PRN
Qty: 118 ML | Refills: 0 | Status: SHIPPED | OUTPATIENT
Start: 2022-12-05 | End: 2022-12-15

## 2022-12-05 RX ORDER — NYSTATIN 100000 [USP'U]/ML
500000 SUSPENSION ORAL 4 TIMES DAILY
Qty: 140 ML | Refills: 0 | Status: SHIPPED | OUTPATIENT
Start: 2022-12-05 | End: 2022-12-12

## 2022-12-05 RX ORDER — DOXYCYCLINE 100 MG/1
100 CAPSULE ORAL 2 TIMES DAILY
Qty: 10 CAPSULE | Refills: 0 | Status: SHIPPED | OUTPATIENT
Start: 2022-12-05 | End: 2022-12-10

## 2022-12-05 RX ADMIN — CEFTRIAXONE SODIUM 1 G: 1 INJECTION, POWDER, FOR SOLUTION INTRAMUSCULAR; INTRAVENOUS at 01:12

## 2022-12-05 RX ADMIN — PANTOPRAZOLE SODIUM 40 MG: 40 TABLET, DELAYED RELEASE ORAL at 09:12

## 2022-12-05 RX ADMIN — SERTRALINE HYDROCHLORIDE 200 MG: 50 TABLET ORAL at 09:12

## 2022-12-05 RX ADMIN — METOPROLOL SUCCINATE 25 MG: 25 TABLET, EXTENDED RELEASE ORAL at 09:12

## 2022-12-05 RX ADMIN — ATORVASTATIN CALCIUM 80 MG: 40 TABLET, FILM COATED ORAL at 09:12

## 2022-12-05 RX ADMIN — FERROUS SULFATE TAB 325 MG (65 MG ELEMENTAL FE) 1 EACH: 325 (65 FE) TAB at 09:12

## 2022-12-05 RX ADMIN — NYSTATIN 500000 UNITS: 100000 SUSPENSION ORAL at 09:12

## 2022-12-05 NOTE — PLAN OF CARE
12/05/22 1100   Discharge Assessment   Assessment Type Discharge Planning Assessment   Confirmed Demographics Correct on Facesheet   Source of Information patient   Does patient/caregiver understand observation status   (inpt)   Communicated KAE with patient/caregiver Yes   Reason For Admission SOB/Pneumonia   Lives With alone   Facility Arrived From: home   Do you expect to return to your current living situation? Yes   Do you have help at home or someone to help you manage your care at home? No   Prior to hospitilization cognitive status: Alert/Oriented;No Deficits   Current cognitive status: Alert/Oriented;No Deficits   Walking or Climbing Stairs Difficulty none   Dressing/Bathing Difficulty none   Equipment Currently Used at Home none   Readmission within 30 days? No   Patient currently being followed by outpatient case management? No   Do you currently have service(s) that help you manage your care at home? No   Do you take prescription medications? Yes   Do you have prescription coverage? Yes   Who is going to help you get home at discharge? bcbs   How do you get to doctors appointments? car, drives self   Are you on dialysis? No   Discharge Plan A Home   Discharge Plan B Home   DME Needed Upon Discharge  none   Discharge Barriers Identified None   Financial Resource Strain   How hard is it for you to pay for the very basics like food, housing, medical care, and heating? Not hard   Housing Stability   In the last 12 months, was there a time when you were not able to pay the mortgage or rent on time? N   In the last 12 months, how many places have you lived?   (1)   Transportation Needs   In the past 12 months, has lack of transportation kept you from medical appointments or from getting medications? no   In the past 12 months, has lack of transportation kept you from meetings, work, or from getting things needed for daily living? No   Pt  is a , she is indep with ADL and driving, pcp is   Priti Mesa.  Uses [facility].   Discharged home today,  no needs ID

## 2022-12-05 NOTE — DISCHARGE SUMMARY
Hospital Medicine  Discharge Summary    Patient Name: Holly Starr  MRN: [id number]  Admit Date: 11/30/2022  Discharge Date: 12/5/2022   Status: IP- Inpatient   Length of Stay: 4      PHYSICIANS   Admitting Physician: Aaron Hummel MD  Discharging Physician: Irving Parikh MD.  Primary Care Physician: Priti Mesa MD  Consults: None      DISCHARGE DIAGNOSES   Bilateral CAP  Severe sepsis  LUZ s/t IVVD  h/o VHD s/p mechanical AVR on Coumadin, on Coumadin, with supratherapeutic INR  h/o Psoriatic arthritis on Skyrizi  Immunosuppressed secondary to above  Oropharyngeal candidiasis  Hypokalemia  Hypomagnesemia  Chronic DANAE, on oral supplementation  h/o Pulmonary HTN      PROCEDURES   None      HOSPITAL COURSE    52 y.o. 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    She was seen at [facility] 11/29 with  chills difficulty urinating, reported she was exposed to influenza by a co-worker. She was negative for flu, strep, and COVID and diagnosed with pneumonia, discharged home on oral doxycycline, however presented back the next day with ongoing fever.  Work up in ED noted BP 89/58, , RR 20, SpO2 95% on room air, temperature 98° F.  Labs were notable for WBC 22.3, INR 5.48, potassium 2.7, CO2 18, BUN 31.3, creatinine 1.39, magnesium 1.5.  lactic acid 2.6.  CXR revealed a right infrahilar opacification, left mid lung zone opacification, new from prior films.  Pressures were improved IV fluid resuscitation.  Patient  admitted to hospital medicine services for further workup and management.  Treated with broad spectrum antibiotics, Ceftriaxone and Azithromycin.  reviewed.  White count was improving but continued with fever as high as 101.9 F.  Symptomatically improving as well.  Azithromycin was changed to doxycycline and patient subsequently afebrile.  She  remained stable on room air.   She is stable for discharge home to complete another 4 days of doxycycline orally.  Would hold Skyrizi pending resolution of pneumonia, though her next dose is not for another month or so.  She can follow with treating physician for further management and ongoing treatment of her psoriasis.       STATUS  Improved    DISPOSITION  Discharge to home    DIET  Regular    ACTIVITY  As tolerated      FOLLOW-UP       Priti Mesa MD. Schedule an appointment as soon as possible for a visit.    Specialty: Family Medicine  Why: 12/7/22 @[facility]  Contact information:  [address] [place] [address]  [address]  [facility] [address]  [phone]                 DISCHARGE MEDICATION RECONCILIATION     PRESCRIBED     doxycycline 100 MG Cap  Commonly known as: VIBRAMYCIN  Take 1 capsule (100 mg total) by mouth 2 (two) times daily. for 5 days     guaiFENesin-codeine 100-10 mg/5 ml  mg/5 mL syrup  Commonly known as: TUSSI-ORGANIDIN NR  Take 5 mLs by mouth every 4 (four) hours as needed for Cough.     nystatin 100,000 unit/mL suspension  Commonly known as: MYCOSTATIN  Take 5 mLs (500,000 Units total) by mouth 4 (four) times daily. for 7 days       CONTINUE     albuterol 90 mcg/actuation inhaler  Commonly known as: VENTOLIN HFA  Inhale 1 puff into the lungs every 4 (four) hours as needed for Wheezing or Shortness of Breath.     clobetasoL 0.05 % external solution  Commonly known as: TEMOVATE     enoxaparin 100 mg/mL Syrg  Commonly known as: LOVENOX     estradioL 0.5 MG tablet  Commonly known as: ESTRACE  Take 1 tablet (0.5 mg total) by mouth once daily.     ferrous sulfate 325 (65 FE) MG EC tablet     FLOWFLEX COVID-19 AG HOME TEST Kit  Generic drug: COVID-19 antigen test     furosemide 40 MG tablet  Commonly known as: LASIX     hyoscyamine 0.125 mg Subl  Commonly known as: LEVSIN/SL     methocarbamoL 750 MG Tab  Commonly known as: ROBAXIN  Take 2 tablets (1,500 mg total) by mouth 3 (three) times  daily.     metoprolol succinate 25 MG 24 hr tablet  Commonly known as: TOPROL-XL     pantoprazole 40 MG tablet  Commonly known as: PROTONIX  Take 1 tablet (40 mg total) by mouth once daily.     promethazine-dextromethorphan 6.25-15 mg/5 mL Syrp  Commonly known as: PROMETHAZINE-DM  Take 5 mLs by mouth every 6 (six) hours as needed (cough).     QUEtiapine 400 MG tablet  Commonly known as: SEROQUEL     rosuvastatin 20 MG tablet  Commonly known as: CRESTOR     sertraline 100 MG tablet  Commonly known as: ZOLOFT     SKYRIZI 150 mg/mL Pnij  Generic drug: risankizumab-rzaa     spirometers and accessories Asya  10 deep inhalations every hour while you are awake for the next 7 days.     sucralfate 100 mg/mL suspension  Commonly known as: CARAFATE     temazepam 15 mg Cap  Commonly known as: RESTORIL     warfarin 3 MG tablet  Commonly known as: COUMADIN       DISCONTINUE     doxycycline 100 MG capsule  Commonly known as: MONODOX     predniSONE 10 MG tablet  Commonly known as: DELTASONE     XOFLUZA 80 mg tablet  Generic drug: baloxavir marboxiL       These medications were sent to   [facility]/pharmacy #[id number] [address]      Phone: [phone]   doxycycline 100 MG Cap  guaiFENesin-codeine 100-10 mg/5 ml  mg/5 mL syrup  nystatin 100,000 unit/mL suspension           PHYSICAL EXAM   VITALS: T 98.4 °F (36.9 °C)   /72   P 83   RR (!) 22   O2 95 %    GENERAL: Awake and in NAD  LUNGS: Scattered rhonchi bilaterally, fair air movement though  CVS: Normal rate  GI/: Soft, NT, bowel sounds positive.  EXTREMITIES: No peripheral edema  NEURO: AAOx3  PSYCH: Cooperative        Discharge time: 33 minutes     Irving Parikh MD  [facility]       DIAGNOSITCS   CBC:   Recent Labs   Lab 12/02/22  0539 12/03/22  0736 12/05/22  0536   WBC 14.1* 9.6 7.8   HGB 8.5* 8.2* 8.4*   HCT 29.0* 27.0* 29.2*    220 263     COAG:  Recent Labs   Lab 12/02/22  0539 12/03/22  1341 12/05/22  1034   INR 3.08* 2.16* 3.55*      CMP:   Recent Labs   Lab 11/30/22  2326 12/01/22  0027 12/01/22  0910 12/02/22  0539 12/03/22  0736 12/05/22  0536   CALCIUM 9.8  --  9.1 9.2 8.9 9.3   ALBUMIN 3.5  --  3.0* 2.7*  --   --      --  139 140 140 142   K 2.7*  --  3.5 3.6 3.6 4.3   CO2 18*  --  20* 23 21* 22   BUN 31.3*  --  24.1* 14.2 6.9* 8.4*   CREATININE 1.39*  --  0.89 0.73 0.68 0.66   ALKPHOS 92  --  88 95  --   --    ALT 25  --  22 18  --   --    AST 25  --  21 15  --   --    BILITOT 0.5  --  0.4 0.4  --   --    MG  --  1.50* 2.20  --   --   --    PHOS  --   --  2.1*  --   --   --      Estimated Creatinine Clearance: 120.9 mL/min (based on SCr of 0.66 mg/dL).      Microbiology Results (last 7 days)       Procedure Component Value Units Date/Time    Respiratory Culture [[id number]]  (Abnormal) Collected: 12/02/22 0447    Order Status: Completed Specimen: Sputum, Expectorated Updated: 12/04/22 0727     Respiratory Culture Moderate Yeast     Comment: with rare normal respiratory remi        GRAM STAIN Quality 3+      Few Gram Positive Rods      Rare Yeast      Rare Gram Negative Rods               X-Ray Chest PA And Lateral  Result Date: 12/4/2022  EXAMINATION: XR CHEST PA AND LATERAL CLINICAL HISTORY: pneumonia; TECHNIQUE: Two views of the chest COMPARISON: 11/30/2022 FINDINGS: Postsurgical changes of median sternotomy are unchanged.  Right hilar and left mid lung zone opacifications are slightly improved in the interval.  Recommend continued follow-up to resolution. No acute osseous abnormality.   Impression:  Postsurgical changes of median sternotomy are unchanged.  Right hilar and left mid lung zone opacifications are slightly improved in the interval. Recommend continued follow-up to resolution.   Electronically signed by: Emmanuel Santos Date:    12/04/2022 Time:    09:59    XR CHEST PA AND LATERAL  Result Date: 11/30/2022  EXAMINATION: XR CHEST PA AND LATERAL CLINICAL HISTORY: Other specified symptoms and signs involving the  circulatory and respiratory systemsdyspnea; covid/flu negative yesterday with URI symptoms; COMPARISON: 18 February 2022 FINDINGS: PA and lateral views of the chest were obtained. Median sternotomy.  There is prosthetic aortic valve.  Heart is mildly enlarged.  There are left upper lobe and right infrahilar opacities which are new compared to the prior radiographs.  No pneumothorax or significant pleural effusion.   Impression:  Bilateral opacities new compared to the prior films.  Pneumonia possible.  Recommend follow-up radiographs in 1 month to document resolution and exclude underlying mass.   Electronically signed by: Ruslan Mcguire Date:    11/30/2022 Time:    16:46    X-Ray Chest AP Portable  Result Date: 12/1/2022  EXAMINATION: XR CHEST AP PORTABLE CLINICAL HISTORY: SOB; TECHNIQUE: Single view of the chest COMPARISON: 11/30/2022 FINDINGS: Right infrahilar opacification remains.  The cardiomediastinal silhouette is unchanged.  Left mid lung zone opacification is unchanged.  No acute osseous abnormality.  Recommend further evaluation with CT of the chest on a nonemergent basis.   Impression:  Right infrahilar opacification remains. The cardiomediastinal silhouette is unchanged. Left mid lung zone opacification is unchanged. No acute osseous abnormality.  Recommend further evaluation with CT of the chest on a nonemergent basis.   Electronically signed by: Emmanuel Santos Date:    12/01/2022 Time:    07:48

## 2022-12-06 ENCOUNTER — PATIENT OUTREACH (OUTPATIENT)
Dept: ADMINISTRATIVE | Facility: CLINIC | Age: 52
End: 2022-12-06
Payer: COMMERCIAL

## 2022-12-06 NOTE — PROGRESS NOTES
C3 nurse spoke with Holly Starr  for a TCC post hospital discharge follow up call. The patient has a scheduled [facility] appointment with Priti Mesa MD  on 12/07/2022 @ 1040 am.

## 2022-12-09 LAB
BACTERIA BLD CULT: NORMAL
BACTERIA BLD CULT: NORMAL

## 2022-12-22 RX ORDER — ESTRADIOL 0.5 MG/1
TABLET ORAL
Qty: 90 TABLET | Refills: 4 | OUTPATIENT
Start: 2022-12-22

## 2022-12-22 NOTE — TELEPHONE ENCOUNTER
Does dr. Zamudio, her gyn, prescribe this for her?  With her history of dvt, it is recommended she wean off/stop hormones as this can increase her risk for clots

## 2023-01-23 PROBLEM — Z00.00 WELLNESS EXAMINATION: Status: RESOLVED | Noted: 2022-10-19 | Resolved: 2023-01-23

## 2023-03-06 PROBLEM — J18.9 PNEUMONIA OF BOTH LUNGS DUE TO INFECTIOUS ORGANISM: Status: RESOLVED | Noted: 2022-12-05 | Resolved: 2023-03-06

## 2023-05-02 ENCOUNTER — HOSPITAL ENCOUNTER (EMERGENCY)
Facility: HOSPITAL | Age: 53
Discharge: LEFT BEFORE DISCHARGE | End: 2023-05-02
Payer: COMMERCIAL

## 2023-05-02 VITALS
BODY MASS INDEX: 32.95 KG/M2 | SYSTOLIC BLOOD PRESSURE: 148 MMHG | DIASTOLIC BLOOD PRESSURE: 83 MMHG | RESPIRATION RATE: 18 BRPM | OXYGEN SATURATION: 99 % | HEIGHT: 66 IN | HEART RATE: 63 BPM | WEIGHT: 205 LBS | TEMPERATURE: 98 F

## 2023-05-02 LAB
ALBUMIN SERPL-MCNC: 3.9 G/DL (ref 3.5–5)
ALBUMIN/GLOB SERPL: 1.3 RATIO (ref 1.1–2)
ALP SERPL-CCNC: 147 UNIT/L (ref 40–150)
ALT SERPL-CCNC: 14 UNIT/L (ref 0–55)
APPEARANCE UR: ABNORMAL
AST SERPL-CCNC: 22 UNIT/L (ref 5–34)
BACTERIA #/AREA URNS AUTO: ABNORMAL /HPF
BASOPHILS # BLD AUTO: 0.04 X10(3)/MCL
BASOPHILS NFR BLD AUTO: 0.7 %
BILIRUB UR QL STRIP.AUTO: NEGATIVE MG/DL
BILIRUBIN DIRECT+TOT PNL SERPL-MCNC: 0.3 MG/DL
BUN SERPL-MCNC: 11.6 MG/DL (ref 9.8–20.1)
CALCIUM SERPL-MCNC: 9.3 MG/DL (ref 8.4–10.2)
CAOX CRY URNS QL MICRO: ABNORMAL /HPF
CHLORIDE SERPL-SCNC: 111 MMOL/L (ref 98–107)
CO2 SERPL-SCNC: 23 MMOL/L (ref 22–29)
COLOR UR AUTO: YELLOW
CREAT SERPL-MCNC: 0.75 MG/DL (ref 0.55–1.02)
EOSINOPHIL # BLD AUTO: 0.12 X10(3)/MCL (ref 0–0.9)
EOSINOPHIL NFR BLD AUTO: 2.1 %
ERYTHROCYTE [DISTWIDTH] IN BLOOD BY AUTOMATED COUNT: 18.1 % (ref 11.5–17)
GFR SERPLBLD CREATININE-BSD FMLA CKD-EPI: >60 MLS/MIN/1.73/M2
GLOBULIN SER-MCNC: 3 GM/DL (ref 2.4–3.5)
GLUCOSE SERPL-MCNC: 93 MG/DL (ref 74–100)
GLUCOSE UR QL STRIP.AUTO: NEGATIVE MG/DL
HCT VFR BLD AUTO: 32.9 % (ref 37–47)
HGB BLD-MCNC: 9.6 G/DL (ref 12–16)
IMM GRANULOCYTES # BLD AUTO: 0.01 X10(3)/MCL (ref 0–0.04)
IMM GRANULOCYTES NFR BLD AUTO: 0.2 %
KETONES UR QL STRIP.AUTO: NEGATIVE MG/DL
LEUKOCYTE ESTERASE UR QL STRIP.AUTO: NEGATIVE UNIT/L
LIPASE SERPL-CCNC: 68 U/L
LYMPHOCYTES # BLD AUTO: 2.3 X10(3)/MCL (ref 0.6–4.6)
LYMPHOCYTES NFR BLD AUTO: 40.4 %
MCH RBC QN AUTO: 22.3 PG (ref 27–31)
MCHC RBC AUTO-ENTMCNC: 29.2 G/DL (ref 33–36)
MCV RBC AUTO: 76.5 FL (ref 80–94)
MONOCYTES # BLD AUTO: 0.36 X10(3)/MCL (ref 0.1–1.3)
MONOCYTES NFR BLD AUTO: 6.3 %
NEUTROPHILS # BLD AUTO: 2.87 X10(3)/MCL (ref 2.1–9.2)
NEUTROPHILS NFR BLD AUTO: 50.3 %
NITRITE UR QL STRIP.AUTO: NEGATIVE
NRBC BLD AUTO-RTO: 0 %
PH UR STRIP.AUTO: 6 [PH]
PLATELET # BLD AUTO: 336 X10(3)/MCL (ref 130–400)
PMV BLD AUTO: 9.7 FL (ref 7.4–10.4)
POTASSIUM SERPL-SCNC: 4.6 MMOL/L (ref 3.5–5.1)
PROT SERPL-MCNC: 6.9 GM/DL (ref 6.4–8.3)
PROT UR QL STRIP.AUTO: NEGATIVE MG/DL
RBC # BLD AUTO: 4.3 X10(6)/MCL (ref 4.2–5.4)
RBC #/AREA URNS AUTO: ABNORMAL /HPF
RBC UR QL AUTO: NEGATIVE UNIT/L
SODIUM SERPL-SCNC: 139 MMOL/L (ref 136–145)
SP GR UR STRIP.AUTO: 1.02 (ref 1–1.03)
SQUAMOUS #/AREA URNS AUTO: 6 /HPF
UROBILINOGEN UR STRIP-ACNC: 1 MG/DL
WBC # SPEC AUTO: 5.7 X10(3)/MCL (ref 4.5–11.5)
WBC #/AREA URNS AUTO: <5 /HPF

## 2023-05-02 PROCEDURE — 80053 COMPREHEN METABOLIC PANEL: CPT | Performed by: PHYSICIAN ASSISTANT

## 2023-05-02 PROCEDURE — 81001 URINALYSIS AUTO W/SCOPE: CPT | Performed by: PHYSICIAN ASSISTANT

## 2023-05-02 PROCEDURE — 99283 EMERGENCY DEPT VISIT LOW MDM: CPT

## 2023-05-02 PROCEDURE — 85025 COMPLETE CBC W/AUTO DIFF WBC: CPT | Performed by: PHYSICIAN ASSISTANT

## 2023-05-02 PROCEDURE — 83690 ASSAY OF LIPASE: CPT | Performed by: PHYSICIAN ASSISTANT

## 2023-05-02 NOTE — FIRST PROVIDER EVALUATION
"Medical screening examination initiated.  I have conducted a focused provider triage encounter, findings are as follows:    Chief Complaint   Patient presents with    Flank Pain     Pt reporting bilateral flank pain x2 days, pt reporting "stinging" when urinating, denied blood. PMH of kidney stones and needing sx     Brief history of present illness:  52 y.o. female presents to the ED with bilateral flank pain onset 2 days ago. Hx of kidney stones     Vitals:    05/02/23 1505   BP: 136/80   Pulse: (!) 59   Resp: 18   Temp: 97.9 °F (36.6 °C)   TempSrc: Oral   SpO2: 97%   Weight: 93 kg (205 lb)   Height: 5' 6" (1.676 m)       Pertinent physical exam:  Awake, alert, ambulatory, non-labored respirations    Brief workup plan:  labs, UA    Preliminary workup initiated; this workup will be continued and followed by the physician or advanced practice provider that is assigned to the patient when roomed.  "

## 2023-05-22 ENCOUNTER — HOSPITAL ENCOUNTER (OUTPATIENT)
Dept: RADIOLOGY | Facility: HOSPITAL | Age: 53
Discharge: HOME | End: 2023-05-22
Attending: NURSE PRACTITIONER
Payer: COMMERCIAL

## 2023-05-22 ENCOUNTER — OFFICE VISIT (OUTPATIENT)
Dept: URGENT CARE | Facility: CLINIC | Age: 53
End: 2023-05-22
Payer: COMMERCIAL

## 2023-05-22 VITALS
RESPIRATION RATE: 20 BRPM | HEIGHT: 66 IN | WEIGHT: 216 LBS | DIASTOLIC BLOOD PRESSURE: 78 MMHG | TEMPERATURE: 98 F | OXYGEN SATURATION: 96 % | BODY MASS INDEX: 34.72 KG/M2 | HEART RATE: 63 BPM | SYSTOLIC BLOOD PRESSURE: 160 MMHG

## 2023-05-22 DIAGNOSIS — J06.9 UPPER RESPIRATORY TRACT INFECTION, UNSPECIFIED TYPE: Primary | ICD-10-CM

## 2023-05-22 DIAGNOSIS — R05.2 SUBACUTE COUGH: ICD-10-CM

## 2023-05-22 DIAGNOSIS — J06.9 UPPER RESPIRATORY TRACT INFECTION, UNSPECIFIED TYPE: ICD-10-CM

## 2023-05-22 LAB
CTP QC/QA: YES
CTP QC/QA: YES
FLUAV AG NPH QL: NEGATIVE
FLUBV AG NPH QL: NEGATIVE
SARS-COV-2 RDRP RESP QL NAA+PROBE: NEGATIVE

## 2023-05-22 PROCEDURE — 87635 SARS-COV-2 COVID-19 AMP PRB: CPT | Mod: PBBFAC | Performed by: NURSE PRACTITIONER

## 2023-05-22 PROCEDURE — 87804 INFLUENZA ASSAY W/OPTIC: CPT | Mod: PBBFAC | Performed by: NURSE PRACTITIONER

## 2023-05-22 PROCEDURE — 71046 X-RAY EXAM CHEST 2 VIEWS: CPT | Mod: TC

## 2023-05-22 PROCEDURE — 99215 OFFICE O/P EST HI 40 MIN: CPT | Mod: PBBFAC,25 | Performed by: NURSE PRACTITIONER

## 2023-05-22 PROCEDURE — 99213 OFFICE O/P EST LOW 20 MIN: CPT | Mod: S$PBB,,, | Performed by: NURSE PRACTITIONER

## 2023-05-22 PROCEDURE — 99213 PR OFFICE/OUTPT VISIT, EST, LEVL III, 20-29 MIN: ICD-10-PCS | Mod: S$PBB,,, | Performed by: NURSE PRACTITIONER

## 2023-05-22 RX ORDER — ALBUTEROL SULFATE 90 UG/1
2 AEROSOL, METERED RESPIRATORY (INHALATION) EVERY 6 HOURS PRN
Qty: 1 G | Refills: 2 | Status: SHIPPED | OUTPATIENT
Start: 2023-05-22

## 2023-05-22 RX ORDER — PROMETHAZINE HYDROCHLORIDE AND DEXTROMETHORPHAN HYDROBROMIDE 6.25; 15 MG/5ML; MG/5ML
5 SYRUP ORAL
Qty: 120 ML | Refills: 0 | Status: SHIPPED | OUTPATIENT
Start: 2023-05-22

## 2023-05-23 ENCOUNTER — TELEPHONE (OUTPATIENT)
Dept: FAMILY MEDICINE | Facility: CLINIC | Age: 53
End: 2023-05-23
Payer: COMMERCIAL

## 2023-05-23 ENCOUNTER — HOSPITAL ENCOUNTER (EMERGENCY)
Facility: HOSPITAL | Age: 53
Discharge: HOME | End: 2023-05-23
Attending: EMERGENCY MEDICINE
Payer: COMMERCIAL

## 2023-05-23 ENCOUNTER — NURSE TRIAGE (OUTPATIENT)
Dept: ADMINISTRATIVE | Facility: CLINIC | Age: 53
End: 2023-05-23
Payer: COMMERCIAL

## 2023-05-23 VITALS
BODY MASS INDEX: 33.89 KG/M2 | WEIGHT: 210 LBS | TEMPERATURE: 98 F | SYSTOLIC BLOOD PRESSURE: 136 MMHG | RESPIRATION RATE: 18 BRPM | DIASTOLIC BLOOD PRESSURE: 93 MMHG | HEART RATE: 84 BPM | OXYGEN SATURATION: 97 %

## 2023-05-23 DIAGNOSIS — R06.02 SOB (SHORTNESS OF BREATH): ICD-10-CM

## 2023-05-23 DIAGNOSIS — B96.89 ACUTE BACTERIAL BRONCHITIS: Primary | ICD-10-CM

## 2023-05-23 DIAGNOSIS — J20.8 ACUTE BACTERIAL BRONCHITIS: Primary | ICD-10-CM

## 2023-05-23 LAB
ALBUMIN SERPL-MCNC: 3.7 G/DL (ref 3.5–5)
ALBUMIN/GLOB SERPL: 1 RATIO (ref 1.1–2)
ALP SERPL-CCNC: 145 UNIT/L (ref 40–150)
ALT SERPL-CCNC: 12 UNIT/L (ref 0–55)
ANISOCYTOSIS BLD QL SMEAR: ABNORMAL
AST SERPL-CCNC: 22 UNIT/L (ref 5–34)
BASOPHILS # BLD AUTO: 0.03 X10(3)/MCL
BASOPHILS NFR BLD AUTO: 0.5 %
BILIRUBIN DIRECT+TOT PNL SERPL-MCNC: 0.2 MG/DL
BNP BLD-MCNC: 81.4 PG/ML
BUN SERPL-MCNC: 9.2 MG/DL (ref 9.8–20.1)
CALCIUM SERPL-MCNC: 9.6 MG/DL (ref 8.4–10.2)
CHLORIDE SERPL-SCNC: 112 MMOL/L (ref 98–107)
CO2 SERPL-SCNC: 21 MMOL/L (ref 22–29)
CREAT SERPL-MCNC: 0.79 MG/DL (ref 0.55–1.02)
ELLIPTOCYTOSIS (OHS): SLIGHT
EOSINOPHIL # BLD AUTO: 0.11 X10(3)/MCL (ref 0–0.9)
EOSINOPHIL NFR BLD AUTO: 1.7 %
ERYTHROCYTE [DISTWIDTH] IN BLOOD BY AUTOMATED COUNT: 18.7 % (ref 11.5–17)
GFR SERPLBLD CREATININE-BSD FMLA CKD-EPI: >60 MLS/MIN/1.73/M2
GLOBULIN SER-MCNC: 3.6 GM/DL (ref 2.4–3.5)
GLUCOSE SERPL-MCNC: 72 MG/DL (ref 74–100)
HCT VFR BLD AUTO: 34.3 % (ref 37–47)
HGB BLD-MCNC: 10 G/DL (ref 12–16)
HYPOCHROMIA BLD QL SMEAR: ABNORMAL
IMM GRANULOCYTES # BLD AUTO: 0.01 X10(3)/MCL (ref 0–0.04)
IMM GRANULOCYTES NFR BLD AUTO: 0.2 %
INR BLD: 1.77 (ref 2–3)
LYMPHOCYTES # BLD AUTO: 2.91 X10(3)/MCL (ref 0.6–4.6)
LYMPHOCYTES NFR BLD AUTO: 45.1 %
MCH RBC QN AUTO: 22.2 PG (ref 27–31)
MCHC RBC AUTO-ENTMCNC: 29.2 G/DL (ref 33–36)
MCV RBC AUTO: 76.1 FL (ref 80–94)
MICROCYTES BLD QL SMEAR: ABNORMAL
MONOCYTES # BLD AUTO: 0.53 X10(3)/MCL (ref 0.1–1.3)
MONOCYTES NFR BLD AUTO: 8.2 %
NEUTROPHILS # BLD AUTO: 2.86 X10(3)/MCL (ref 2.1–9.2)
NEUTROPHILS NFR BLD AUTO: 44.3 %
NRBC BLD AUTO-RTO: 0 %
PLATELET # BLD AUTO: 262 X10(3)/MCL (ref 130–400)
PLATELET # BLD EST: ADEQUATE 10*3/UL
PMV BLD AUTO: 9.4 FL (ref 7.4–10.4)
POTASSIUM SERPL-SCNC: 3.7 MMOL/L (ref 3.5–5.1)
PROT SERPL-MCNC: 7.3 GM/DL (ref 6.4–8.3)
PROTHROMBIN TIME: 20.7 SECONDS (ref 11.7–14.5)
RBC # BLD AUTO: 4.51 X10(6)/MCL (ref 4.2–5.4)
RBC MORPH BLD: ABNORMAL
SODIUM SERPL-SCNC: 143 MMOL/L (ref 136–145)
TROPONIN I SERPL-MCNC: 0.01 NG/ML (ref 0–0.04)
WBC # SPEC AUTO: 6.45 X10(3)/MCL (ref 4.5–11.5)

## 2023-05-23 PROCEDURE — 99284 EMERGENCY DEPT VISIT MOD MDM: CPT | Mod: 25

## 2023-05-23 PROCEDURE — 84484 ASSAY OF TROPONIN QUANT: CPT | Performed by: EMERGENCY MEDICINE

## 2023-05-23 PROCEDURE — 94640 AIRWAY INHALATION TREATMENT: CPT | Mod: XB

## 2023-05-23 PROCEDURE — 99900031 HC PATIENT EDUCATION (STAT)

## 2023-05-23 PROCEDURE — 85610 PROTHROMBIN TIME: CPT | Performed by: EMERGENCY MEDICINE

## 2023-05-23 PROCEDURE — 94761 N-INVAS EAR/PLS OXIMETRY MLT: CPT

## 2023-05-23 PROCEDURE — 96374 THER/PROPH/DIAG INJ IV PUSH: CPT

## 2023-05-23 PROCEDURE — 83880 ASSAY OF NATRIURETIC PEPTIDE: CPT | Performed by: EMERGENCY MEDICINE

## 2023-05-23 PROCEDURE — 80053 COMPREHEN METABOLIC PANEL: CPT | Performed by: EMERGENCY MEDICINE

## 2023-05-23 PROCEDURE — 25000242 PHARM REV CODE 250 ALT 637 W/ HCPCS: Performed by: EMERGENCY MEDICINE

## 2023-05-23 PROCEDURE — 85025 COMPLETE CBC W/AUTO DIFF WBC: CPT | Performed by: EMERGENCY MEDICINE

## 2023-05-23 PROCEDURE — 93005 ELECTROCARDIOGRAM TRACING: CPT

## 2023-05-23 PROCEDURE — 63600175 PHARM REV CODE 636 W HCPCS: Performed by: EMERGENCY MEDICINE

## 2023-05-23 PROCEDURE — 94640 AIRWAY INHALATION TREATMENT: CPT

## 2023-05-23 RX ORDER — IPRATROPIUM BROMIDE AND ALBUTEROL SULFATE 2.5; .5 MG/3ML; MG/3ML
3 SOLUTION RESPIRATORY (INHALATION)
Status: COMPLETED | OUTPATIENT
Start: 2023-05-23 | End: 2023-05-23

## 2023-05-23 RX ORDER — DEXAMETHASONE SODIUM PHOSPHATE 4 MG/ML
8 INJECTION, SOLUTION INTRA-ARTICULAR; INTRALESIONAL; INTRAMUSCULAR; INTRAVENOUS; SOFT TISSUE
Status: COMPLETED | OUTPATIENT
Start: 2023-05-23 | End: 2023-05-23

## 2023-05-23 RX ORDER — IPRATROPIUM BROMIDE AND ALBUTEROL SULFATE 2.5; .5 MG/3ML; MG/3ML
3 SOLUTION RESPIRATORY (INHALATION) EVERY 6 HOURS PRN
Qty: 75 ML | Refills: 0 | Status: SHIPPED | OUTPATIENT
Start: 2023-05-23 | End: 2024-05-22

## 2023-05-23 RX ORDER — IPRATROPIUM BROMIDE AND ALBUTEROL SULFATE 2.5; .5 MG/3ML; MG/3ML
3 SOLUTION RESPIRATORY (INHALATION) ONCE
Status: COMPLETED | OUTPATIENT
Start: 2023-05-23 | End: 2023-05-23

## 2023-05-23 RX ORDER — AZITHROMYCIN 250 MG/1
TABLET, FILM COATED ORAL
Qty: 6 TABLET | Refills: 0 | Status: SHIPPED | OUTPATIENT
Start: 2023-05-23 | End: 2023-05-28

## 2023-05-23 RX ORDER — IPRATROPIUM BROMIDE AND ALBUTEROL SULFATE 2.5; .5 MG/3ML; MG/3ML
SOLUTION RESPIRATORY (INHALATION)
Status: DISCONTINUED
Start: 2023-05-23 | End: 2023-05-23 | Stop reason: HOSPADM

## 2023-05-23 RX ORDER — NEBULIZER AND COMPRESSOR
1 EACH MISCELLANEOUS EVERY 6 HOURS PRN
Qty: 1 EACH | Refills: 0 | Status: SHIPPED | OUTPATIENT
Start: 2023-05-23

## 2023-05-23 RX ORDER — DEXAMETHASONE 4 MG/1
4 TABLET ORAL EVERY 12 HOURS
Qty: 10 TABLET | Refills: 0 | Status: SHIPPED | OUTPATIENT
Start: 2023-05-23 | End: 2023-05-28

## 2023-05-23 RX ADMIN — IPRATROPIUM BROMIDE AND ALBUTEROL SULFATE 3 ML: .5; 3 SOLUTION RESPIRATORY (INHALATION) at 06:05

## 2023-05-23 RX ADMIN — IPRATROPIUM BROMIDE AND ALBUTEROL SULFATE 3 ML: .5; 3 SOLUTION RESPIRATORY (INHALATION) at 08:05

## 2023-05-23 RX ADMIN — DEXAMETHASONE SODIUM PHOSPHATE 8 MG: 4 INJECTION, SOLUTION INTRA-ARTICULAR; INTRALESIONAL; INTRAMUSCULAR; INTRAVENOUS; SOFT TISSUE at 06:05

## 2023-05-23 NOTE — TELEPHONE ENCOUNTER
Ucc note from yesterday reviewed. Bp was elevated. Cxr still showed abnormality.  Since she is still febrile and not feeling well, I do recommend going to ER for eval.

## 2023-05-23 NOTE — TELEPHONE ENCOUNTER
----- Message from April Stewart sent at 5/23/2023 12:43 PM CDT -----  Regarding: med advice  .Type:  Needs Medical Advice    Who Called: patient  Symptoms (please be specific): shortness of breath   How long has patient had these symptoms:   Friday  Pharmacy name and phone #:    Would the patient rather a call back or a response via [facility]? Call back  Best Call Back Number: [phone]  Additional Information:  patient is having the listed symptoms above and would like to be contacted back. She has went to an urgent care. I am transferring her over to an on-call nurse for assistance.

## 2023-05-23 NOTE — PROGRESS NOTES
"Subjective:      Patient ID: Holly Starr is a 52 y.o. female.    Vitals:  height is 5' 6" (1.676 m) and weight is 98 kg (216 lb). Her oral temperature is 98.2 °F (36.8 °C). Her blood pressure is 160/78 (abnormal) and her pulse is 63. Her respiration is 20 and oxygen saturation is 96%.     Chief Complaint: Fever (Fever, SOB, cough, body aches since Friday)    HPI presents with c/o cough, fever, shortness of breath with cough and body aches for 4 days. Pt states she took a home Covid test 3 days ago in which her test was negative. Pt denies any chest pain, headache or dizziness. Has been taking Tylenol and Motrin with some relief.     Constitution: Positive for fever.   Respiratory:  Positive for cough, sputum production and shortness of breath.         Productive cough of clear/yellow sputum in the morning.    Objective:     Physical Exam   Constitutional: She is oriented to person, place, and time. She appears ill. No distress. obesity  HENT:   Nose: Nose normal.   Mouth/Throat: Mucous membranes are moist.   Eyes: Conjunctivae are normal.   Cardiovascular: Normal rate.   Pulmonary/Chest: Effort normal and breath sounds normal. No stridor. No respiratory distress. She has no wheezes. She has no rhonchi. She has no rales. She exhibits no tenderness.   Abdominal: Normal appearance.   Lymphadenopathy:     She has no cervical adenopathy.   Neurological: She is alert and oriented to person, place, and time.   Skin: Skin is warm, dry and not diaphoretic.   Psychiatric: Her behavior is normal. Mood, judgment and thought content normal.   Nursing note and vitals reviewed.    Assessment:     1. Upper respiratory tract infection, unspecified type    2. Subacute cough      XR CHEST PA AND LATERAL    Result Date: 5/23/2023  EXAMINATION: XR CHEST PA AND LATERAL CLINICAL HISTORY: Acute upper respiratory infection, unspecified TECHNIQUE: Two views of the chest COMPARISON: 12/04/2022 FINDINGS: Improved right hilar opacification. " Postsurgical changes of median sternotomy are unchanged. No acute osseous abnormality.     Improved right hilar opacification. Electronically signed by: Emmanuel Santos Date:    05/23/2023 Time:    10:38     Plan:   Please follow instructions on patient education material.  Return to urgent care in 2 to 3 days if symptoms are not improving, immediately if you develop any new or worsening symptoms.   - OTC cold/flu products as desired for symptoms  - Plenty of fluids  - Home from work/school  - Tylenol or Motrin for pain/fever  - Flu/COVID tests are negative.     Your chest Xray has shown improvement since last study.  However, If you develop worsening shortness of breath or chest pain, or change in status, go to ER for an evaluation.     Upper respiratory tract infection, unspecified type  -     POCT COVID-19 Rapid Screening  -     POCT Influenza A/B  -     XR CHEST PA AND LATERAL; Future; Expected date: 05/22/2023    Subacute cough  -     XR CHEST PA AND LATERAL; Future; Expected date: 05/22/2023  -     promethazine-dextromethorphan (PROMETHAZINE-DM) 6.25-15 mg/5 mL Syrp; Take 5 mLs by mouth every 6 to 8 hours as needed (cough). May cause sedation.  Do not drive or operate heavy machinery after taking this medication.  Dispense: 120 mL; Refill: 0  -     albuterol (PROVENTIL HFA) 90 mcg/actuation inhaler; Inhale 2 puffs into the lungs every 6 (six) hours as needed for Wheezing. Rescue  Dispense: 1 g; Refill: 2

## 2023-05-23 NOTE — TELEPHONE ENCOUNTER
Pt c/o wheezing, difficulty breathing and fever 102. States that she was seen in UC on yesterday and symptoms are now worst.  Pt states that she has wheezing fits and struggling to breath and talk. Advised to call 911 per protocol. States that someone will bring her to ED. Encounter routed to provider.       Reason for Disposition   SEVERE difficulty breathing (e.g., struggling for each breath, speaks in single words, pulse > 120)    Protocols used: Breathing Difficulty-A-[place]

## 2023-05-23 NOTE — TELEPHONE ENCOUNTER
I sent you a message from the on call nurse who spoke with the patient earlier and recommended that she go to the ER.     I called the patient myself as well. She told me that she went to [facility] yesterday. Her symptoms that she is still having are: SOB, fever of 102, and she said her chest muscles feel sore from coughing. The notes are in her chart for review. She has not picked up the rx promethazine and inhaler that were sent in for her, but she told me she was leaving work now to go get them and go home to rest. She asked if you could review her [facility] note and CXR results and if you still recommend that she go to ER she will. Please advise.

## 2023-05-23 NOTE — ED PROVIDER NOTES
Encounter Date: 5/23/2023       History     Chief Complaint   Patient presents with    Shortness of Breath     Pt c/o sob w/ cough and fever x 4 days.      The history is provided by the patient. No  was used.   Shortness of Breath  This is a new problem. The average episode lasts 4 days. The problem occurs continuously.The current episode started more than 2 days ago. The problem has been gradually worsening. Associated symptoms include a fever, rhinorrhea, cough, sputum production and wheezing. Pertinent negatives include no sore throat, no chest pain, no rash and no leg swelling. She has had No prior hospitalizations. She has had Prior ED visits. She has had No prior ICU admissions.   Pt has h/o valvular heart disease and has a mechanical valve, anticoagulated with warfarin.  Seen at  yesterday and had negative COVID/flu swabs and CXR that was reportedly negative.    Review of patient's allergies indicates:   Allergen Reactions    Prednisone Other (See Comments)     Other reaction(s): heart racing  heart race  heart race    jittery  Other reaction(s): heart racing  heart race  heart race    Mirtazapine Anxiety and Other (See Comments)     Causes nerves to be on fire like fibromyalgia pain.  Other reaction(s): nerves on fire    Niacin Rash     Other reaction(s): Tachycardia, tacycardia     Past Medical History:   Diagnosis Date    Anemia     Anticoagulated on Coumadin     Anxiety     DVT, bilateral lower limbs     Hyperlipidemia     Insomnia     Kidney stone     PAD (peripheral artery disease)     Psoriatic arthritis     Pulmonary HTN     Urge incontinence      Past Surgical History:   Procedure Laterality Date    CHOLECYSTECTOMY      COLONOSCOPY  05/30/2019    cytoscopy  11/15/2019    fluoroscopy of Left heart  08/01/2018    FOOT FRACTURE SURGERY  2015    FUNCTIONAL ENDOSCOPIC SINUS SURGERY (FESS)      GASTRIC BYPASS  10/01/2010    HYSTERECTOMY      LEFT HEART CATHETERIZATION  08/01/2018     mammogram  04/17/2019    TONSILLECTOMY      VAGINAL HYSTERECTOMY W/ ANTERIOR AND POSTERIOR VAGINAL REPAIR  10/21/2020     History reviewed. No pertinent family history.  Social History     Tobacco Use    Smoking status: Never    Smokeless tobacco: Never   Substance Use Topics    Alcohol use: Yes     Comment: occassionally    Drug use: Never     Review of Systems   Constitutional:  Positive for fever.   HENT:  Positive for rhinorrhea. Negative for sore throat.    Respiratory:  Positive for cough, sputum production, shortness of breath and wheezing.    Cardiovascular:  Negative for chest pain and leg swelling.   Gastrointestinal:  Negative for nausea.   Genitourinary:  Negative for dysuria.   Musculoskeletal:  Negative for back pain.   Skin:  Negative for rash.   Neurological:  Negative for weakness.   Hematological:  Does not bruise/bleed easily.     Physical Exam     Initial Vitals   BP Pulse Resp Temp SpO2   05/23/23 1624 05/23/23 1624 05/23/23 1621 05/23/23 1624 05/23/23 1624   (!) 145/98 85 20 97.8 °F (36.6 °C) 98 %      MAP       --                Physical Exam    Nursing note and vitals reviewed.  Constitutional: She appears well-developed and well-nourished.   HENT:   Head: Normocephalic and atraumatic.   Right Ear: External ear normal.   Left Ear: External ear normal.   Eyes: Conjunctivae and EOM are normal. Pupils are equal, round, and reactive to light.   Neck: Neck supple.   Normal range of motion.  Cardiovascular:  Normal rate, regular rhythm, normal heart sounds and intact distal pulses.           Bastrop heart valve   Pulmonary/Chest: She has wheezes in the right middle field, the right lower field, the left middle field and the left lower field. She has rhonchi in the right upper field, the right middle field, the right lower field, the left upper field, the left middle field and the left lower field.   Abdominal: Abdomen is soft. Bowel sounds are normal.   Musculoskeletal:         General: Normal range  of motion.      Cervical back: Normal range of motion and neck supple.     Neurological: She is alert and oriented to person, place, and time. GCS score is 15. GCS eye subscore is 4. GCS verbal subscore is 5. GCS motor subscore is 6.   Skin: Skin is warm and dry. Capillary refill takes less than 2 seconds.   Psychiatric: She has a normal mood and affect. Her behavior is normal. Judgment and thought content normal.       ED Course   Procedures  Labs Reviewed   COMPREHENSIVE METABOLIC PANEL - Abnormal; Notable for the following components:       Result Value    Chloride 112 (*)     Carbon Dioxide 21 (*)     Glucose Level 72 (*)     Blood Urea Nitrogen 9.2 (*)     Globulin 3.6 (*)     Albumin/Globulin Ratio 1.0 (*)     All other components within normal limits   PROTIME-INR - Abnormal; Notable for the following components:    PT 20.7 (*)     INR 1.77 (*)     All other components within normal limits   CBC WITH DIFFERENTIAL - Abnormal; Notable for the following components:    Hgb 10.0 (*)     Hct 34.3 (*)     MCV 76.1 (*)     MCH 22.2 (*)     MCHC 29.2 (*)     RDW 18.7 (*)     All other components within normal limits   BLOOD SMEAR MICROSCOPIC EXAM (OLG) - Abnormal; Notable for the following components:    RBC Morph Abnormal (*)     Anisocyte 2+ (*)     Elliptocytosis Slight (*)     Hypochrom 2+ (*)     Microcyte 1+ (*)     All other components within normal limits   B-TYPE NATRIURETIC PEPTIDE - Normal   TROPONIN I - Normal   CBC W/ AUTO DIFFERENTIAL    Narrative:     The following orders were created for panel order CBC auto differential.  Procedure                               Abnormality         Status                     ---------                               -----------         ------                     CBC with Differential[[id number]]        Abnormal            Final result                 Please view results for these tests on the individual orders.     EKG Readings: (Independently Interpreted)   Initial  "Reading: No STEMI. Rhythm: Normal Sinus Rhythm. Heart Rate: 81. Conduction: 1st Degree AV Block. ST Segments: Normal ST Segments. T Waves Flipped: II, III, AVF, V5 and V6. Axis: Right Axis Deviation. Clinical Impression: Normal Sinus Rhythm and AV Block - 1st Degree   ECG Results              EKG 12-lead (Final result)  Result time 05/24/23 09:32:53      Final result by Interface, Lab In [facility] (05/24/23 09:32:53)                   Narrative:    Test Reason : R06.02,    Vent. Rate : 081 BPM     Atrial Rate : 081 BPM     P-R Int : 244 ms          QRS Dur : 132 ms      QT Int : 398 ms       P-R-T Axes : 069 109 -27 degrees     QTc Int : 462 ms    Sinus rhythm with 1st degree A-V block  Rightward axis  Nonspecific intraventricular block  T wave abnormality, consider inferolateral ischemia  Abnormal ECG  When compared with ECG of 30-NOV-2022 22:51,  Premature ventricular complexes are no longer Present  The axis Shifted right  T wave inversion less evident in Lateral leads  Confirmed by Emmanuel Fierro MD ([id number]) on 5/24/2023 9:32:46 AM    Referred By: [email or web address]   SELF           Confirmed By:Emmanuel Fierro MD                                  Imaging Results    None          Medications   albuterol-ipratropium 2.5 mg-0.5 mg/3 mL nebulizer solution 3 mL (3 mLs Nebulization Given 5/23/23 1842)   dexAMETHasone injection 8 mg (8 mg Intravenous Given 5/23/23 1817)   albuterol-ipratropium 2.5 mg-0.5 mg/3 mL nebulizer solution 3 mL (3 mLs Nebulization Given 5/23/23 2030)      Differential includes:  bronchitis, CAP, CHF, pleural effusion, undiagnosed COPD.  Will review CXR from yesterday and obtain CBC, CMP, BNP, troponin and administer DuoNeb and steroids.  She reports similar symptoms at the end of last year and it developed into "double pneumonia".  Given co-morbidities, particularly the presence of a mechanical heart valve, would have a low threshold to cover with antibiotics, especially given prior h/o bilateral " pneumonia.    Care turned over to Dr. Wilson at 1900.           ED Course as of 05/25/23 0701 Tue May 23, 2023   1954 MD to re-evaluate [GM]   2025 Patient feels better. Still has some wheezign on auscutation. Plan to d/c with steroids, nebs, nubilzer script, and antibiotics for pneumonia [GM]      ED Course User Index  [GM] Irma Wilson MD                 Clinical Impression:   Final diagnoses:  [J20.8, B96.89] Acute bacterial bronchitis (Primary)        ED Disposition Condition    Discharge Stable          ED Prescriptions       Medication Sig Dispense Start Date End Date Auth. Provider    azithromycin (Z-FERMIN) 250 MG tablet Take 2 tablets by mouth on day 1; Take 1 tablet by mouth on days 2-5 6 tablet 5/23/2023 5/28/2023 Irma Wilson MD    dexAMETHasone (DECADRON) 4 MG Tab Take 1 tablet (4 mg total) by mouth every 12 (twelve) hours. for 5 days 10 tablet 5/23/2023 5/28/2023 Irma Wilson MD    albuterol-ipratropium (DUO-NEB) 2.5 mg-0.5 mg/3 mL nebulizer solution Take 3 mLs by nebulization every 6 (six) hours as needed for Wheezing. Rescue 75 mL 5/23/2023 5/22/2024 Irma Wilson MD    nebulizer and compressor Asya 1 Units by Misc.(Non-Drug; Combo Route) route every 6 (six) hours as needed (wheezing). 1 each 5/23/2023 -- Irma Wilson MD          Follow-up Information       Follow up With Specialties Details Why Contact Info    Priti Mesa MD Family Medicine In 2 days If symptoms worsen, return to the ED [address]  [address]  [facility] [place] [facility] [address]  [phone]               Aaron Glez MD  05/25/23 0701

## 2023-05-23 NOTE — TELEPHONE ENCOUNTER
Patient notified and verbalized understanding. She said she would head to [facility] ER now

## 2023-10-03 NOTE — PROGRESS NOTES
"Subjective:       Patient ID: Holly Starr is a 51 y.o. female.    Vitals:  height is 5' 5" (1.651 m) and weight is 93.9 kg (207 lb 0.2 oz). Her temperature is 97.8 °F (36.6 °C). Her blood pressure is 116/76 and her pulse is 87. Her oxygen saturation is 99%.     Chief Complaint: Injury (Black toenail that may need drained - Entered by patient.  Wednesday dropped a bookcase on toe and now toe is black and blood building up underneath nail.  Patient is on blood thinners)    52 yo F dropped object on toe now with ecchymosis to the toe, in particular nail bed.  No numbness.       Constitution: Negative for fever.   Respiratory: Negative for shortness of breath.    Musculoskeletal: Positive for pain and trauma.   Neurological: Negative for dizziness.       Objective:      Physical Exam   Musculoskeletal:      Comments: Pos TTP throughout the R 1st toe   Skin:         Comments: Pos ecchymosis to base of R 1st nail, no fluctuance   Psychiatric: Mood normal.         Assessment:       1. Great toe pain, right          Plan:         Great toe pain, right  -     X-Ray Toe 2 or More Views Right; Future; Expected date: 06/19/2022            X ray per my review no acute fractures.   Will also call with final report.        "
Condition:: Acne
Please Describe Your Condition:: Acne worse

## 2024-01-29 PROBLEM — Z00.00 WELLNESS EXAMINATION: Status: RESOLVED | Noted: 2022-10-19 | Resolved: 2024-01-29

## 2024-03-14 ENCOUNTER — PATIENT OUTREACH (OUTPATIENT)
Dept: ADMINISTRATIVE | Facility: HOSPITAL | Age: 54
End: 2024-03-14
Payer: COMMERCIAL

## 2024-03-14 NOTE — PROGRESS NOTES
Population Health Chart Review & Patient Outreach Details      Additional [facility] Health Notes:    Records request sent to [facility] for mmg.           Updates Requested / Reviewed:      Care Everywhere         Health Maintenance Topics Overdue:      VBHM Score: 2     Mammogram  Hemoglobin A1c                   Health Maintenance Topic(s) Outreach Outcomes & Actions Taken:    Breast Cancer Screening - Outreach Outcomes & Actions Taken  : External Records Requested & Care Team Updated if Applicable

## 2024-03-14 NOTE — LETTER
AUTHORIZATION FOR RELEASE OF   CONFIDENTIAL INFORMATION    Dear JACK, Fax [phone]    We are seeing Holly Starr, date of birth 1970, in the clinic at [facility]. Priti Mesa MD is the patient's PCP. Holly Starr has an outstanding lab/procedure at the time we reviewed her chart. In order to help keep her health information updated, she has authorized us to request the following medical record(s):        ( X )  MAMMOGRAM                                      (  )  COLONOSCOPY      (  )  PAP SMEAR                                          (  )  OUTSIDE LAB RESULTS     (  )  DEXA SCAN                                          (  )  EYE EXAM            (  )  FOOT EXAM                                          (  )  ENTIRE RECORD     (  )  OUTSIDE IMMUNIZATIONS                 (  )  _______________         Please fax records to [facility], Taylor, Michelle M, MD, [phone] or [phone].       If you have any questions you can contact Mary Beth Hastings at [phone].           Patient Name: Holly Starr  : 1970  Patient Phone #: [phone]